# Patient Record
Sex: MALE | Race: WHITE | NOT HISPANIC OR LATINO | Employment: FULL TIME | URBAN - METROPOLITAN AREA
[De-identification: names, ages, dates, MRNs, and addresses within clinical notes are randomized per-mention and may not be internally consistent; named-entity substitution may affect disease eponyms.]

---

## 2018-05-02 ENCOUNTER — HOSPITAL ENCOUNTER (EMERGENCY)
Facility: OTHER | Age: 43
Discharge: HOME | End: 2018-05-02
Attending: EMERGENCY MEDICINE
Payer: MEDICAID

## 2018-05-02 VITALS
SYSTOLIC BLOOD PRESSURE: 125 MMHG | HEIGHT: 72 IN | DIASTOLIC BLOOD PRESSURE: 59 MMHG | TEMPERATURE: 98 F | HEART RATE: 84 BPM | OXYGEN SATURATION: 95 % | RESPIRATION RATE: 13 BRPM

## 2018-05-02 DIAGNOSIS — R41.82 ALTERED MENTAL STATUS: ICD-10-CM

## 2018-05-02 DIAGNOSIS — F10.921 ACUTE ALCOHOLIC INTOXICATION WITH DELIRIUM: Primary | ICD-10-CM

## 2018-05-02 LAB
ALBUMIN SERPL BCP-MCNC: 4.2 G/DL
ALP SERPL-CCNC: 143 U/L
ALT SERPL W/O P-5'-P-CCNC: 123 U/L
ANION GAP SERPL CALC-SCNC: 21 MMOL/L
APAP SERPL-MCNC: <3 UG/ML
AST SERPL-CCNC: 95 U/L
BASOPHILS # BLD AUTO: 0.07 K/UL
BASOPHILS NFR BLD: 0.5 %
BILIRUB SERPL-MCNC: 0.3 MG/DL
BUN SERPL-MCNC: 8 MG/DL
CALCIUM SERPL-MCNC: 9.1 MG/DL
CHLORIDE SERPL-SCNC: 109 MMOL/L
CK SERPL-CCNC: 1028 U/L
CO2 SERPL-SCNC: 14 MMOL/L
CREAT SERPL-MCNC: 1 MG/DL
DIFFERENTIAL METHOD: ABNORMAL
EOSINOPHIL # BLD AUTO: 0.5 K/UL
EOSINOPHIL NFR BLD: 3.7 %
ERYTHROCYTE [DISTWIDTH] IN BLOOD BY AUTOMATED COUNT: 12.3 %
EST. GFR  (AFRICAN AMERICAN): >60 ML/MIN/1.73 M^2
EST. GFR  (NON AFRICAN AMERICAN): >60 ML/MIN/1.73 M^2
ETHANOL SERPL-MCNC: 276 MG/DL
GLUCOSE SERPL-MCNC: 84 MG/DL
HCT VFR BLD AUTO: 45.1 %
HGB BLD-MCNC: 15.3 G/DL
LYMPHOCYTES # BLD AUTO: 5.9 K/UL
LYMPHOCYTES NFR BLD: 43.3 %
MCH RBC QN AUTO: 30.2 PG
MCHC RBC AUTO-ENTMCNC: 33.9 G/DL
MCV RBC AUTO: 89 FL
MONOCYTES # BLD AUTO: 1 K/UL
MONOCYTES NFR BLD: 7.4 %
NEUTROPHILS # BLD AUTO: 6.1 K/UL
NEUTROPHILS NFR BLD: 44.6 %
PLATELET # BLD AUTO: 260 K/UL
PMV BLD AUTO: 10.1 FL
POCT GLUCOSE: 75 MG/DL (ref 70–110)
POTASSIUM SERPL-SCNC: 4.2 MMOL/L
PROT SERPL-MCNC: 8.4 G/DL
RBC # BLD AUTO: 5.07 M/UL
SODIUM SERPL-SCNC: 144 MMOL/L
T4 FREE SERPL-MCNC: 0.98 NG/DL
TSH SERPL DL<=0.005 MIU/L-ACNC: 0.37 UIU/ML
WBC # BLD AUTO: 13.67 K/UL

## 2018-05-02 PROCEDURE — 25000003 PHARM REV CODE 250: Performed by: EMERGENCY MEDICINE

## 2018-05-02 PROCEDURE — 84443 ASSAY THYROID STIM HORMONE: CPT

## 2018-05-02 PROCEDURE — 80053 COMPREHEN METABOLIC PANEL: CPT

## 2018-05-02 PROCEDURE — 80329 ANALGESICS NON-OPIOID 1 OR 2: CPT

## 2018-05-02 PROCEDURE — 96360 HYDRATION IV INFUSION INIT: CPT

## 2018-05-02 PROCEDURE — 85025 COMPLETE CBC W/AUTO DIFF WBC: CPT

## 2018-05-02 PROCEDURE — 80320 DRUG SCREEN QUANTALCOHOLS: CPT

## 2018-05-02 PROCEDURE — 63600175 PHARM REV CODE 636 W HCPCS: Performed by: EMERGENCY MEDICINE

## 2018-05-02 PROCEDURE — 82550 ASSAY OF CK (CPK): CPT

## 2018-05-02 PROCEDURE — 96372 THER/PROPH/DIAG INJ SC/IM: CPT

## 2018-05-02 PROCEDURE — 99285 EMERGENCY DEPT VISIT HI MDM: CPT | Mod: 25

## 2018-05-02 PROCEDURE — 84439 ASSAY OF FREE THYROXINE: CPT

## 2018-05-02 PROCEDURE — 93010 ELECTROCARDIOGRAM REPORT: CPT | Mod: ,,, | Performed by: INTERNAL MEDICINE

## 2018-05-02 PROCEDURE — 96361 HYDRATE IV INFUSION ADD-ON: CPT

## 2018-05-02 RX ORDER — HALOPERIDOL 5 MG/ML
5 INJECTION INTRAMUSCULAR
Status: COMPLETED | OUTPATIENT
Start: 2018-05-02 | End: 2018-05-02

## 2018-05-02 RX ADMIN — LORAZEPAM 1 MG: 2 INJECTION INTRAMUSCULAR; INTRAVENOUS at 10:05

## 2018-05-02 RX ADMIN — SODIUM CHLORIDE 1000 ML: 0.9 INJECTION, SOLUTION INTRAVENOUS at 11:05

## 2018-05-02 RX ADMIN — HALOPERIDOL LACTATE 5 MG: 5 INJECTION, SOLUTION INTRAMUSCULAR at 10:05

## 2018-05-02 NOTE — ED NOTES
Pt awake and at standing at doorway of room. Speech clear. Pt ambulates with steady gait. MD aware, OK to discontinue UA and drug screen orders.
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
## 2018-05-02 NOTE — ED NOTES
Received report from NARESH Bennett at bedside.   Pt is sleeping in stretcher, free of restraints. Pt is connected to cardiac monitoring, NIBP cuff and pulse ox. Pt is in no acute distress. Respirations even and non labored.   Bed is locked and in lowest position, side rails up x 2, will continue to monitor.

## 2018-05-02 NOTE — ED PROVIDER NOTES
"Encounter Date: 5/2/2018       History     Chief Complaint   Patient presents with    Altered Mental Status     passerby called EMS. pt report ETOH, pt in coherant and reffering to security as "[place]" and [place]     Urgent evaluation of 42-year-old gentleman brought in by EMS for concern of intoxication.  Upon arrival to the emergency Department, patient became hyperactive, yelling about "[place]ihads" requiring manual and chemical sedation.          Review of patient's allergies indicates:  No Known Allergies  No past medical history on file.  No past surgical history on file.  No family history on file.  Social History   Substance Use Topics    Smoking status: Not on file    Smokeless tobacco: Not on file    Alcohol use Not on file     Review of Systems   Unable to perform ROS: Acuity of condition       Physical Exam     Initial Vitals [05/02/18 1018]   BP Pulse Resp Temp SpO2   104/66 87 16 96.7 °F (35.9 °C) 99 %      MAP       78.67         Physical Exam    Nursing note and vitals reviewed.  Constitutional: He appears well-developed. He appears listless. He is diaphoretic. He appears distressed.   Agitated, violent   HENT:   Head: Normocephalic and atraumatic.   Horizontal nystagmus   Eyes: Conjunctivae and EOM are normal. Pupils are equal, round, and reactive to light.   Neck: Normal range of motion. Neck supple.   Cardiovascular: Normal heart sounds. Tachycardia present.    Pulmonary/Chest: Effort normal and breath sounds normal.   Abdominal: Soft. Normal appearance and bowel sounds are normal. There is no tenderness.   Musculoskeletal: Normal range of motion.   Neurological: He has normal strength. He appears listless. No cranial nerve deficit or sensory deficit. GCS eye subscore is 4. GCS verbal subscore is 5. GCS motor subscore is 6.   Skin: Skin is warm.   Psychiatric: His mood appears anxious. His affect is angry, labile and inappropriate. His speech is rapid and/or pressured. He is " "agitated, aggressive and hyperactive. He expresses impulsivity.   disoriented He is inattentive.         ED Course   Procedures  Labs Reviewed   CBC W/ AUTO DIFFERENTIAL - Abnormal; Notable for the following:        Result Value    WBC 13.67 (*)     Lymph # 5.9 (*)     All other components within normal limits   COMPREHENSIVE METABOLIC PANEL - Abnormal; Notable for the following:     CO2 14 (*)     Alkaline Phosphatase 143 (*)     AST 95 (*)      (*)     Anion Gap 21 (*)     All other components within normal limits   TSH - Abnormal; Notable for the following:     TSH 0.373 (*)     All other components within normal limits   ALCOHOL,MEDICAL (ETHANOL) - Abnormal; Notable for the following:     Alcohol, Medical, Serum 276 (*)     All other components within normal limits   ACETAMINOPHEN LEVEL - Abnormal; Notable for the following:     Acetaminophen (Tylenol), Serum <3.0 (*)     All other components within normal limits   CK - Abnormal; Notable for the following:     CPK 1028 (*)     All other components within normal limits   T4, FREE   URINALYSIS   DRUG SCREEN PANEL, URINE EMERGENCY   POCT GLUCOSE   POCT GLUCOSE MONITORING CONTINUOUS     EKG Readings: (Independently Interpreted)   Initial Reading: No STEMI.   Normal sinus rhythm, heart rate 95, normal axis, no STEMI          Medical Decision Making:   Initial Assessment:   Urgent evaluation of 42-year-old gentleman with unknown medical history brought in by EMS given concern for intoxication.  On arrival to the emergency Department, patient became very agitated, and attempting to elope, requiring manual restraint. + Poor hygiene, malododorous and dishevled. I attempted to de-escalate the situation, but patient yelling about "the war in [place]", and behaving erratically, requiring chemical sedation.  Once calmed, patient endorses drinking, reports being homeless, endorses prior psychiatric hospitalizations, but currently not suicidal or homicidal, denies hearing " voices. Suspect subtance induced behavior w likely underlying psych issues. Will reassess w metabolization.       Clinical Tests:   Lab Tests: Ordered and Reviewed  Medical Tests: Ordered and Reviewed  ED Management:  Labs obtained, notable for etoh 276, low tsh- nml t4, cpk 1028 and mild elevated ast/alt   Pt treated with IVF, allowed to metabolize to clinical sobriety. Able to ambulate without assistance, a+0x3 prior to dc home with substance abuse education, follow up given.                       Clinical Impression:     1. Acute alcoholic intoxication with delirium    2. Altered mental status        Disposition:   Disposition: Discharged  Condition: Fair                        Edith Driscoll MD  05/02/18 0571

## 2018-05-03 ENCOUNTER — HOSPITAL ENCOUNTER (INPATIENT)
Facility: HOSPITAL | Age: 43
LOS: 5 days | Discharge: HOME | DRG: 897 | End: 2018-05-08
Attending: PSYCHIATRY & NEUROLOGY | Admitting: PSYCHIATRY & NEUROLOGY
Payer: MEDICAID

## 2018-05-03 DIAGNOSIS — F10.930 ALCOHOL WITHDRAWAL SYNDROME WITHOUT COMPLICATION: ICD-10-CM

## 2018-05-03 DIAGNOSIS — F19.94 SUBSTANCE INDUCED MOOD DISORDER: Chronic | ICD-10-CM

## 2018-05-03 DIAGNOSIS — F17.200 TOBACCO USE DISORDER: Chronic | ICD-10-CM

## 2018-05-03 DIAGNOSIS — F10.20 ALCOHOL USE DISORDER, SEVERE, DEPENDENCE: Chronic | ICD-10-CM

## 2018-05-03 DIAGNOSIS — F32.9 MAJOR DEPRESSION: ICD-10-CM

## 2018-05-03 DIAGNOSIS — R74.01 TRANSAMINITIS: ICD-10-CM

## 2018-05-03 DIAGNOSIS — L02.91 ABSCESS: ICD-10-CM

## 2018-05-03 PROBLEM — F33.2 SEVERE EPISODE OF RECURRENT MAJOR DEPRESSIVE DISORDER, WITHOUT PSYCHOTIC FEATURES: Status: ACTIVE | Noted: 2018-05-03

## 2018-05-03 PROBLEM — F10.239 ALCOHOL DEPENDENCE WITH WITHDRAWAL: Status: ACTIVE | Noted: 2018-05-03

## 2018-05-03 PROCEDURE — 12400001 HC PSYCH SEMI-PRIVATE ROOM

## 2018-05-03 PROCEDURE — 25000003 PHARM REV CODE 250: Performed by: NURSE PRACTITIONER

## 2018-05-03 PROCEDURE — S4991 NICOTINE PATCH NONLEGEND: HCPCS | Performed by: STUDENT IN AN ORGANIZED HEALTH CARE EDUCATION/TRAINING PROGRAM

## 2018-05-03 PROCEDURE — 25000003 PHARM REV CODE 250: Performed by: STUDENT IN AN ORGANIZED HEALTH CARE EDUCATION/TRAINING PROGRAM

## 2018-05-03 RX ORDER — THIAMINE HCL 100 MG
100 TABLET ORAL DAILY
Status: DISCONTINUED | OUTPATIENT
Start: 2018-05-04 | End: 2018-05-08 | Stop reason: HOSPADM

## 2018-05-03 RX ORDER — IBUPROFEN 200 MG
1 TABLET ORAL DAILY
Status: DISCONTINUED | OUTPATIENT
Start: 2018-05-03 | End: 2018-05-08 | Stop reason: HOSPADM

## 2018-05-03 RX ORDER — IBUPROFEN 200 MG
1 TABLET ORAL DAILY
Status: DISCONTINUED | OUTPATIENT
Start: 2018-05-04 | End: 2018-05-03

## 2018-05-03 RX ORDER — LORAZEPAM 1 MG/1
2 TABLET ORAL EVERY 4 HOURS PRN
Status: DISCONTINUED | OUTPATIENT
Start: 2018-05-03 | End: 2018-05-08 | Stop reason: HOSPADM

## 2018-05-03 RX ORDER — LOPERAMIDE HYDROCHLORIDE 2 MG/1
2 CAPSULE ORAL
Status: DISCONTINUED | OUTPATIENT
Start: 2018-05-03 | End: 2018-05-08 | Stop reason: HOSPADM

## 2018-05-03 RX ORDER — FOLIC ACID 1 MG/1
1 TABLET ORAL DAILY
Status: DISCONTINUED | OUTPATIENT
Start: 2018-05-04 | End: 2018-05-08 | Stop reason: HOSPADM

## 2018-05-03 RX ADMIN — NICOTINE 1 PATCH: 14 PATCH, EXTENDED RELEASE TRANSDERMAL at 10:05

## 2018-05-03 RX ADMIN — LORAZEPAM 2 MG: 1 TABLET ORAL at 07:05

## 2018-05-03 NOTE — NURSING
Patient arrived to the unit from the [facility].  Accompanied by Jimmy RN.  Patient reports that he is her for severe suicidal ideations.  Reports the suicidal thought come from his drinking.  Patient reports he drinks about a 5th of vodka in a day.  Has been drinking since 16 years of age.  Patient reports he has attempted suicide at least 10 times.  He reports that the longest time he was sober was a year and a half when he was in [facility].  Patient reports he will contract for safety while here.  Patient reports pain in his left ankle.  States he fell and twisted it a couple of days ago.  Here on a PEC.  It was called into the .  In paper scrubs.  Searched with no contraband found.

## 2018-05-03 NOTE — H&P
"[facility]  Psychiatry  History & Physical    Patient Name: Gilmar Clemente  MRN: [id number]   Code Status: No Order  Admission Date: (Not on file)  Attending Physician: Dr. Edmundo Nayak  Primary Care Provider: Primary Doctor No    Current Legal Status: [facility]    Patient information was obtained from patient and ER records.     Subjective:     Principal Problem: Major Depressive Disorder, Severe without psychotic features    Chief Complaint:  Suicidal ideations with plan to jump off bridge or grab gun from a     HPI: Gilmar Clemente is a 42 year old male with no significant PMH who presents with SI and alcohol intoxication which prompted consult to psychiatry.  Pt was lying on ED hospital stretcher with nurse at bedside upon my arrival.  Pt AAOx3.  Speech is clear with normal tone.  Thought processes are organized.  Pt endorses active suicidal ideations with plan to jump off of a bridge or provoke a  by attempting to grab his gun.       Patient reports hx of alcoholism and depression since he was 16 years of age.  Reports drinking a fifth of vodka on an average day.  He admits to drank heavily last night and was picked up off of the street.  Patient denies any homicidal ideations, auditory hallucinations, or visual hallucinations. Patient denies any chest pain, SOB, nausea or vomiting. Serum ETOH was 185 at 0638 and dropped to 114 at 1030.  Urine toxicology negative for other drugs of abuse.  Pt denies current use of street drugs but stated, " I've tried just about everything but alcohol is what I'm addicted to".  Endorses depression symptoms of dysphoria, anhedonia, avolition, social withdrawal, dysregulated appetite and sleep, and feelings of hopelessness.       Psychosocial History:  Currently homeless[place] streets [place].  Pt originally form [place] and moved to [facility] 3 years ago.  Homeless for the past 3 years.  Pt has family history of grandfather " "dying from alcoholism.  Most recent treatment was the [facility] on [facility]. He wants to return to program. Pt reported that he completed only three months of the 6-month program because he thought he was strong enough to be on his own.  State, " it's impossible to whit-knuckle if you're living on the streets".       Psychiatric Medications: currently (none)     Reports past failed trials include but not limited to:  Vivitrol injections, Celexa, Prozac, Wellbutrin         Recommendations:   1) Agree with PEC for protection of self and others for active suicidal thoughts  2) Admit to [facility]U  3) Vital signs q 4 hours while awake  4) Ativan 2mg po q 4 hrs PRN withdrawal signs/symptoms (SBP>160, DBP>100, HR>100, diaphoresis, temulous).          Patient History           Medical as of 5/3/2018     Past Medical History     Diagnosis Date Comments Source    Alcohol abuse -- -- Provider    Depression -- -- Provider    Hx of psychiatric care -- -- Provider    Psychiatric problem -- -- Provider    Suicide attempt -- -- Provider    Therapy -- -- Provider    Withdrawal symptoms, alcohol -- -- Provider                  Surgical as of 5/3/2018    Past Surgical History: Patient provided no pertinent surgical history.           Family as of 5/3/2018     Problem Relation Name Age of Onset Comments Source    No Known Problems Mother -- -- -- Provider    No Known Problems Father -- -- -- Provider    Alcohol abuse Paternal Grandmother -- -- -- Provider            Tobacco Use as of 5/3/2018     Smoking Status Smoking Start Date Smoking Quit Date Packs/day Years Used    Current Every Day Smoker -- -- 1.00 --    Types Comments Smokeless Tobacco Status Smokeless Tobacco Quit Date Source     Cigarettes -- Never Used -- Provider            Alcohol Use as of 5/3/2018     Alcohol Use Drinks/Week Alcohol/Week Comments Source    Yes -- -- drinkl " alot everyday- wine beer whatever I get my hands " "on" Provider            Drug Use as of 5/3/2018     Drug Use Types Frequency Comments Source    Yes  Cocaine -- "In the past, but not recently" Provider            Sexual Activity as of 5/3/2018     Sexually Active Birth Control Partners Comments Source    -- -- -- -- Provider            Activities of Daily Living as of 5/3/2018     Activities of Daily Living Question Response Comments Source    Patient feels they ought to cut down on drinking/drug use Not Asked -- Provider    Patient annoyed by others criticizing their drinking/drug use Not Asked -- Provider    Patient has felt bad or guilty about drinking/drug use Not Asked -- Provider    Patient has had a drink/used drugs as an eye opener in the AM Not Asked -- Provider            Social Documentation as of 5/3/2018    **None**           Occupational as of 5/3/2018    **None**           Socioeconomic as of 5/3/2018     Marital Status Spouse Name Number of Children Years Education Preferred Language Ethnicity Race Source    Single -- -- -- English /White White --         Pertinent History Q A Comments    as of 5/3/2018 Lives with alone     Place in Birth Order      Lives in homeless     Number of Siblings      Raised by      Legal Involvement      Childhood Trauma      Criminal History of      Financial Status unemployed     Highest Level of Education      Does patient have access to a firearm?       Service      Primary Leisure Activity      Spirituality       Past Medical History:   Diagnosis Date    Alcohol abuse     Depression     Hx of psychiatric care     Psychiatric problem     Suicide attempt     Therapy     Withdrawal symptoms, alcohol      No past surgical history on file.  Family History     Problem Relation (Age of Onset)    Alcohol abuse Paternal Grandmother    No Known Problems Mother, Father        Social History Main Topics    Smoking status: Current Every Day Smoker     Packs/day: 1.00     Types: Cigarettes    Smokeless " "tobacco: Never Used    Alcohol use Yes      Comment: drinkl " alot everyday- wine beer whatever I get my hands on"    Drug use: Yes     Types: Cocaine      Comment: "In the past, but not recently"    Sexual activity: Not on file     Review of patient's allergies indicates:  No Known Allergies    Current Facility-Administered Medications on File Prior to Encounter   Medication    [COMPLETED] diazePAM tablet 5 mg     Current Outpatient Prescriptions on File Prior to Encounter   Medication Sig    GABAPENTIN, BULK, MISC by Misc.(Non-Drug; Combo Route) route.     Psychotherapeutics     None        Review of Systems   Constitutional: Negative.    HENT: Negative.    Eyes: Negative.    Respiratory: Negative.    Cardiovascular: Negative.    Endocrine: Negative.    Genitourinary: Negative.    Musculoskeletal: Negative.    Allergic/Immunologic: Negative.    Neurological: Negative.    Hematological: Negative.    Psychiatric/Behavioral: Positive for dysphoric mood and suicidal ideas. The patient is nervous/anxious.      Strengths and Liabilities: Strength: Patient accepts guidance/feedback, Strength: Patient is motivated for change., Liability: Patient is dependent., Liability: Patient lacks social skills., Liability: Patient has no suport network., Liability: Patient has poor health., Liability: Patient has poor judgment, Liability: Patient is unstable., Liability: Patient lacks coping skills.    Objective:     Vital Signs (Most Recent):    Vital Signs (24h Range):  Temp:  [97.6 °F (36.4 °C)-98.2 °F (36.8 °C)] 97.9 °F (36.6 °C)  Pulse:  [] 95  Resp:  [16-20] 18  SpO2:  [98 %-100 %] 99 %  BP: (116-162)/(72-97) 162/97           There is no height or weight on file to calculate BMI.      Intake/Output Summary (Last 24 hours) at 05/03/18 1425  Last data filed at 05/03/18 0800   Gross per 24 hour   Intake                0 ml   Output              600 ml   Net             -600 ml       Physical Exam   Constitutional: He is " oriented to person, place, and time. He appears well-developed.   Eyes: Pupils are equal, round, and reactive to light.   Neck: Normal range of motion.   Cardiovascular: Normal rate.    Pulmonary/Chest: Effort normal.   Abdominal: Soft.   Musculoskeletal: Normal range of motion.   Neurological: He is oriented to person, place, and time.   Skin: Skin is warm and dry.   Psychiatric: His speech is normal and behavior is normal. Thought content normal.     NEUROLOGICAL EXAMINATION:     MENTAL STATUS   Oriented to person, place, and time.   Speech: speech is normal   Level of consciousness: alert    CRANIAL NERVES     CN III, IV, VI   Pupils are equal, round, and reactive to light.    Significant Labs: All pertinent labs within the past 24 hours have been reviewed.    Significant Imaging: None    Assessment/Plan:     No notes have been filed under this hospital service.  Service: Psychiatry     Estimated Discharge Date:  5 - 7 days  Initial Discharge Plan: Other: Residential Alcohol/Drug Rehab    Prognosis: Guarded    Need for Continued Hospitalization:   Psychiatric illness continues to pose a potential threat to life or bodily function, of self or others, thereby requiring the need for continued inpatient psychiatric hospitalization.    Total Time: 50 with greater than 50% of time spent in counseling and/or coordination of care.     Curry Gold III, NP   Psychiatry  [facility]

## 2018-05-03 NOTE — NURSING
Patient sitting and watching TV does not appear to be in distress.  Called Dr. Cueva to get him a nicotine patch for tonight.  She states patient only need to meet one perimeter to get prn Ativan tonight.

## 2018-05-03 NOTE — SUBJECTIVE & OBJECTIVE
"     Patient History           Medical as of 5/3/2018     Past Medical History     Diagnosis Date Comments Source    Alcohol abuse -- -- Provider    Depression -- -- Provider    Hx of psychiatric care -- -- Provider    Psychiatric problem -- -- Provider    Suicide attempt -- -- Provider    Therapy -- -- Provider    Withdrawal symptoms, alcohol -- -- Provider                  Surgical as of 5/3/2018    Past Surgical History: Patient provided no pertinent surgical history.           Family as of 5/3/2018     Problem Relation Name Age of Onset Comments Source    No Known Problems Mother -- -- -- Provider    No Known Problems Father -- -- -- Provider    Alcohol abuse Paternal Grandmother -- -- -- Provider            Tobacco Use as of 5/3/2018     Smoking Status Smoking Start Date Smoking Quit Date Packs/day Years Used    Current Every Day Smoker -- -- 1.00 --    Types Comments Smokeless Tobacco Status Smokeless Tobacco Quit Date Source     Cigarettes -- Never Used -- Provider            Alcohol Use as of 5/3/2018     Alcohol Use Drinks/Week Alcohol/Week Comments Source    Yes -- -- drinkl " alot everyday- wine beer whatever I get my hands on" Provider            Drug Use as of 5/3/2018     Drug Use Types Frequency Comments Source    Yes  Cocaine -- "In the past, but not recently" Provider            Sexual Activity as of 5/3/2018     Sexually Active Birth Control Partners Comments Source    -- -- -- -- Provider            Activities of Daily Living as of 5/3/2018     Activities of Daily Living Question Response Comments Source    Patient feels they ought to cut down on drinking/drug use Not Asked -- Provider    Patient annoyed by others criticizing their drinking/drug use Not Asked -- Provider    Patient has felt bad or guilty about drinking/drug use Not Asked -- Provider    Patient has had a drink/used drugs as an eye opener in the AM Not Asked -- Provider            Social Documentation as of 5/3/2018    **None**      " "     Occupational as of 5/3/2018    **None**           Socioeconomic as of 5/3/2018     Marital Status Spouse Name Number of Children Years Education Preferred Language Ethnicity Race Source    Single -- -- -- English /White White --         Pertinent History Q A Comments    as of 5/3/2018 Lives with alone     Place in Birth Order      Lives in homeless     Number of Siblings      Raised by      Legal Involvement      Childhood Trauma      Criminal History of      Financial Status unemployed     Highest Level of Education      Does patient have access to a firearm?       Service      Primary Leisure Activity      Spirituality       Past Medical History:   Diagnosis Date    Alcohol abuse     Depression     Hx of psychiatric care     Psychiatric problem     Suicide attempt     Therapy     Withdrawal symptoms, alcohol      No past surgical history on file.  Family History     Problem Relation (Age of Onset)    Alcohol abuse Paternal Grandmother    No Known Problems Mother, Father        Social History Main Topics    Smoking status: Current Every Day Smoker     Packs/day: 1.00     Types: Cigarettes    Smokeless tobacco: Never Used    Alcohol use Yes      Comment: drinkl " alot everyday- wine beer whatever I get my hands on"    Drug use: Yes     Types: Cocaine      Comment: "In the past, but not recently"    Sexual activity: Not on file     Review of patient's allergies indicates:  No Known Allergies    Current Facility-Administered Medications on File Prior to Encounter   Medication    [COMPLETED] diazePAM tablet 5 mg     Current Outpatient Prescriptions on File Prior to Encounter   Medication Sig    GABAPENTIN, BULK, MISC by Misc.(Non-Drug; Combo Route) route.     Psychotherapeutics     None        Review of Systems   Constitutional: Negative.    HENT: Negative.    Eyes: Negative.    Respiratory: Negative.    Cardiovascular: Negative.    Endocrine: Negative.    Genitourinary: Negative.  "   Musculoskeletal: Negative.    Allergic/Immunologic: Negative.    Neurological: Negative.    Hematological: Negative.    Psychiatric/Behavioral: Positive for dysphoric mood and suicidal ideas. The patient is nervous/anxious.      Strengths and Liabilities: Strength: Patient accepts guidance/feedback, Strength: Patient is motivated for change., Liability: Patient is dependent., Liability: Patient lacks social skills., Liability: Patient has no suport network., Liability: Patient has poor health., Liability: Patient has poor judgment, Liability: Patient is unstable., Liability: Patient lacks coping skills.    Objective:     Vital Signs (Most Recent):    Vital Signs (24h Range):  Temp:  [97.6 °F (36.4 °C)-98.2 °F (36.8 °C)] 97.9 °F (36.6 °C)  Pulse:  [] 95  Resp:  [16-20] 18  SpO2:  [98 %-100 %] 99 %  BP: (116-162)/(72-97) 162/97           There is no height or weight on file to calculate BMI.      Intake/Output Summary (Last 24 hours) at 05/03/18 1425  Last data filed at 05/03/18 0800   Gross per 24 hour   Intake                0 ml   Output              600 ml   Net             -600 ml       Physical Exam   Constitutional: He is oriented to person, place, and time. He appears well-developed.   Eyes: Pupils are equal, round, and reactive to light.   Neck: Normal range of motion.   Cardiovascular: Normal rate.    Pulmonary/Chest: Effort normal.   Abdominal: Soft.   Musculoskeletal: Normal range of motion.   Neurological: He is oriented to person, place, and time.   Skin: Skin is warm and dry.   Psychiatric: His speech is normal and behavior is normal. Thought content normal.     NEUROLOGICAL EXAMINATION:     MENTAL STATUS   Oriented to person, place, and time.   Speech: speech is normal   Level of consciousness: alert    CRANIAL NERVES     CN III, IV, VI   Pupils are equal, round, and reactive to light.    Significant Labs: All pertinent labs within the past 24 hours have been reviewed.    Significant Imaging:  None
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
## 2018-05-04 PROBLEM — F19.94 SUBSTANCE INDUCED MOOD DISORDER: Chronic | Status: ACTIVE | Noted: 2018-05-03

## 2018-05-04 PROBLEM — F33.2 SEVERE EPISODE OF RECURRENT MAJOR DEPRESSIVE DISORDER, WITHOUT PSYCHOTIC FEATURES: Status: RESOLVED | Noted: 2018-05-03 | Resolved: 2018-05-04

## 2018-05-04 PROBLEM — F17.200 TOBACCO USE DISORDER: Chronic | Status: ACTIVE | Noted: 2018-05-04

## 2018-05-04 PROBLEM — F10.20 ALCOHOL USE DISORDER, SEVERE, DEPENDENCE: Chronic | Status: ACTIVE | Noted: 2018-05-03

## 2018-05-04 PROBLEM — F10.930 ALCOHOL WITHDRAWAL SYNDROME WITHOUT COMPLICATION: Status: ACTIVE | Noted: 2018-05-04

## 2018-05-04 LAB
CHOLEST SERPL-MCNC: 180 MG/DL
CHOLEST/HDLC SERPL: 4.7 {RATIO}
HDLC SERPL-MCNC: 38 MG/DL
HDLC SERPL: 21.1 %
LDLC SERPL CALC-MCNC: 120.6 MG/DL
NONHDLC SERPL-MCNC: 142 MG/DL
TRIGL SERPL-MCNC: 107 MG/DL

## 2018-05-04 PROCEDURE — S4991 NICOTINE PATCH NONLEGEND: HCPCS | Performed by: STUDENT IN AN ORGANIZED HEALTH CARE EDUCATION/TRAINING PROGRAM

## 2018-05-04 PROCEDURE — 25000003 PHARM REV CODE 250: Performed by: NURSE PRACTITIONER

## 2018-05-04 PROCEDURE — 25000003 PHARM REV CODE 250: Performed by: STUDENT IN AN ORGANIZED HEALTH CARE EDUCATION/TRAINING PROGRAM

## 2018-05-04 PROCEDURE — 25000003 PHARM REV CODE 250: Performed by: PSYCHIATRY & NEUROLOGY

## 2018-05-04 PROCEDURE — 99223 1ST HOSP IP/OBS HIGH 75: CPT | Mod: AF,HB,, | Performed by: PSYCHIATRY & NEUROLOGY

## 2018-05-04 PROCEDURE — 36415 COLL VENOUS BLD VENIPUNCTURE: CPT

## 2018-05-04 PROCEDURE — 12400001 HC PSYCH SEMI-PRIVATE ROOM

## 2018-05-04 PROCEDURE — 90853 GROUP PSYCHOTHERAPY: CPT | Mod: HP,HB,, | Performed by: PSYCHOLOGIST

## 2018-05-04 PROCEDURE — 80061 LIPID PANEL: CPT

## 2018-05-04 RX ORDER — DIAZEPAM 5 MG/1
10 TABLET ORAL 3 TIMES DAILY
Status: DISCONTINUED | OUTPATIENT
Start: 2018-05-04 | End: 2018-05-04

## 2018-05-04 RX ORDER — DIAZEPAM 2 MG/1
2 TABLET ORAL 2 TIMES DAILY
Status: DISCONTINUED | OUTPATIENT
Start: 2018-05-08 | End: 2018-05-08 | Stop reason: HOSPADM

## 2018-05-04 RX ORDER — DICYCLOMINE HYDROCHLORIDE 20 MG/1
20 TABLET ORAL 3 TIMES DAILY PRN
Status: DISCONTINUED | OUTPATIENT
Start: 2018-05-04 | End: 2018-05-08 | Stop reason: HOSPADM

## 2018-05-04 RX ORDER — DIAZEPAM 5 MG/1
5 TABLET ORAL 2 TIMES DAILY
Status: COMPLETED | OUTPATIENT
Start: 2018-05-07 | End: 2018-05-07

## 2018-05-04 RX ORDER — DIPHENHYDRAMINE HCL 50 MG
50 CAPSULE ORAL EVERY 6 HOURS PRN
Status: DISCONTINUED | OUTPATIENT
Start: 2018-05-04 | End: 2018-05-08 | Stop reason: HOSPADM

## 2018-05-04 RX ORDER — HYDROXYZINE PAMOATE 25 MG/1
25 CAPSULE ORAL EVERY 6 HOURS PRN
Status: DISCONTINUED | OUTPATIENT
Start: 2018-05-04 | End: 2018-05-08 | Stop reason: HOSPADM

## 2018-05-04 RX ORDER — ONDANSETRON 4 MG/1
4 TABLET, FILM COATED ORAL EVERY 6 HOURS PRN
Status: DISCONTINUED | OUTPATIENT
Start: 2018-05-04 | End: 2018-05-08 | Stop reason: HOSPADM

## 2018-05-04 RX ORDER — IBUPROFEN 400 MG/1
400 TABLET ORAL EVERY 6 HOURS PRN
Status: DISCONTINUED | OUTPATIENT
Start: 2018-05-04 | End: 2018-05-08 | Stop reason: HOSPADM

## 2018-05-04 RX ORDER — DIAZEPAM 5 MG/1
10 TABLET ORAL 3 TIMES DAILY
Status: COMPLETED | OUTPATIENT
Start: 2018-05-04 | End: 2018-05-04

## 2018-05-04 RX ORDER — DIAZEPAM 5 MG/1
10 TABLET ORAL 2 TIMES DAILY
Status: COMPLETED | OUTPATIENT
Start: 2018-05-05 | End: 2018-05-05

## 2018-05-04 RX ADMIN — HYDROXYZINE PAMOATE 25 MG: 25 CAPSULE ORAL at 11:05

## 2018-05-04 RX ADMIN — DIAZEPAM 10 MG: 5 TABLET ORAL at 03:05

## 2018-05-04 RX ADMIN — LORAZEPAM 2 MG: 1 TABLET ORAL at 03:05

## 2018-05-04 RX ADMIN — DIAZEPAM 10 MG: 5 TABLET ORAL at 11:05

## 2018-05-04 RX ADMIN — HYDROXYZINE PAMOATE 25 MG: 25 CAPSULE ORAL at 09:05

## 2018-05-04 RX ADMIN — NICOTINE 1 PATCH: 14 PATCH, EXTENDED RELEASE TRANSDERMAL at 08:05

## 2018-05-04 RX ADMIN — IBUPROFEN 400 MG: 400 TABLET, FILM COATED ORAL at 11:05

## 2018-05-04 RX ADMIN — THERA TABS 1 TABLET: TAB at 08:05

## 2018-05-04 RX ADMIN — DIAZEPAM 10 MG: 5 TABLET ORAL at 08:05

## 2018-05-04 RX ADMIN — Medication 100 MG: at 08:05

## 2018-05-04 RX ADMIN — FOLIC ACID 1 MG: 1 TABLET ORAL at 08:05

## 2018-05-04 NOTE — PROGRESS NOTES
05/04/18 0900 05/04/18 1000 05/04/18 1100   [facility] Group Therapy   Group Name Community Reintegration Mental Awareness Education   Specific Interventions Current Events Cognitive Stimulation Training Relapse Prevention   Participation Level Active;Appropriate Active;Appropriate Active;Attentive;Appropriate   Participation Quality Cooperative;Social Cooperative Cooperative;Social   Insight/Motivation Good Good Good   Affect/Mood Display Appropriate Appropriate Appropriate   Cognition Alert;Oriented Alert;Oriented Alert       05/04/18 1300   [facility] Group Therapy   Group Name Therapeutic Recreation   Specific Interventions (movie social)   Participation Level Active;Appropriate;Attentive   Participation Quality Cooperative;Social   Insight/Motivation Good   Affect/Mood Display Appropriate   Cognition Alert;Oriented

## 2018-05-04 NOTE — PROGRESS NOTES
Group Psychotherapy (PhD/LCSW)    Site: [facility]    Clinical status of patient: Inpatient    Date: 5/4/2018    Group Focus: Life Skills    Length of service: 41983 - 35-40 minutes    Number of patients in attendance: 6    Referred by: Acute Psychiatry Unit Treatment Team    Target symptoms: Alcohol Abuse and Mood Disorder    Patient's response to treatment: Active Listening and Self-disclosure    Progress toward goals: Progressing slowly    Interval History: Pt appeared alert and attentive in group. Pt responded actively and appropriately when prompted in a group discussion of strategies for depersonalizing the attitudes of others that one considers unfair, insensitive, rude, etc. Noted that it can be valuable to take criticism from others seriously without taking it personally.      Diagnosis: SIMD; alcohol dependence     Plan: Continue treatment on [facility]

## 2018-05-04 NOTE — PROGRESS NOTES
Pt resting in dayroom. He c/o Lt ankle pain. Pt instructed to rest with it elevated and to let the treatment team know about it in the morning. Pt agrees with plan to wait for when he sees treatment team. MVC in place will continue to monitor.

## 2018-05-04 NOTE — PLAN OF CARE
Problem: Patient Care Overview (Adult)  Goal: Plan of Care Review  Outcome: Ongoing (interventions implemented as appropriate)  Patient reports slept fair last night.  Out of room today watching TV and attending some groups.  Reports comfort medications and valium taper are helping with detox symptoms.  Patient is unkempt Has small cuts, scratches and bites on extremities from living outside.  Patient had an x-ray of the left ankle.  Eating well.  No nausea and vomiting at this time.     Problem: Overarching Goals (Adult)  Goal: Adheres to Safety Considerations for Self and Others  Outcome: Ongoing (interventions implemented as appropriate)  Adheres to safety rules and routines.    Goal: Optimized Coping Skills in Response to Life Stressors  Outcome: Ongoing (interventions implemented as appropriate)  Patient reports understanding of learning new copings skills for discharge.   Goal: Develops/Participates in Therapeutic Staley to Support Successful Transition  Outcome: Ongoing (interventions implemented as appropriate)  Participates in the therapeutic alliance    Problem: Mood Impairment (Depressive Signs/Symptoms) (Adult)  Goal: Improved Mood Symptoms  Outcome: Ongoing (interventions implemented as appropriate)  Patient reports his mood is still depressed and anxious due to detoxing.     Problem: Feelings of Worthlessness, Hopelessness, Excessive Guilt (Depressive Signs/Symptoms) (Adult)  Goal: Enhanced Self-Esteem/Confidence  Outcome: Ongoing (interventions implemented as appropriate)  No change in self esteem or self worth    Problem: Suicidal Behavior (Adult)  Goal: Suicidal Behavior is Absent/Minimized/Managed  Outcome: Ongoing (interventions implemented as appropriate)  Patient reports he feels safe here and does not have any suicidal ideations.

## 2018-05-04 NOTE — PROGRESS NOTES
BEN spoke with Kang at [facility]. Per report pt left program April 20 without graduating. Patient will have to wait 30 days in order to be able to return to program. Patient knows the rule as he mentioned it during his interview in treatment rounds this am.    BEN spoke with pt and provided [facility].

## 2018-05-04 NOTE — PLAN OF CARE
Problem: Patient Care Overview (Adult)  Goal: Plan of Care Review  Outcome: Ongoing (interventions implemented as appropriate)  POC discussed with pt, calm and cooperative on the unit. Follows direction and attends group with active participation. Med compliant, good hygiene,and good appetite. Denies SI/HI/AVH, bright affect, thoughts are future oriented. Mood is fair. Out visible on the unit. Safety plan reviewed and environmental rounds done. Reviewed medicine with pt will require further instruction. Pt given time to ask questions, all questions answered. MVC in place will continue to monitor.     Problem: Mood Impairment (Depressive Signs/Symptoms) (Adult)  Goal: Improved Mood Symptoms  Outcome: Ongoing (interventions implemented as appropriate)  Pt is irritarble but redirectable and has not acted out this evening.     Problem: Feelings of Worthlessness, Hopelessness, Excessive Guilt (Depressive Signs/Symptoms) (Adult)  Goal: Enhanced Self-Esteem/Confidence  Outcome: Ongoing (interventions implemented as appropriate)  Pt is attending groups and future oriented looking forward to a long term rehab.     Problem: Suicidal Behavior (Adult)  Goal: Suicidal Behavior is Absent/Minimized/Managed  Outcome: Ongoing (interventions implemented as appropriate)  Pt denies active SI's at this time and contracts for safety on the unit.

## 2018-05-04 NOTE — PROGRESS NOTES
05/03/18 2000   [facility] Group Therapy   Group Name Community Reintegration   Specific Interventions Coping Skills Training   Participation Level Appropriate   Participation Quality Cooperative   Insight/Motivation Good   Affect/Mood Display Appropriate   Cognition Alert;Oriented

## 2018-05-04 NOTE — PROGRESS NOTES
Pt slept 5.5 hours, he is up anxious and tremulous. , /89. Pt given prn Ativan po. Sitting in the group room reading.

## 2018-05-05 PROCEDURE — 25000003 PHARM REV CODE 250: Performed by: STUDENT IN AN ORGANIZED HEALTH CARE EDUCATION/TRAINING PROGRAM

## 2018-05-05 PROCEDURE — 25000003 PHARM REV CODE 250: Performed by: PSYCHIATRY & NEUROLOGY

## 2018-05-05 PROCEDURE — 12400001 HC PSYCH SEMI-PRIVATE ROOM

## 2018-05-05 PROCEDURE — 99232 SBSQ HOSP IP/OBS MODERATE 35: CPT | Mod: AF,HB,, | Performed by: PSYCHIATRY & NEUROLOGY

## 2018-05-05 PROCEDURE — 25000003 PHARM REV CODE 250: Performed by: NURSE PRACTITIONER

## 2018-05-05 PROCEDURE — S4991 NICOTINE PATCH NONLEGEND: HCPCS | Performed by: STUDENT IN AN ORGANIZED HEALTH CARE EDUCATION/TRAINING PROGRAM

## 2018-05-05 RX ORDER — SULFAMETHOXAZOLE AND TRIMETHOPRIM 800; 160 MG/1; MG/1
1 TABLET ORAL 2 TIMES DAILY
Status: DISCONTINUED | OUTPATIENT
Start: 2018-05-05 | End: 2018-05-08 | Stop reason: HOSPADM

## 2018-05-05 RX ADMIN — NICOTINE 1 PATCH: 14 PATCH, EXTENDED RELEASE TRANSDERMAL at 09:05

## 2018-05-05 RX ADMIN — THERA TABS 1 TABLET: TAB at 09:05

## 2018-05-05 RX ADMIN — IBUPROFEN 400 MG: 400 TABLET, FILM COATED ORAL at 06:05

## 2018-05-05 RX ADMIN — DIAZEPAM 10 MG: 5 TABLET ORAL at 09:05

## 2018-05-05 RX ADMIN — DIPHENHYDRAMINE HYDROCHLORIDE 50 MG: 50 CAPSULE ORAL at 03:05

## 2018-05-05 RX ADMIN — HYDROXYZINE PAMOATE 25 MG: 25 CAPSULE ORAL at 09:05

## 2018-05-05 RX ADMIN — Medication 100 MG: at 09:05

## 2018-05-05 RX ADMIN — DIAZEPAM 10 MG: 5 TABLET ORAL at 08:05

## 2018-05-05 RX ADMIN — SULFAMETHOXAZOLE AND TRIMETHOPRIM 1 TABLET: 800; 160 TABLET ORAL at 08:05

## 2018-05-05 RX ADMIN — FOLIC ACID 1 MG: 1 TABLET ORAL at 09:05

## 2018-05-05 RX ADMIN — IBUPROFEN 400 MG: 400 TABLET, FILM COATED ORAL at 03:05

## 2018-05-05 RX ADMIN — SULFAMETHOXAZOLE AND TRIMETHOPRIM 1 TABLET: 800; 160 TABLET ORAL at 02:05

## 2018-05-05 NOTE — PLAN OF CARE
Problem: Patient Care Overview  Goal: Plan of Care Review  Outcome: Ongoing (interventions implemented as appropriate)  Observed awake and alert. Affect flat, mood depressed. Denied SI/HI, A/V hallucinations. Rates depression 7/10. Attended group activities, took scheduled medications without any problems and requested prn medications. Appeared asleep throughout the night as noted during frequent rounds. Free from falls/injury. Safety maintained. Continue to monitor.

## 2018-05-05 NOTE — PROGRESS NOTES
"[facility]  Psychiatry  Progress Note    Patient Name: Gilmar Clemente  MRN: [id number]   Code Status: Full Code  Admission Date: 5/3/2018  Hospital Length of Stay: 2 days  Expected Discharge Date:   Attending Physician: Edmundo Nayak MD  Primary Care Provider: Primary Doctor No      Subjective:     Principal Problem:Substance induced mood disorder    HPI: Gilmar Clemente is a 42 year old male with no significant PMH who presents with SI and alcohol intoxication which prompted consult to psychiatry.  Pt was lying on ED hospital stretcher with nurse at bedside upon my arrival.  Pt AAOx3.  Speech is clear with normal tone.  Thought processes are organized.  Pt endorses active suicidal ideations with plan to jump off of a bridge or provoke a  by attempting to grab his gun.       Patient reports hx of alcoholism and depression since he was 16 years of age.  Reports drinking a fifth of vodka on an average day.  He admits to drank heavily last night and was picked up off of the street.  Patient denies any homicidal ideations, auditory hallucinations, or visual hallucinations. Patient denies any chest pain, SOB, nausea or vomiting. Serum ETOH was 185 at 0638 and dropped to 114 at 1030.  Urine toxicology negative for other drugs of abuse.  Pt denies current use of street drugs but stated, " I've tried just about everything but alcohol is what I'm addicted to".  Endorses depression symptoms of dysphoria, anhedonia, avolition, social withdrawal, dysregulated appetite and sleep, and feelings of hopelessness.       Psychosocial History:  Currently homeless; sleeps on the streets of the [facility].  Pt originally form [place] and moved to [facility] 3 years ago.  Homeless for the past 3 years.  Pt has family history of grandfather dying from alcoholism.  Most [facility] on [facility]. He wants to return to program. Pt reported " "that he completed only three months of the 6-month program because he thought he was strong enough to be on his own.  State, " it's impossible to whit-knuckle if you're living on the streets".       Psychiatric Medications: currently (none)     Reports past failed trials include but not limited to:  Vivitrol injections, Celexa, Prozac, Wellbutrin         Recommendations:   1) Agree with PEC for protection of self and others for active suicidal thoughts  2) Admit to [facility]  3) Vital signs q 4 hours while awake  4) Ativan 2mg po q 4 hrs PRN withdrawal signs/symptoms (SBP>160, DBP>100, HR>100, diaphoresis, temulous).     Hospital Course: No notes on file    Interval History: visible on the barone.  Has left forearm abscess. General surgery consult has been notified.    Family History     Problem Relation (Age of Onset)    Alcohol abuse Paternal Grandmother    No Known Problems Mother, Father        Social History Main Topics    Smoking status: Current Every Day Smoker     Packs/day: 1.00     Years: 30.00     Types: Cigarettes    Smokeless tobacco: Never Used    Alcohol use Yes      Comment: drinkl " alot everyday- wine beer whatever I get my hands on"    Drug use: Yes     Types: Cocaine, Marijuana      Comment: "In the past, but not recently"    Sexual activity: Yes     Partners: Female     Psychotherapeutics     Start     Stop Route Frequency Ordered    05/08/18 0900  diazePAM tablet 2 mg      05/09 0859 Oral 2 times daily 05/04/18 1038    05/07/18 0900  diazePAM tablet 5 mg      05/08 0859 Oral 2 times daily 05/04/18 1038    05/06/18 0900  diazePAM tablet 7 mg      05/07 0859 Oral 2 times daily 05/04/18 1038    05/05/18 0900  diazePAM tablet 10 mg      05/06 0859 Oral 2 times daily 05/04/18 1038    05/03/18 1619  LORazepam tablet 2 mg      -- Oral Every 4 hours PRN 05/03/18 1619           Review of Systems   Constitutional: Negative.      Objective:     Vital Signs (Most Recent):  Temp: 98 °F (36.7 °C) (05/05/18 " "0337)  Pulse: 91 (05/05/18 0337)  Resp: 18 (05/05/18 0337)  BP: (!) 131/94 (05/05/18 0337) Vital Signs (24h Range):  Temp:  [98 °F (36.7 °C)-98.1 °F (36.7 °C)] 98 °F (36.7 °C)  Pulse:  [80-94] 91  Resp:  [18] 18  BP: (131-155)/(80-96) 131/94     Height: 5' 11" (180.3 cm)  Weight: 79.9 kg (176 lb 2.4 oz)  Body mass index is 24.57 kg/m².    No intake or output data in the 24 hours ending 05/05/18 0906    Physical Exam   Constitutional: He appears well-nourished.   Mental Status Exam:  Appearance: unremarkable, age appropriate  Grooming: appropriate to situation  Arousal: alert, awake  Behavior/Cooperation: normal, cooperative  Speech: normal tone, normal rate, normal pitch, normal volume  Language: appropriate english vocabulary  Mood: neutral  Affect: constricted  Thought Process: normal and logical  Thought Content: no SI/HI  Associations: no loose associations noted  Orientation: grossly intact  Memory: Grossly intact  Cognition: grossly intact  Insight: limited  Judgment: limited       Significant Labs:   Last 24 Hours:   Recent Lab Results     None            Assessment/Plan:     * Substance induced mood disorder    Denies SI. Calm.         Plan: continue current care.    Need for Continued Hospitalization:   Psychiatric illness continues to pose a potential threat to life or bodily function, of self or others, thereby requiring the need for continued inpatient psychiatric hospitalization.    Anticipated Disposition: as per team    Total time:  15 with greater than 50% of this time spent in counseling and/or coordination of care.       Tahir Biswas MD   Psychiatry  [facility]  "

## 2018-05-05 NOTE — PLAN OF CARE
Notified that pt had developed pain, swelling, redness on posterior LUE just distal to elbow.    On interview, pt stated that he had multiple ant bites and initially he had what seemed like a regular ant bite at the site, but that in the middle of the night he woke up with pain and noticed that the area had turned red, painful, tender, firm, warm, denied drainage. Stated that nothing like this had ever happened before to him.    On exam:  Vitals:    05/05/18 0337   BP: (!) 131/94   Pulse: 91   Resp: 18   Temp: 98 °F (36.7 °C)     General: nontoxic  Derm: LUE posterior forearm just distal to elbow with 1.5x1.5 area of swelling, firm, erythema, tender to palpation, warm, with no noted drainage, with central area of whitish-yellow discoloration pinhead sized, concern for area of fluctuance under the firm exterior. Normal sensation in surrounding unaffected areas, normal distal pulses.    A/P: pt with cellulitis. Unclear whether abscess.   Paged surgery. Will hand off to oncoming team.

## 2018-05-05 NOTE — NURSING
Pt. awake with complaints of pain to left elbow. Area reddened, swollen and inflamed. Gave Ibuprofen for complaint of pain. Dr. Alcocer notified.

## 2018-05-05 NOTE — CONSULTS
"GENERAL SURGERY  Consultation      REASON FOR CONSULT:  abscess     SUBJECTIVE:     HISTORY OF PRESENT ILLNESS:  Gilmar Clemente is a 42 y.o. male who was initially admitted to [facility] on 5/3/2018 for Substance induced mood disorder      The patient has no significant PMH and presented with SI and alcohol intoxication which prompted consult to psychiatry.  Pt was lying on ED hospital stretcher with nurse at bedside upon my arrival.  Pt AAOx3.  Speech is clear with normal tone.  Thought processes are organized.  Pt endorses active suicidal ideations with plan to jump off of a bridge or provoke a  by attempting to grab his gun.       Patient reports hx of alcoholism and depression since he was 16 years of age.  Reports drinking a fifth of vodka on an average day.  He admits to drank heavily last night and was picked up off of the street.  Patient denies any homicidal ideations, auditory hallucinations, or visual hallucinations. Patient denies any chest pain, SOB, nausea or vomiting. Serum ETOH was 185 at 0638 and dropped to 114 at 1030.  Urine toxicology negative for other drugs of abuse.  Pt denies current use of street drugs but stated, " I've tried just about everything but alcohol is what I'm addicted to".  Endorses depression symptoms of dysphoria, anhedonia, avolition, social withdrawal, dysregulated appetite and sleep, and feelings of hopelessness.       Psychosocial History:  Currently homeless; sleeps on the streets of the [facility].  Pt originally form [place] and moved to [facility] 3 years ago.  Homeless for the past 3 years.  Pt has family history of grandfather dying from alcoholism.  Most [facility] on [facility]. He wants to return to program. Pt reported that he completed only three months of the 6-month program because he thought he was strong enough to be on his own.  State, " it's impossible to " "whit-knuckle if you're living on the streets".      General Surgery has been consulted for small abscess which developed on left posterior forearm beneath elbow.        MEDICATIONS:  Home Medications:  No current facility-administered medications on file prior to encounter.      No current outpatient prescriptions on file prior to encounter.     Inpatient Medications:   diazePAM  10 mg Oral BID    [START ON 5/8/2018] diazePAM  2 mg Oral BID    [START ON 5/7/2018] diazePAM  5 mg Oral BID    [START ON 5/6/2018] diazePAM  7 mg Oral BID    folic acid  1 mg Oral Daily    multivitamin  1 tablet Oral Daily    nicotine  1 patch Transdermal Daily    thiamine  100 mg Oral Daily     Infusions:  PRN Medications:dicyclomine, diphenhydrAMINE, hydrOXYzine pamoate, ibuprofen, loperamide, LORazepam, ondansetron    ALLERGIES:    Review of patient's allergies indicates:  No Known Allergies    PAST MEDICAL HISTORY:    Past Medical History:   Diagnosis Date    Alcohol abuse     Depression     History of psychiatric hospitalization     Hx of psychiatric care     Psychiatric exam requested by authority     Psychiatric problem     Suicide attempt     Therapy     Withdrawal symptoms, alcohol        SURGICAL HISTORY:  No past surgical history on file.    FAMILY HISTORY:  Family History   Problem Relation Age of Onset    No Known Problems Mother     No Known Problems Father     Alcohol abuse Paternal Grandmother        SOCIAL HISTORY:  Social History   Substance Use Topics    Smoking status: Current Every Day Smoker     Packs/day: 1.00     Years: 30.00     Types: Cigarettes    Smokeless tobacco: Never Used    Alcohol use Yes      Comment: drinkl " alot everyday- wine beer whatever I get my hands on"        REVIEW OF SYSTEMS:  A 10-point review of systems is negative except for the above mentioned in the HPI.    OBJECTIVE:     Most Recent Vitals:  Temp: 98 °F (36.7 °C) (05/05/18 0337)  Pulse: 91 (05/05/18 0337)  Resp: 18 " (05/05/18 0337)  BP: (!) 131/94 (05/05/18 0337)    24-Hour Vitals:  Temp:  [98 °F (36.7 °C)-98.1 °F (36.7 °C)]   Pulse:  [80-94]   Resp:  [18]   BP: (131-155)/(80-96)     24-Hour I&O:No intake or output data in the 24 hours ending 05/05/18 0949    PHYSICAL EXAM:  AAO, NAD, well developed and well nourished.  Head normocephalic, atraumatic.  Trachea midline, neck supple.  Respirations unlabored with good inspiratory effort.  Heart regular rate and rhythm.  Abdomen soft, nondistended, nontender to palpation.  Left forearm with area of swelling and induration and erythema over thinned skin area of boil, this was lanced with tiny 1 cm slit incision and small amount of pus mixed with blood expressed.       LABORATORY VALUES:    Recent Labs  Lab 05/03/18  0638   WBC 7.63   HGB 14.0   HCT 41.1        Recent Labs  Lab 05/03/18  0638      K 3.9      CO2 24   BUN 6   CREATININE 0.8   GLU 83   CALCIUM 8.9   MG 2.7*   AST 74*   ALT 94*   ALKPHOS 142*   BILITOT 0.4   PROT 7.4   ALBUMIN 3.6   No results for input(s): INR, PTT, LABHEPA, LACTATE, TROPONINI, CPK, CPKMB, MB, BNP in the last 72 hours.No results for input(s): PH, PCO2, PO2, HCO3 in the last 72 hours.    DIAGNOSTIC STUDIES:  None.    ASSESSMENT:     Gilmar Clemente is a 42 y.o. male admitted to [facility] on 5/3/2018 for Substance induced mood disorder      PLAN:  · Small area of abscess over left forearm lanced, no sizable cavity to pack.  · Recommend Bactrim DS x7 days.  · Dressing changes daily, then may leave open to air once healing or scabbed over and no longer draining.       Thank you, and please call if General Surgery can be of any further assistance in the future care of this patient.      Shirley Barbour MD  General Surgery, PGY-5  Pager: [phone]  Cell: [phone]

## 2018-05-05 NOTE — SUBJECTIVE & OBJECTIVE
"Interval History: visible on the barone.  Has left forearm abscess. General surgery consult has been notified.    Family History     Problem Relation (Age of Onset)    Alcohol abuse Paternal Grandmother    No Known Problems Mother, Father        Social History Main Topics    Smoking status: Current Every Day Smoker     Packs/day: 1.00     Years: 30.00     Types: Cigarettes    Smokeless tobacco: Never Used    Alcohol use Yes      Comment: drinkl " alot everyday- wine beer whatever I get my hands on"    Drug use: Yes     Types: Cocaine, Marijuana      Comment: "In the past, but not recently"    Sexual activity: Yes     Partners: Female     Psychotherapeutics     Start     Stop Route Frequency Ordered    05/08/18 0900  diazePAM tablet 2 mg      05/09 0859 Oral 2 times daily 05/04/18 1038    05/07/18 0900  diazePAM tablet 5 mg      05/08 0859 Oral 2 times daily 05/04/18 1038    05/06/18 0900  diazePAM tablet 7 mg      05/07 0859 Oral 2 times daily 05/04/18 1038    05/05/18 0900  diazePAM tablet 10 mg      05/06 0859 Oral 2 times daily 05/04/18 1038    05/03/18 1619  LORazepam tablet 2 mg      -- Oral Every 4 hours PRN 05/03/18 1619           Review of Systems   Constitutional: Negative.      Objective:     Vital Signs (Most Recent):  Temp: 98 °F (36.7 °C) (05/05/18 0337)  Pulse: 91 (05/05/18 0337)  Resp: 18 (05/05/18 0337)  BP: (!) 131/94 (05/05/18 0337) Vital Signs (24h Range):  Temp:  [98 °F (36.7 °C)-98.1 °F (36.7 °C)] 98 °F (36.7 °C)  Pulse:  [80-94] 91  Resp:  [18] 18  BP: (131-155)/(80-96) 131/94     Height: 5' 11" (180.3 cm)  Weight: 79.9 kg (176 lb 2.4 oz)  Body mass index is 24.57 kg/m².    No intake or output data in the 24 hours ending 05/05/18 0906    Physical Exam   Constitutional: He appears well-nourished.   Mental Status Exam:  Appearance: unremarkable, age appropriate  Grooming: appropriate to situation  Arousal: alert, awake  Behavior/Cooperation: normal, cooperative  Speech: normal tone, normal " rate, normal pitch, normal volume  Language: appropriate english vocabulary  Mood: neutral  Affect: constricted  Thought Process: normal and logical  Thought Content: no SI/HI  Associations: no loose associations noted  Orientation: grossly intact  Memory: Grossly intact  Cognition: grossly intact  Insight: limited  Judgment: limited       Significant Labs:   Last 24 Hours:   Recent Lab Results     None

## 2018-05-06 PROCEDURE — S4991 NICOTINE PATCH NONLEGEND: HCPCS | Performed by: STUDENT IN AN ORGANIZED HEALTH CARE EDUCATION/TRAINING PROGRAM

## 2018-05-06 PROCEDURE — 25000003 PHARM REV CODE 250: Performed by: PSYCHIATRY & NEUROLOGY

## 2018-05-06 PROCEDURE — 12400001 HC PSYCH SEMI-PRIVATE ROOM

## 2018-05-06 PROCEDURE — 25000003 PHARM REV CODE 250: Performed by: NURSE PRACTITIONER

## 2018-05-06 PROCEDURE — 99232 SBSQ HOSP IP/OBS MODERATE 35: CPT | Mod: AF,HB,, | Performed by: PSYCHIATRY & NEUROLOGY

## 2018-05-06 PROCEDURE — 25000003 PHARM REV CODE 250: Performed by: STUDENT IN AN ORGANIZED HEALTH CARE EDUCATION/TRAINING PROGRAM

## 2018-05-06 RX ADMIN — DIAZEPAM 2 MG: 5 TABLET ORAL at 08:05

## 2018-05-06 RX ADMIN — IBUPROFEN 400 MG: 400 TABLET, FILM COATED ORAL at 02:05

## 2018-05-06 RX ADMIN — HYDROXYZINE PAMOATE 25 MG: 25 CAPSULE ORAL at 02:05

## 2018-05-06 RX ADMIN — SULFAMETHOXAZOLE AND TRIMETHOPRIM 1 TABLET: 800; 160 TABLET ORAL at 08:05

## 2018-05-06 RX ADMIN — NICOTINE 1 PATCH: 14 PATCH, EXTENDED RELEASE TRANSDERMAL at 08:05

## 2018-05-06 RX ADMIN — Medication 100 MG: at 08:05

## 2018-05-06 RX ADMIN — HYDROXYZINE PAMOATE 25 MG: 25 CAPSULE ORAL at 08:05

## 2018-05-06 RX ADMIN — FOLIC ACID 1 MG: 1 TABLET ORAL at 08:05

## 2018-05-06 RX ADMIN — IBUPROFEN 400 MG: 400 TABLET, FILM COATED ORAL at 08:05

## 2018-05-06 RX ADMIN — THERA TABS 1 TABLET: TAB at 08:05

## 2018-05-06 RX ADMIN — DIAZEPAM 7 MG: 5 TABLET ORAL at 08:05

## 2018-05-06 NOTE — SUBJECTIVE & OBJECTIVE
"Interval History:   Bactrim DS was started for cellulitis.  Pt. Calm, cooperative.    Family History     Problem Relation (Age of Onset)    Alcohol abuse Paternal Grandmother    No Known Problems Mother, Father        Social History Main Topics    Smoking status: Current Every Day Smoker     Packs/day: 1.00     Years: 30.00     Types: Cigarettes    Smokeless tobacco: Never Used    Alcohol use Yes      Comment: drinkl " alot everyday- wine beer whatever I get my hands on"    Drug use: Yes     Types: Cocaine, Marijuana      Comment: "In the past, but not recently"    Sexual activity: Yes     Partners: Female     Psychotherapeutics     Start     Stop Route Frequency Ordered    05/08/18 0900  diazePAM tablet 2 mg      05/09 0859 Oral 2 times daily 05/04/18 1038    05/07/18 0900  diazePAM tablet 5 mg      05/08 0859 Oral 2 times daily 05/04/18 1038    05/06/18 0900  diazePAM tablet 7 mg      05/07 0859 Oral 2 times daily 05/04/18 1038    05/03/18 1619  LORazepam tablet 2 mg      -- Oral Every 4 hours PRN 05/03/18 1619           Review of Systems   Constitutional: Negative.      Objective:     Vital Signs (Most Recent):  Temp: 97.8 °F (36.6 °C) (05/06/18 0820)  Pulse: 77 (05/06/18 0820)  Resp: 16 (05/06/18 0820)  BP: (!) 138/92 (05/06/18 0820) Vital Signs (24h Range):  Temp:  [97.7 °F (36.5 °C)-97.8 °F (36.6 °C)] 97.8 °F (36.6 °C)  Pulse:  [] 77  Resp:  [16-17] 16  BP: (135-159)/(81-92) 138/92     Height: 5' 11" (180.3 cm)  Weight: 79.9 kg (176 lb 2.4 oz)  Body mass index is 24.57 kg/m².    No intake or output data in the 24 hours ending 05/06/18 0920    Physical Exam   Constitutional: He appears well-developed.   Mental Status Exam:  Appearance: unremarkable, age appropriate  Grooming: appropriate to situation  Arousal: alert, awake  Behavior/Cooperation: normal, cooperative  Speech: normal tone, normal rate, normal pitch, normal volume  Language: appropriate english vocabulary  Mood: neutral  Affect: " normal  Thought Process: goal-directed  Thought Content: no SI  Associations: no loose associations noted  Orientation: grossly intact  Cognition: grossly intact  Insight: limited  Judgment: limited         Significant Labs:   Last 24 Hours:   Recent Lab Results     None

## 2018-05-06 NOTE — HOSPITAL COURSE
"Visible on the barone.    5/7/2018 : Patient notified of acceptance into [facility].  Continuing valium taper and tolerating it well.  Planned discharge tomorrow morning to rehab.  Patient denies SI.  Mood is stable.     5/8/2018 : Patient has decided to attend the [facility] program rather than [facility].  He is familiar with both programs and prefers a [employer] program.  He is also interested in the employment opportunities through [employer].  Denies symptoms of withdrawal.  Mood is "good".  Patient counseled that he will be provided with a prescription for a bactrim cream for his abscess.  Patient is hopeful for the future.     "
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
## 2018-05-06 NOTE — PLAN OF CARE
Problem: Patient Care Overview (Adult)  Goal: Plan of Care Review  Outcome: Ongoing (interventions implemented as appropriate)  Patient calm and cooperative. Denies SI/HI/AVH. Med compliant. Left elbow wound dressing changed with scanty bloody drainage. Med compliant.

## 2018-05-07 PROBLEM — L02.91 ABSCESS: Status: ACTIVE | Noted: 2018-05-07

## 2018-05-07 PROBLEM — R74.01 TRANSAMINITIS: Status: ACTIVE | Noted: 2018-05-07

## 2018-05-07 PROBLEM — F10.930 ALCOHOL WITHDRAWAL SYNDROME WITHOUT COMPLICATION: Status: RESOLVED | Noted: 2018-05-04 | Resolved: 2018-05-07

## 2018-05-07 LAB
ALBUMIN SERPL BCP-MCNC: 3.5 G/DL
ALP SERPL-CCNC: 140 U/L
ALT SERPL W/O P-5'-P-CCNC: 133 U/L
ANION GAP SERPL CALC-SCNC: 9 MMOL/L
AST SERPL-CCNC: 80 U/L
BILIRUB SERPL-MCNC: 0.3 MG/DL
BUN SERPL-MCNC: 10 MG/DL
CALCIUM SERPL-MCNC: 9.6 MG/DL
CHLORIDE SERPL-SCNC: 105 MMOL/L
CO2 SERPL-SCNC: 24 MMOL/L
CREAT SERPL-MCNC: 1.1 MG/DL
EST. GFR  (AFRICAN AMERICAN): >60 ML/MIN/1.73 M^2
EST. GFR  (NON AFRICAN AMERICAN): >60 ML/MIN/1.73 M^2
GLUCOSE SERPL-MCNC: 87 MG/DL
POTASSIUM SERPL-SCNC: 4.8 MMOL/L
PROT SERPL-MCNC: 7.7 G/DL
SODIUM SERPL-SCNC: 138 MMOL/L

## 2018-05-07 PROCEDURE — 97165 OT EVAL LOW COMPLEX 30 MIN: CPT

## 2018-05-07 PROCEDURE — 36415 COLL VENOUS BLD VENIPUNCTURE: CPT

## 2018-05-07 PROCEDURE — 25000003 PHARM REV CODE 250: Performed by: STUDENT IN AN ORGANIZED HEALTH CARE EDUCATION/TRAINING PROGRAM

## 2018-05-07 PROCEDURE — 99232 SBSQ HOSP IP/OBS MODERATE 35: CPT | Mod: AF,HB,, | Performed by: PSYCHIATRY & NEUROLOGY

## 2018-05-07 PROCEDURE — 12400001 HC PSYCH SEMI-PRIVATE ROOM

## 2018-05-07 PROCEDURE — 97150 GROUP THERAPEUTIC PROCEDURES: CPT

## 2018-05-07 PROCEDURE — 25000003 PHARM REV CODE 250: Performed by: PSYCHIATRY & NEUROLOGY

## 2018-05-07 PROCEDURE — 80053 COMPREHEN METABOLIC PANEL: CPT

## 2018-05-07 PROCEDURE — 25000003 PHARM REV CODE 250: Performed by: NURSE PRACTITIONER

## 2018-05-07 PROCEDURE — S4991 NICOTINE PATCH NONLEGEND: HCPCS | Performed by: STUDENT IN AN ORGANIZED HEALTH CARE EDUCATION/TRAINING PROGRAM

## 2018-05-07 PROCEDURE — 90853 GROUP PSYCHOTHERAPY: CPT | Mod: HP,HB,, | Performed by: PSYCHOLOGIST

## 2018-05-07 RX ADMIN — FOLIC ACID 1 MG: 1 TABLET ORAL at 08:05

## 2018-05-07 RX ADMIN — Medication 100 MG: at 08:05

## 2018-05-07 RX ADMIN — SULFAMETHOXAZOLE AND TRIMETHOPRIM 1 TABLET: 800; 160 TABLET ORAL at 08:05

## 2018-05-07 RX ADMIN — HYDROXYZINE PAMOATE 25 MG: 25 CAPSULE ORAL at 08:05

## 2018-05-07 RX ADMIN — THERA TABS 1 TABLET: TAB at 08:05

## 2018-05-07 RX ADMIN — DIAZEPAM 5 MG: 5 TABLET ORAL at 08:05

## 2018-05-07 RX ADMIN — NICOTINE 1 PATCH: 14 PATCH, EXTENDED RELEASE TRANSDERMAL at 08:05

## 2018-05-07 NOTE — PROGRESS NOTES
05/07/18 0900 05/07/18 1100 05/07/18 1300   [facility] Group Therapy   Group Name Community Reintegration Mental Awareness Therapeutic Recreation   Specific Interventions Current Events Cognitive Stimulation Training Skilled Activity Crafts   Participation Level Active;Appropriate;Attentive Active;Appropriate;Attentive --    Participation Quality Cooperative;Social Cooperative;Social Lack of Interest;Refused   Insight/Motivation Good Good --    Affect/Mood Display Appropriate Appropriate --    Cognition Alert;Oriented Alert --

## 2018-05-07 NOTE — SUBJECTIVE & OBJECTIVE
"Interval History: see hospital course     Family History     Problem Relation (Age of Onset)    Alcohol abuse Paternal Grandmother    No Known Problems Mother, Father        Social History Main Topics    Smoking status: Current Every Day Smoker     Packs/day: 1.00     Years: 30.00     Types: Cigarettes    Smokeless tobacco: Never Used    Alcohol use Yes      Comment: drinkl " alot everyday- wine beer whatever I get my hands on"    Drug use: Yes     Types: Cocaine, Marijuana      Comment: "In the past, but not recently"    Sexual activity: Yes     Partners: Female     Psychotherapeutics     Start     Stop Route Frequency Ordered    05/08/18 0900  diazePAM tablet 2 mg      05/09 0859 Oral 2 times daily 05/04/18 1038    05/07/18 0900  diazePAM tablet 5 mg      05/08 0859 Oral 2 times daily 05/04/18 1038    05/03/18 1619  LORazepam tablet 2 mg      -- Oral Every 4 hours PRN 05/03/18 1619           Review of Systems  Objective:     Vital Signs (Most Recent):  Temp: 97.9 °F (36.6 °C) (05/06/18 1930)  Pulse: 79 (05/06/18 1930)  Resp: 16 (05/06/18 1930)  BP: 133/76 (05/06/18 1930) Vital Signs (24h Range):  Temp:  [97.9 °F (36.6 °C)-98 °F (36.7 °C)] 97.9 °F (36.6 °C)  Pulse:  [79-96] 79  Resp:  [16] 16  BP: (121-155)/(75-87) 133/76     Height: 5' 11" (180.3 cm)  Weight: 79.9 kg (176 lb 2.4 oz)  Body mass index is 24.57 kg/m².    No intake or output data in the 24 hours ending 05/07/18 0848    Physical Exam      Mental Status Exam:  Appearance: age appropriate, normal weight, disheveled  Behavior/Cooperation:  friendly and cooperative  Speech: normal tone, normal rate, normal pitch, normal volume  Language: uses words appropriately; NO aphasia or dysarthria  Mood: "pretty good"  Affect:  congruent with mood and appropriate to situation/content   Thought Process: normal and logical  Thought Content: normal, no suicidality, no homicidality, delusions, or paranoia  Level of Consciousness: Alert and Oriented x3  Memory:  " Grossly Intact  Attention/concentration: appropriate for age/education.   Fund of Knowledge: appears adequate  Insight: Intact  Judgment: Intact    Significant Labs:   Last 24 Hours:   Recent Lab Results     None          Significant Imaging: None
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
## 2018-05-07 NOTE — PLAN OF CARE
Problem: Patient Care Overview (Adult)  Goal: Plan of Care Review  Outcome: Ongoing (interventions implemented as appropriate)  Calm, cooperative. Currently does not endorse feelings of depression or suicidal ideations. No observable symptoms of alcohol withdrawal. Medication compliant. Vistaril administered upon patient request to aid with insomnia. 7 hours of sleep observed after Vistaril administration. Modified visual contact maintained per MD orders.

## 2018-05-07 NOTE — PROGRESS NOTES
Group Psychotherapy (PhD/LCSW)    Site: [facility]    Clinical status of patient: Inpatient    Date: 5/7/2018    Group Focus: Life Skills    Length of service: 12977 - 35-40 minutes    Number of patients in attendance: 7      Referred by: Acute Psychiatry Unit Treatment Team    Target symptoms: Alcohol Abuse and Mood Disorder    Patient's response to treatment: Active Listening and Self-disclosure    Progress toward goals: Progressing slowly    Interval History: Pt appeared alert and attentive in group. Pt responded actively and appropriately in a group discussion of the danger of expectations when they are excessively high or excessively low. Pt discussed the way he felt he could control his drinking problem in the past without help from others.      Diagnosis: SIMD; alcohol dependence     Plan: Continue treatment on [facility]

## 2018-05-07 NOTE — ASSESSMENT & PLAN NOTE
General surgery consulted, performed I & D on abscess on left forearm   Patient started on 7 day course of BID bactrim

## 2018-05-08 VITALS
HEIGHT: 71 IN | BODY MASS INDEX: 24.66 KG/M2 | TEMPERATURE: 97 F | HEART RATE: 74 BPM | WEIGHT: 176.13 LBS | DIASTOLIC BLOOD PRESSURE: 81 MMHG | SYSTOLIC BLOOD PRESSURE: 119 MMHG | RESPIRATION RATE: 16 BRPM

## 2018-05-08 PROCEDURE — 90853 GROUP PSYCHOTHERAPY: CPT | Mod: HP,HB,, | Performed by: PSYCHOLOGIST

## 2018-05-08 PROCEDURE — 25000003 PHARM REV CODE 250: Performed by: STUDENT IN AN ORGANIZED HEALTH CARE EDUCATION/TRAINING PROGRAM

## 2018-05-08 PROCEDURE — 99238 HOSP IP/OBS DSCHRG MGMT 30/<: CPT | Mod: AF,HB,, | Performed by: PSYCHIATRY & NEUROLOGY

## 2018-05-08 PROCEDURE — S4991 NICOTINE PATCH NONLEGEND: HCPCS | Performed by: STUDENT IN AN ORGANIZED HEALTH CARE EDUCATION/TRAINING PROGRAM

## 2018-05-08 PROCEDURE — 25000003 PHARM REV CODE 250: Performed by: PSYCHIATRY & NEUROLOGY

## 2018-05-08 PROCEDURE — 25000003 PHARM REV CODE 250: Performed by: NURSE PRACTITIONER

## 2018-05-08 RX ORDER — SULFAMETHOXAZOLE AND TRIMETHOPRIM 800; 160 MG/1; MG/1
1 TABLET ORAL 2 TIMES DAILY
Qty: 8 TABLET | Refills: 0 | Status: SHIPPED | OUTPATIENT
Start: 2018-05-08 | End: 2018-05-08

## 2018-05-08 RX ORDER — SULFAMETHOXAZOLE AND TRIMETHOPRIM 800; 160 MG/1; MG/1
1 TABLET ORAL 2 TIMES DAILY
Qty: 8 TABLET | Refills: 0 | Status: SHIPPED | OUTPATIENT
Start: 2018-05-08 | End: 2018-05-12

## 2018-05-08 RX ADMIN — DIAZEPAM 2 MG: 2 TABLET ORAL at 08:05

## 2018-05-08 RX ADMIN — NICOTINE 1 PATCH: 14 PATCH, EXTENDED RELEASE TRANSDERMAL at 08:05

## 2018-05-08 RX ADMIN — Medication 100 MG: at 08:05

## 2018-05-08 RX ADMIN — FOLIC ACID 1 MG: 1 TABLET ORAL at 08:05

## 2018-05-08 RX ADMIN — SULFAMETHOXAZOLE AND TRIMETHOPRIM 1 TABLET: 800; 160 TABLET ORAL at 08:05

## 2018-05-08 RX ADMIN — THERA TABS 1 TABLET: TAB at 08:05
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
## 2018-05-08 NOTE — DISCHARGE SUMMARY
"[facility]  Psychiatry  Discharge Summary      Patient Name: Gilmar Clemente  MRN: [id number]  Admission Date: 5/3/2018  Hospital Length of Stay: 5 days  Discharge Date and Time:  05/08/2018 9:08 AM  Attending Physician: Edmundo Nayak MD   Discharging Provider: Marianan Browne MD  Primary Care Provider: Primary Doctor Bhargavi    HPI:   Gilmar Clemente is a 42 year old male with no significant PMH who presents with SI and alcohol intoxication which prompted consult to psychiatry.  Pt was lying on ED hospital stretcher with nurse at bedside upon my arrival.  Pt AAOx3.  Speech is clear with normal tone.  Thought processes are organized.  Pt endorses active suicidal ideations with plan to jump off of a bridge or provoke a  by attempting to grab his gun.       Patient reports hx of alcoholism and depression since he was 16 years of age.  Reports drinking a fifth of vodka on an average day.  He admits to drank heavily last night and was picked up off of the street.  Patient denies any homicidal ideations, auditory hallucinations, or visual hallucinations. Patient denies any chest pain, SOB, nausea or vomiting. Serum ETOH was 185 at 0638 and dropped to 114 at 1030.  Urine toxicology negative for other drugs of abuse.  Pt denies current use of street drugs but stated, " I've tried just about everything but alcohol is what I'm addicted to".  Endorses depression symptoms of dysphoria, anhedonia, avolition, social withdrawal, dysregulated appetite and sleep, and feelings of hopelessness.       Psychosocial History:  Currently homeless; sleeps on the streets of the [facility].  Pt originally form [place] and moved to [facility] 3 years ago.  Homeless for the past 3 years.  Pt has family history of grandfather dying from alcoholism.  Most recent treatment was the [facility]. He wants to return to program. Pt reported that he completed " "only three months of the 6-month program because he thought he was strong enough to be on his own.  State, " it's impossible to whit-knuckle if you're living on the streets".       Psychiatric Medications: currently (none)     Reports past failed trials include but not limited to:  Vivitrol injections, Celexa, Prozac, Wellbutrin.     Hospital Course:   Visible on the barone.    5/7/2018 : Patient notified of acceptance into [facility].  Continuing valium taper and tolerating it well.  Planned discharge tomorrow morning to rehab.  Patient denies SI.  Mood is stable.     5/8/2018 : Patient has decided to attend [facility] rather than [facility].  He is familiar with both programs and prefers a [facility] program.  He is also interested in the employment opportunities through [employer].  Denies symptoms of withdrawal.  Mood is "good".  Patient counseled that he will be provided with a prescription for a bactrim cream for his abscess.  Patient is hopeful for the future.        * No surgery found *     Consults:   Consults         Status Ordering Provider     Inpatient consult to General Surgery  Once     Provider:  (Not yet assigned)    Completed KOJO HANSEN        Physical Exam        PMHx  Past Medical History Reviewed    ROS  General ROS: negative for - fatigue or sleep disturbance      EXAMINATION    VITALS   Vitals:    05/07/18 1915   BP: 135/78   Pulse: 95   Resp: 16   Temp: 98.1 °F (36.7 °C)       CONSTITUTIONAL  General Appearance: fair grooming, calm and cooperative    MUSCULOSKELETAL  Muscle Strength and Tone: grossly normal  Abnormal Involuntary Movements: grossly normal  Gait and Station: grossly normal    PSYCHIATRIC   Level of Consciousness: awake and alert  Orientation: place, situation, person, date  Grooming: fair  Psychomotor Behavior: appropriate  Speech: normal rate, tone, volume  Language: fluent english  Mood: steady  Affect: full  Thought Process: linear and goal " oriented  Associations: appopriate  Thought Content: denies SI/HI/AVH  Memory: grossly intact  Attention: grossly intact  Fund of Knowledge: grossly intact  Insight: fair  Judgment: fair    Significant Labs:   Last 24 Hours:   Recent Lab Results       05/07/18  0950      Albumin 3.5     Alkaline Phosphatase 140(H)     (H)     Anion Gap 9     AST 80(H)     Total Bilirubin 0.3  Comment:  For infants and newborns, interpretation of results should be based  on gestational age, weight and in agreement with clinical  observations.  Premature Infant recommended reference ranges:  Up to 24 hours.............<8.0 mg/dL  Up to 48 hours............<12.0 mg/dL  3-5 days..................<15.0 mg/dL  6-29 days.................<15.0 mg/dL       BUN, Bld 10     Calcium 9.6     Chloride 105     CO2 24     Creatinine 1.1     eGFR if African American >60.0     eGFR if non  >60.0  Comment:  Calculation used to obtain the estimated glomerular filtration  rate (eGFR) is the CKD-EPI equation.        Glucose 87     Potassium 4.8     Total Protein 7.7     Sodium 138           Significant Imaging: None    Smoking Cessation:   Does the patient smoke? Yes  Does the patient want a prescription for Smoking Cessation? No  Does the patient want counseling for Smoking Cessation? No     Alcohol Usage:   Have you expressed concern for patient unhealthy alcohol use?  Yes  Have you provided feedback linking patients drinking to his/her health issue(s)?  Yes  Has patient received education about recommended drinking limits?  Yes  Has patient been advised to cut down drinking or abstain from drinking?  Yes  Has patient been referred for treatment if indicated?  Yes       Pending Diagnostic Studies:     None        Final Active Diagnoses:    Diagnosis Date Noted POA    PRINCIPAL PROBLEM:  Substance induced mood disorder [F19.94] 05/03/2018 Yes     Chronic    Abscess [L02.91] 05/07/2018 Yes    Transaminitis [R74.0] 05/07/2018 Yes     Tobacco use disorder [F17.200] 05/04/2018 Yes     Chronic    Alcohol use disorder, severe, dependence [F10.20] 05/03/2018 Yes     Chronic      Problems Resolved During this Admission:    Diagnosis Date Noted Date Resolved POA    Alcohol withdrawal syndrome without complication [F10.230] 05/04/2018 05/07/2018 Yes      * Substance induced mood disorder    Denies SI. Calm.  Feeling safe and denies depression.         Tobacco use disorder    Nicotine patch provided         Alcohol use disorder, severe, dependence    Completed Valium taper, vital signs stable.  Patient to attend [facility] after hospital discharge.              Functional Condition: Independent ambulation    Discharged Condition: good    Disposition: Home or Self Care    Follow Up:  Follow-up Information     [facility] On 5/8/2018.    Specialties:  Internal Medicine, Family Medicine  Why:  primary care follow up.    Contact information:  [address]  [facility] [address]  [phone]             [facility]. Go on 5/8/2018.    Specialties:  Behavioral Health, Psychiatry, Psychology  Why:  substance abuse treatment.  Contact information:  [address]  [facility] [id number] [phone]                 Patient Instructions:     Diet Adult Regular     Activity as tolerated     Notify your health care provider if you experience any of the following:   Order Comments: Worsening of symptoms, SI/HI/AVH     Leave dressing on - Keep it clean, dry, and intact until clinic visit     Reason for not Prescribing Nicotine Replacement   Order Specific Question Answer Comments   Reason for not Prescribing: Other (specify)    Other (Specify): patient declined        Medications:  Bactrim 800-160 mg PO BID for 4 days    Is patient being discharged on multiple antipsychotics? No        Total time:15 with greater than 50% of this time spent in counseling and/or coordination of care.      All elements of the transition record were discussed with the patient at discharge and patient agrees to this plan.    Marianna Browne MD  Psychiatry  [facility]

## 2018-05-08 NOTE — SUBJECTIVE & OBJECTIVE
"Interval History: see hospital course     Family History     Problem Relation (Age of Onset)    Alcohol abuse Paternal Grandmother    No Known Problems Mother, Father        Social History Main Topics    Smoking status: Current Every Day Smoker     Packs/day: 1.00     Years: 30.00     Types: Cigarettes    Smokeless tobacco: Never Used    Alcohol use Yes      Comment: drinkl " alot everyday- wine beer whatever I get my hands on"    Drug use: Yes     Types: Cocaine, Marijuana      Comment: "In the past, but not recently"    Sexual activity: Yes     Partners: Female     Psychotherapeutics     Start     Stop Route Frequency Ordered    05/08/18 0900  diazePAM tablet 2 mg      05/09 0859 Oral 2 times daily 05/04/18 1038    05/03/18 1619  LORazepam tablet 2 mg      -- Oral Every 4 hours PRN 05/03/18 1619           Review of Systems  Objective:     Vital Signs (Most Recent):  Temp: 98.1 °F (36.7 °C) (05/07/18 1915)  Pulse: 95 (05/07/18 1915)  Resp: 16 (05/07/18 1915)  BP: 135/78 (05/07/18 1915) Vital Signs (24h Range):  Temp:  [98.1 °F (36.7 °C)] 98.1 °F (36.7 °C)  Pulse:  [95-96] 95  Resp:  [16-17] 16  BP: (135-142)/(78-80) 135/78     Height: 5' 11" (180.3 cm)  Weight: 79.9 kg (176 lb 2.4 oz)  Body mass index is 24.57 kg/m².    No intake or output data in the 24 hours ending 05/08/18 0852    Physical Exam      Mental Status Exam:  Appearance: age appropriate, casually dressed  Behavior/Cooperation:  friendly and cooperative  Speech: normal tone, normal rate, normal pitch, normal volume  Language: uses words appropriately; NO aphasia or dysarthria  Mood: steady  Affect:  congruent with mood and appropriate to situation/content   Thought Process: normal and logical  Thought Content: normal, no suicidality, no homicidality, delusions, or paranoia  Level of Consciousness: Alert and Oriented x3  Memory:  Grossly Intact  Attention/concentration: appropriate for age/education.   Fund of Knowledge: appears adequate  Insight: " Intact  Judgment: Intact    Significant Labs:   Last 24 Hours:   Recent Lab Results       05/07/18  0950      Albumin 3.5     Alkaline Phosphatase 140(H)     (H)     Anion Gap 9     AST 80(H)     Total Bilirubin 0.3  Comment:  For infants and newborns, interpretation of results should be based  on gestational age, weight and in agreement with clinical  observations.  Premature Infant recommended reference ranges:  Up to 24 hours.............<8.0 mg/dL  Up to 48 hours............<12.0 mg/dL  3-5 days..................<15.0 mg/dL  6-29 days.................<15.0 mg/dL       BUN, Bld 10     Calcium 9.6     Chloride 105     CO2 24     Creatinine 1.1     eGFR if African American >60.0     eGFR if non  >60.0  Comment:  Calculation used to obtain the estimated glomerular filtration  rate (eGFR) is the CKD-EPI equation.        Glucose 87     Potassium 4.8     Total Protein 7.7     Sodium 138           Significant Imaging: None

## 2018-05-08 NOTE — NURSING
Patient was discharged to [facility].  They provided transportation.  Mood and behavior are appropriate.  Patient is verbalizing positives about discharge.  Discharge teaching done including follow up appointments.  Prescription for Bactrim sent with patient.  No complaints of pain or problems noted.

## 2018-05-08 NOTE — PROGRESS NOTES
Group Psychotherapy (PhD/LCSW)    Site: [facility]    Clinical status of patient: Inpatient    Date: 5/8/2018    Group Focus: Life Skills    Length of service: 38691 - 35-40 minutes    Number of patients in attendance: 10      Referred by: Acute Psychiatry Unit Treatment Team    Target symptoms: Alcohol Abuse and Mood Disorder    Patient's response to treatment: Active Listening and Self-disclosure    Progress toward goals: Progressing slowly    Interval History: Pt appeared alert and attentive in group. Pt participated briefly but appropriately in a group focused on the way differences in gender, fx of origin, ethnicity, etc can impair the communication process when not taken into account      Diagnosis: SIMD; alcohol dependence     Plan: See Discharge Summary dated 5/8/18

## 2018-05-08 NOTE — ASSESSMENT & PLAN NOTE
Completed Valium taper, vital signs stable.  Patient to attend [facility] program after hospital discharge.

## 2018-10-08 ENCOUNTER — HOSPITAL ENCOUNTER (EMERGENCY)
Facility: HOSPITAL | Age: 43
Discharge: HOME | End: 2018-10-08
Attending: EMERGENCY MEDICINE
Payer: MEDICAID

## 2018-10-08 VITALS
RESPIRATION RATE: 18 BRPM | WEIGHT: 195 LBS | HEART RATE: 100 BPM | BODY MASS INDEX: 27.3 KG/M2 | SYSTOLIC BLOOD PRESSURE: 144 MMHG | OXYGEN SATURATION: 97 % | HEIGHT: 71 IN | DIASTOLIC BLOOD PRESSURE: 91 MMHG | TEMPERATURE: 98 F

## 2018-10-08 DIAGNOSIS — L02.416 ABSCESS OF KNEE, LEFT: ICD-10-CM

## 2018-10-08 DIAGNOSIS — L03.116 CELLULITIS OF LEFT KNEE: Primary | ICD-10-CM

## 2018-10-08 PROCEDURE — 99284 EMERGENCY DEPT VISIT MOD MDM: CPT | Mod: 25,,, | Performed by: EMERGENCY MEDICINE

## 2018-10-08 PROCEDURE — 25000003 PHARM REV CODE 250: Performed by: EMERGENCY MEDICINE

## 2018-10-08 PROCEDURE — 99283 EMERGENCY DEPT VISIT LOW MDM: CPT

## 2018-10-08 PROCEDURE — 10060 I&D ABSCESS SIMPLE/SINGLE: CPT

## 2018-10-08 PROCEDURE — 10060 I&D ABSCESS SIMPLE/SINGLE: CPT | Mod: LT,,, | Performed by: EMERGENCY MEDICINE

## 2018-10-08 RX ORDER — IBUPROFEN 600 MG/1
600 TABLET ORAL
Status: COMPLETED | OUTPATIENT
Start: 2018-10-08 | End: 2018-10-08

## 2018-10-08 RX ORDER — SULFAMETHOXAZOLE AND TRIMETHOPRIM 800; 160 MG/1; MG/1
1 TABLET ORAL
Status: COMPLETED | OUTPATIENT
Start: 2018-10-08 | End: 2018-10-08

## 2018-10-08 RX ORDER — SULFAMETHOXAZOLE AND TRIMETHOPRIM 800; 160 MG/1; MG/1
1 TABLET ORAL 2 TIMES DAILY
Qty: 19 TABLET | Refills: 0 | Status: SHIPPED | OUTPATIENT
Start: 2018-10-08 | End: 2018-10-18

## 2018-10-08 RX ORDER — LIDOCAINE HYDROCHLORIDE 10 MG/ML
10 INJECTION INFILTRATION; PERINEURAL
Status: COMPLETED | OUTPATIENT
Start: 2018-10-08 | End: 2018-10-08

## 2018-10-08 RX ADMIN — SULFAMETHOXAZOLE AND TRIMETHOPRIM 1 TABLET: 800; 160 TABLET ORAL at 01:10

## 2018-10-08 RX ADMIN — LIDOCAINE HYDROCHLORIDE 10 ML: 10 INJECTION, SOLUTION INFILTRATION; PERINEURAL at 12:10

## 2018-10-08 RX ADMIN — IBUPROFEN 600 MG: 600 TABLET ORAL at 12:10

## 2018-10-08 NOTE — ED PROVIDER NOTES
"Encounter Date: 10/8/2018    SCRIBE #1 NOTE: I, Ashtyn Torres, am scribing for, and in the presence of,  Dr. Kiran. I have scribed the entire note.       History     Chief Complaint   Patient presents with    Knee Pain     something bit my r knee in sleep few nights ago, red and swollen     Time patient was seen by the provider: 12:23 PM      The patient is a 42 y.o. Male who presents to the ED with a complaint of worsening left knee pain x4 days. The patient states symptoms began with a small bump on his knee that he thought was a possible insect bite. The area to his knee has now progressively worsened with redness and increased swelling. He reports pain worsens with pressure and movement. He also notes of associated loss of appetite and occasional sweats last night. Patient states there has been some thick and yellow drainage from the knee when he popped it.          The history is provided by the patient and medical records.     Review of patient's allergies indicates:  No Known Allergies  Past Medical History:   Diagnosis Date    Alcohol abuse     Depression     History of psychiatric hospitalization     Hx of psychiatric care     Psychiatric exam requested by authority     Psychiatric problem     Suicide attempt     Therapy     Withdrawal symptoms, alcohol      History reviewed. No pertinent surgical history.  Family History   Problem Relation Age of Onset    No Known Problems Mother     No Known Problems Father     Alcohol abuse Paternal Grandmother      Social History     Tobacco Use    Smoking status: Current Every Day Smoker     Packs/day: 1.00     Years: 30.00     Pack years: 30.00     Types: Cigarettes    Smokeless tobacco: Never Used   Substance Use Topics    Alcohol use: Yes     Comment: drinkl " alot everyday- wine beer whatever I get my hands on"    Drug use: Yes     Types: Cocaine, Marijuana     Comment: "In the past, but not recently"     Review of Systems   Constitutional: " Positive for appetite change (decreased). Negative for fever.        +Night sweats   HENT: Negative for sore throat.    Respiratory: Negative for shortness of breath.    Gastrointestinal: Negative for nausea.   Musculoskeletal: Negative for back pain.        +Left knee pain  +Left knee swelling   Skin: Positive for color change (redness on left knee) and wound (left knee).   Neurological: Negative for weakness.       Physical Exam     Initial Vitals [10/08/18 1210]   BP Pulse Resp Temp SpO2   (!) 144/91 100 18 98.3 °F (36.8 °C) 97 %      MAP       --         Physical Exam    Nursing note and vitals reviewed.  Constitutional: He appears well-developed and well-nourished. No distress.   HENT:   Head: Normocephalic and atraumatic.   Right Ear: External ear normal.   Left Ear: External ear normal.   Mouth/Throat: Oropharynx is clear and moist.   Eyes: EOM are normal. Pupils are equal, round, and reactive to light.   Neck: Normal range of motion. Neck supple.   Cardiovascular: Normal rate, regular rhythm and normal heart sounds. Exam reveals no gallop and no friction rub.    No murmur heard.  Pulmonary/Chest: Breath sounds normal. No respiratory distress. He has no wheezes. He has no rhonchi. He has no rales.   Musculoskeletal: Normal range of motion.   6 by 4 cm area of erythema that is slightly fluctuant overlying the left patella.This area is exquisitely tender. There is no erythema extending proximally up the leg or posteriorly down the leg.   Pt has full range of motion of the knee and no tenderness of the joint line medially and laterally.   No calf tenderness or lower leg edema.    Neurological: He is alert and oriented to person, place, and time. He has normal strength. No cranial nerve deficit or sensory deficit.   Skin: Skin is warm and dry.   Psychiatric: His behavior is normal. Thought content normal.         ED Course   I & D - Incision and Drainage  Date/Time: 10/8/2018 1:05 PM  Performed by: Madelyn Parr  MD Magno  Authorized by: Madelyn Connell MD   Type: abscess  Body area: lower extremity  Location details: left leg  Anesthesia: local infiltration    Anesthesia:  Local Anesthetic: lidocaine 1% without epinephrine  Anesthetic total: 2 mL  Scalpel size: 11  Incision type: single straight  Complexity: simple  Drainage: bloody  Drainage amount: scant  Wound treatment: wound left open  Patient tolerance: Patient tolerated the procedure well with no immediate complications  Comments: Only bloody drainageWas obtained.  Pocket was explored with hemostat.  Swelling appeared to go down after procedure.  Wound was left open.  Patient tolerated procedure well.        Labs Reviewed - No data to display       Imaging Results    None          Medical Decision Making:   History:   Old Medical Records: I decided to obtain old medical records.  Initial Assessment:   42 y.o. Male patient with a complaint of painful lesion on left knee. There appears to be some mild cellulitis and possibly a small abscess. Will attempt I&D. I do not suspect septic joint. Will treat with bactrim.             Scribe Attestation:   Scribe #1: I performed the above scribed service and the documentation accurately describes the services I performed. I attest to the accuracy of the note.    Attending Attestation:           Physician Attestation for Scribe:      Comments: I, Dr. Madelyn Connell, personally performed the services described in this documentation. All medical record entries made by the scribe were at my direction and in my presence.  I have reviewed the chart and agree that the record reflects my personal performance and is accurate and complete. Madelyn Connell MD.  10:04 PM 10/08/2018      Attending ED Notes:   Patient was advised he should return if worsening, including spreading of redness or fever. He was instructed on dressing changes. I also answered questions he had on chronic right shoulder pain, however he follows up  with [facility] orthopedics in 3 weeks. I recommend he takes Ibuprofen PRN pain.              Clinical Impression:   The primary encounter diagnosis was Cellulitis of left knee. A diagnosis of Abscess of knee, left was also pertinent to this visit.      Disposition:   Disposition: Discharged  Condition: Stable                        Madelyn Connell MD  10/08/18 6503

## 2018-10-08 NOTE — ED TRIAGE NOTES
Patient arrives to ED with CC of bite to left knee, onset 4 nights ago. Patient reports pain with pressure or movement. Patient states he has had periods of loss of appetite and feeling hot. Patient also reports thick and yellow drainage from the area.

## 2018-10-08 NOTE — DISCHARGE INSTRUCTIONS
Our goal in the emergency department is to always give you outstanding care and exceptional service. You may receive a survey by mail or e-mail in the next week regarding your experience in our ED. We would greatly appreciate your completing and returning the survey. Your feedback provides us with a way to recognize our staff who give very good care and it helps us learn how to improve when your experience was below our aspiration of excellence.    Return to the ED for worsening in any way including redness spreading up your leg, fevers, chills or any other problems.  Change your dressing daily.

## 2018-12-28 ENCOUNTER — HOSPITAL ENCOUNTER (EMERGENCY)
Facility: HOSPITAL | Age: 43
Discharge: HOME | End: 2018-12-28
Attending: EMERGENCY MEDICINE
Payer: MEDICAID

## 2018-12-28 VITALS
HEIGHT: 70 IN | TEMPERATURE: 99 F | RESPIRATION RATE: 20 BRPM | SYSTOLIC BLOOD PRESSURE: 141 MMHG | BODY MASS INDEX: 26.48 KG/M2 | WEIGHT: 185 LBS | OXYGEN SATURATION: 96 % | DIASTOLIC BLOOD PRESSURE: 93 MMHG | HEART RATE: 100 BPM

## 2018-12-28 DIAGNOSIS — R45.1 AGITATION: ICD-10-CM

## 2018-12-28 DIAGNOSIS — R41.82 ALTERED MENTAL STATUS: ICD-10-CM

## 2018-12-28 DIAGNOSIS — F10.921 ALCOHOL INTOXICATION WITH DELIRIUM: Primary | ICD-10-CM

## 2018-12-28 LAB
AMPHET+METHAMPHET UR QL: NEGATIVE
ANION GAP SERPL CALC-SCNC: 17 MMOL/L
BACTERIA #/AREA URNS AUTO: ABNORMAL /HPF
BARBITURATES UR QL SCN>200 NG/ML: NEGATIVE
BASOPHILS # BLD AUTO: 0.18 K/UL
BASOPHILS NFR BLD: 1.4 %
BENZODIAZ UR QL SCN>200 NG/ML: NEGATIVE
BILIRUB UR QL STRIP: NEGATIVE
BUN SERPL-MCNC: 11 MG/DL
BZE UR QL SCN: NORMAL
CALCIUM SERPL-MCNC: 8.6 MG/DL
CANNABINOIDS UR QL SCN: NEGATIVE
CHLORIDE SERPL-SCNC: 105 MMOL/L
CLARITY UR REFRACT.AUTO: CLEAR
CO2 SERPL-SCNC: 22 MMOL/L
COLOR UR AUTO: YELLOW
CREAT SERPL-MCNC: 1 MG/DL
CREAT UR-MCNC: 190 MG/DL
DIFFERENTIAL METHOD: ABNORMAL
EOSINOPHIL # BLD AUTO: 0.4 K/UL
EOSINOPHIL NFR BLD: 3 %
ERYTHROCYTE [DISTWIDTH] IN BLOOD BY AUTOMATED COUNT: 12.2 %
EST. GFR  (AFRICAN AMERICAN): >60 ML/MIN/1.73 M^2
EST. GFR  (NON AFRICAN AMERICAN): >60 ML/MIN/1.73 M^2
ETHANOL SERPL-MCNC: 386 MG/DL
GLUCOSE SERPL-MCNC: 102 MG/DL
GLUCOSE UR QL STRIP: NEGATIVE
HCT VFR BLD AUTO: 49.3 %
HGB BLD-MCNC: 15.8 G/DL
HGB UR QL STRIP: ABNORMAL
HYALINE CASTS UR QL AUTO: 6 /LPF
IMM GRANULOCYTES # BLD AUTO: 0.04 K/UL
IMM GRANULOCYTES NFR BLD AUTO: 0.3 %
KETONES UR QL STRIP: NEGATIVE
LEUKOCYTE ESTERASE UR QL STRIP: NEGATIVE
LYMPHOCYTES # BLD AUTO: 4.8 K/UL
LYMPHOCYTES NFR BLD: 37.8 %
MCH RBC QN AUTO: 28.7 PG
MCHC RBC AUTO-ENTMCNC: 32 G/DL
MCV RBC AUTO: 90 FL
METHADONE UR QL SCN>300 NG/ML: NEGATIVE
MICROSCOPIC COMMENT: ABNORMAL
MONOCYTES # BLD AUTO: 1 K/UL
MONOCYTES NFR BLD: 7.6 %
NEUTROPHILS # BLD AUTO: 6.4 K/UL
NEUTROPHILS NFR BLD: 49.9 %
NITRITE UR QL STRIP: NEGATIVE
NRBC BLD-RTO: 0 /100 WBC
OPIATES UR QL SCN: NEGATIVE
PCP UR QL SCN>25 NG/ML: NEGATIVE
PH UR STRIP: 6 [PH] (ref 5–8)
PLATELET # BLD AUTO: 357 K/UL
PMV BLD AUTO: 9.9 FL
POTASSIUM SERPL-SCNC: 3.5 MMOL/L
PROT UR QL STRIP: ABNORMAL
RBC # BLD AUTO: 5.5 M/UL
RBC #/AREA URNS AUTO: 4 /HPF (ref 0–4)
SODIUM SERPL-SCNC: 144 MMOL/L
SP GR UR STRIP: 1.01 (ref 1–1.03)
TOXICOLOGY INFORMATION: NORMAL
URN SPEC COLLECT METH UR: ABNORMAL
WBC # BLD AUTO: 12.82 K/UL
WBC #/AREA URNS AUTO: 1 /HPF (ref 0–5)

## 2018-12-28 PROCEDURE — 99285 PR EMERGENCY DEPT VISIT,LEVEL V: ICD-10-PCS | Mod: ,,, | Performed by: EMERGENCY MEDICINE

## 2018-12-28 PROCEDURE — 99284 EMERGENCY DEPT VISIT MOD MDM: CPT | Mod: 25

## 2018-12-28 PROCEDURE — 80048 BASIC METABOLIC PNL TOTAL CA: CPT

## 2018-12-28 PROCEDURE — 96372 THER/PROPH/DIAG INJ SC/IM: CPT

## 2018-12-28 PROCEDURE — 85025 COMPLETE CBC W/AUTO DIFF WBC: CPT

## 2018-12-28 PROCEDURE — P9612 CATHETERIZE FOR URINE SPEC: HCPCS

## 2018-12-28 PROCEDURE — 80307 DRUG TEST PRSMV CHEM ANLYZR: CPT

## 2018-12-28 PROCEDURE — 93010 ELECTROCARDIOGRAM REPORT: CPT | Mod: ,,, | Performed by: INTERNAL MEDICINE

## 2018-12-28 PROCEDURE — 25000003 PHARM REV CODE 250: Performed by: EMERGENCY MEDICINE

## 2018-12-28 PROCEDURE — 93005 ELECTROCARDIOGRAM TRACING: CPT

## 2018-12-28 PROCEDURE — 99285 EMERGENCY DEPT VISIT HI MDM: CPT | Mod: ,,, | Performed by: EMERGENCY MEDICINE

## 2018-12-28 PROCEDURE — 80320 DRUG SCREEN QUANTALCOHOLS: CPT

## 2018-12-28 PROCEDURE — 93010 EKG 12-LEAD: ICD-10-PCS | Mod: ,,, | Performed by: INTERNAL MEDICINE

## 2018-12-28 PROCEDURE — 81001 URINALYSIS AUTO W/SCOPE: CPT

## 2018-12-28 PROCEDURE — 63600175 PHARM REV CODE 636 W HCPCS: Performed by: INTERNAL MEDICINE

## 2018-12-28 RX ORDER — HALOPERIDOL 5 MG/ML
5 INJECTION INTRAMUSCULAR ONCE
Status: COMPLETED | OUTPATIENT
Start: 2018-12-28 | End: 2018-12-28

## 2018-12-28 RX ORDER — LORAZEPAM 1 MG/1
1 TABLET ORAL
Status: COMPLETED | OUTPATIENT
Start: 2018-12-28 | End: 2018-12-28

## 2018-12-28 RX ORDER — DIPHENHYDRAMINE HYDROCHLORIDE 50 MG/ML
50 INJECTION INTRAMUSCULAR; INTRAVENOUS EVERY 4 HOURS PRN
Status: DISCONTINUED | OUTPATIENT
Start: 2018-12-28 | End: 2018-12-29 | Stop reason: HOSPADM

## 2018-12-28 RX ORDER — KETAMINE HYDROCHLORIDE 100 MG/ML
5 INJECTION, SOLUTION INTRAMUSCULAR; INTRAVENOUS
Status: COMPLETED | OUTPATIENT
Start: 2018-12-28 | End: 2018-12-28

## 2018-12-28 RX ORDER — LORAZEPAM 2 MG/ML
2 INJECTION INTRAMUSCULAR EVERY 4 HOURS PRN
Status: DISCONTINUED | OUTPATIENT
Start: 2018-12-28 | End: 2018-12-28

## 2018-12-28 RX ORDER — HALOPERIDOL 5 MG/ML
5 INJECTION INTRAMUSCULAR EVERY 4 HOURS PRN
Status: DISCONTINUED | OUTPATIENT
Start: 2018-12-28 | End: 2018-12-29 | Stop reason: HOSPADM

## 2018-12-28 RX ADMIN — HALOPERIDOL LACTATE 5 MG: 5 INJECTION, SOLUTION INTRAMUSCULAR at 03:12

## 2018-12-28 RX ADMIN — KETAMINE HYDROCHLORIDE 420 MG: 100 INJECTION INTRAMUSCULAR; INTRAVENOUS at 10:12

## 2018-12-28 RX ADMIN — LORAZEPAM 1 MG: 1 TABLET ORAL at 06:12

## 2018-12-28 NOTE — ED NOTES
Reginald Phoenix is maintaining eye contact on pt and is charting on every 15 min observation form

## 2018-12-28 NOTE — ED NOTES
Unable to obtain history on medication  Pt given Fentanyl on arrival to ED Pt is not awake and able to speak at present

## 2018-12-28 NOTE — ED PROVIDER NOTES
"Encounter Date: 12/28/2018       History     Chief Complaint   Patient presents with    Altered Mental Status     EMS called by [facility] after pt was dropped off there. Pt is combative. Pt refuses to answer questions.      HPI   Mr. Clemente is a 42 y/o male w/ h/o alcohol abuse per chart, here today w/ altered mental status. Brought in by EMS. They states he was picked up outside of [place] where he was dropped off. He is unable to provide history due to AMS. On arrival, he is yelling at nursing staff stating he wants to fight them and yelling obscenities.       Review of patient's allergies indicates:  No Known Allergies  Past Medical History:   Diagnosis Date    Alcohol abuse     Depression     History of psychiatric hospitalization     Hx of psychiatric care     Psychiatric exam requested by authority     Psychiatric problem     Suicide attempt     Therapy     Withdrawal symptoms, alcohol      No past surgical history on file.  Family History   Problem Relation Age of Onset    No Known Problems Mother     No Known Problems Father     Alcohol abuse Paternal Grandmother      Social History     Tobacco Use    Smoking status: Current Every Day Smoker     Packs/day: 1.00     Years: 30.00     Pack years: 30.00     Types: Cigarettes    Smokeless tobacco: Never Used   Substance Use Topics    Alcohol use: Yes     Comment: drinkl " alot everyday- wine beer whatever I get my hands on"    Drug use: Yes     Types: Cocaine, Marijuana     Comment: "In the past, but not recently"     Review of Systems   Unable to perform ROS: Psychiatric disorder       Physical Exam     Initial Vitals   BP Pulse Resp Temp SpO2   12/28/18 1019 12/28/18 1019 12/28/18 1019 12/28/18 1336 12/28/18 1019   132/86 98 18 98.6 °F (37 °C) 100 %      MAP       --                Physical Exam    Nursing note and vitals reviewed.  Constitutional: He appears well-developed and well-nourished. He is not diaphoretic. He appears distressed. "   HENT:   Head: Normocephalic and atraumatic.   Right Ear: External ear normal.   Left Ear: External ear normal.   Nose: Nose normal.   Mouth/Throat: Oropharynx is clear and moist.   Eyes: Conjunctivae are normal. No scleral icterus.   Neck: Normal range of motion.   Cardiovascular: Regular rhythm, normal heart sounds and intact distal pulses. Tachycardia present.    Pulmonary/Chest: Breath sounds normal. No respiratory distress. He has no wheezes. He has no rhonchi. He has no rales.   Abdominal: Soft. He exhibits no distension. There is no tenderness.   Musculoskeletal: He exhibits no edema.   Neurological: He is alert. He is disoriented. GCS eye subscore is 4. GCS verbal subscore is 4. GCS motor subscore is 6.   Moves all extremities spontaneously.   Skin: Skin is warm. Capillary refill takes less than 2 seconds.   Psychiatric: His affect is angry, labile and inappropriate. His speech is slurred. He is agitated, aggressive and hyperactive.         ED Course   Procedures  Labs Reviewed   CBC W/ AUTO DIFFERENTIAL - Abnormal; Notable for the following components:       Result Value    WBC 12.82 (*)     Platelets 357 (*)     All other components within normal limits   BASIC METABOLIC PANEL - Abnormal; Notable for the following components:    CO2 22 (*)     Calcium 8.6 (*)     Anion Gap 17 (*)     All other components within normal limits   URINALYSIS, REFLEX TO URINE CULTURE - Abnormal; Notable for the following components:    Protein, UA 1+ (*)     Occult Blood UA 1+ (*)     All other components within normal limits    Narrative:     Preferred Collection Type->Urine, Clean Catch   ALCOHOL,MEDICAL (ETHANOL) - Abnormal; Notable for the following components:    Alcohol, Medical, Serum 386 (*)     All other components within normal limits   URINALYSIS MICROSCOPIC - Abnormal; Notable for the following components:    Hyaline Casts, UA 6 (*)     All other components within normal limits    Narrative:     Preferred  Collection Type->Urine, Clean Catch   DRUG SCREEN PANEL, URINE EMERGENCY    Narrative:     Preferred Collection Type->Urine, Clean Catch     EKG Readings: (Independently Interpreted)   Sinus tachycardia rate 129. Normal intervals. No ischemic changes. No STEMI.       Imaging Results    None          Medical Decision Making:   Clinical Tests:   Lab Tests: Ordered and Reviewed  Medical Tests: Ordered and Reviewed  ED Management:  Tachycardic. Mildly hypertensive. Here violent, and aggressive. Has many previous visits for polysubstance and alcohol abuse. Threatening staff and physically violent. Suspect this is due to substance intoxication. Given dangerous threat to self and staff, PEC placed and IM ketamine 5 mg/kg given. Violent restraints placed.     Labs reviewed; significant for EtOH 386, UDS + cocaine, mild leukocytosis.     After several hours, patient still altered, requiring leather restraints. Not following commands.    Patient signed out to Dr. Vizcarra to monitor in ED to sobriety. Suspect he will eventually be discharged.  Other:   I have discussed this case with another health care provider.                      Clinical Impression:   The primary encounter diagnosis was Alcohol intoxication with delirium. A diagnosis of Altered mental status was also pertinent to this visit.                             Syed Tanner MD  12/28/18 4267

## 2018-12-28 NOTE — ED NOTES
Pt identifiers Gilmar Clemente were checked and are correct  LOC: pt given Ketamine, Asleep   APPEARANCE: Pt resting comfortably, in no acute distress, pt is clean and well groomed, clothing properly fastened  SKIN: Skin warm, dry and intact, normal skin turgor, moist mucus membranes  RESPIRATORY: Breath sounds clear tatiana to all lung fields on auscultation  Pt snoring Resp irregular   CARDIAC: Normal rate and rhythm, no peripheral edema noted, capillary refill < 3 seconds, bilateral radial pulses 2+  ABDOMEN: Soft, nontender, nondistended. Bowel sounds present to all four quad abd on auscultation  NEUROLOGIC: PERRL, facial expression is symmetrical,   MUSCULOSKELETAL: No obvious deformities.

## 2018-12-28 NOTE — ED NOTES
Upon entering ER, patient reaching at other patients, attempting to hit staff and [facility]. Dr. Tanner at bedside. Patient placed in leather restraints

## 2018-12-28 NOTE — ED NOTES
Myra bonner is at bedside and is charting on every 15 min observation form Reginald does not have personal belongings  with her

## 2018-12-29 NOTE — ED NOTES
ED Progress Note:    Patient turned over me by off going provider.  Please see his note for complete documentation.  Reviewed labs which are unremarkable other than his alcohol level.  Evaluated at bedside.  Patient is agitated.  Given 5 mg of Haldol.  Several hours later, he is vastly improved.  Re-evaluated at bedside.  Clinically sober and complaining of tremors from alcohol withdrawal.  He says he drinks to excess daily.  No SI or HI.  Given oral ativan.  He denies n/v/d/f/cough/sob/cp/abd pain/dysuria.  He declined resources for etoh rehab.  No julianne/agitation/psychosis.  Doubt he is a danger to himself or others.  No indication for PEC or CEC.  Will discharge with instructions for close outpatient follow up.  Patient will return to ED for worsening symptoms, inability to eat/drink, fever greater than 100.4, or any other concerns.  Did bedside teaching with return precautions.  All questions answered.  The patient acknowledges understanding.  Gave verbal discharge instructions.     Daniel Vizcarra MD  12/28/18 3700

## 2018-12-29 NOTE — DISCHARGE INSTRUCTIONS
Our goal in the emergency department is to always give you outstanding care and exceptional service. You may receive a survey by mail or e-mail in the next week regarding your experience in our ED. We would greatly appreciate your completing and returning the survey. Your feedback provides us with a way to recognize our staff who give very good care and it helps us learn how to improve when your experience was below our aspiration of excellence.

## 2019-07-16 ENCOUNTER — HOSPITAL ENCOUNTER (EMERGENCY)
Facility: HOSPITAL | Age: 44
Discharge: HOME | End: 2019-07-16
Attending: EMERGENCY MEDICINE
Payer: MEDICAID

## 2019-07-16 VITALS
SYSTOLIC BLOOD PRESSURE: 170 MMHG | TEMPERATURE: 98 F | BODY MASS INDEX: 27.02 KG/M2 | RESPIRATION RATE: 18 BRPM | OXYGEN SATURATION: 98 % | WEIGHT: 193 LBS | HEIGHT: 71 IN | DIASTOLIC BLOOD PRESSURE: 87 MMHG | HEART RATE: 94 BPM

## 2019-07-16 DIAGNOSIS — M25.511 CHRONIC RIGHT SHOULDER PAIN: Primary | ICD-10-CM

## 2019-07-16 DIAGNOSIS — M25.519 SHOULDER PAIN: ICD-10-CM

## 2019-07-16 DIAGNOSIS — G89.29 CHRONIC RIGHT SHOULDER PAIN: Primary | ICD-10-CM

## 2019-07-16 PROCEDURE — 25000003 PHARM REV CODE 250: Performed by: PHYSICIAN ASSISTANT

## 2019-07-16 PROCEDURE — 93010 EKG 12-LEAD: ICD-10-PCS | Mod: ,,, | Performed by: INTERNAL MEDICINE

## 2019-07-16 PROCEDURE — 99284 EMERGENCY DEPT VISIT MOD MDM: CPT | Mod: ,,, | Performed by: EMERGENCY MEDICINE

## 2019-07-16 PROCEDURE — 99284 PR EMERGENCY DEPT VISIT,LEVEL IV: ICD-10-PCS | Mod: ,,, | Performed by: EMERGENCY MEDICINE

## 2019-07-16 PROCEDURE — 93005 ELECTROCARDIOGRAM TRACING: CPT

## 2019-07-16 PROCEDURE — 93010 ELECTROCARDIOGRAM REPORT: CPT | Mod: ,,, | Performed by: INTERNAL MEDICINE

## 2019-07-16 PROCEDURE — 99283 EMERGENCY DEPT VISIT LOW MDM: CPT | Mod: 25

## 2019-07-16 RX ORDER — NAPROXEN 500 MG/1
500 TABLET ORAL 2 TIMES DAILY WITH MEALS
Qty: 20 TABLET | Refills: 0 | Status: SHIPPED | OUTPATIENT
Start: 2019-07-16 | End: 2020-09-09 | Stop reason: CLARIF

## 2019-07-16 RX ORDER — NAPROXEN 500 MG/1
500 TABLET ORAL
Status: COMPLETED | OUTPATIENT
Start: 2019-07-16 | End: 2019-07-16

## 2019-07-16 RX ADMIN — NAPROXEN 500 MG: 500 TABLET ORAL at 09:07

## 2019-07-16 NOTE — ED PROVIDER NOTES
Encounter Date: 7/16/2019       History     Chief Complaint   Patient presents with    Shoulder Pain     PT reports right shoulder pain when he lifts it up for a few months. PT denies injury to the right shoulder. No deformity noted to right shoulder. PT reports pain worsened today and awoke him in the middle of the night.      8:54 AM  Patient is a 43-year-old male with a history psychiatric problem, depression, alcohol abuse who presents the ED with chronic right shoulder pain patient.  Patient states that he has had pain for years.  He denies any previous injury or trauma.  He is right-handed and works on a boat.  He states that yesterday, his pain was severe which prompted him to come into the emergency department today.  Although right now, his pain has improved to a 3/10 on the pain scale.  He states that he has intermittent paresthesias down his right upper arm to his 5th digit intermittently when he does certain activities.  His pain is most noticeable when he lift heavy objects up suddenly.  His pain is improved after weeks of resting and his arm will go back to normal.  He endorses some radiating pain across his chest when his right shoulder is hurting.  He denies any other symptoms and has no other complaints at this time.  He does not take anything for pain relief.  Although he asked if NSAIDs are similar to Neurontin and believes he has been prescribed Neurontin for his symptoms before.          Review of patient's allergies indicates:  No Known Allergies  Past Medical History:   Diagnosis Date    Alcohol abuse     Depression     History of psychiatric hospitalization     Hx of psychiatric care     Psychiatric exam requested by authority     Psychiatric problem     Suicide attempt     Therapy     Withdrawal symptoms, alcohol      No past surgical history on file.  Family History   Problem Relation Age of Onset    No Known Problems Mother     No Known Problems Father     Alcohol abuse  "Paternal Grandmother      Social History     Tobacco Use    Smoking status: Current Every Day Smoker     Packs/day: 1.00     Years: 30.00     Pack years: 30.00     Types: Cigarettes    Smokeless tobacco: Never Used   Substance Use Topics    Alcohol use: Yes     Comment: drinkl " alot everyday- wine beer whatever I get my hands on"    Drug use: Yes     Types: Cocaine, Marijuana     Comment: "In the past, but not recently"     Review of Systems   Constitutional: Negative for chills and fever.   HENT: Negative for sore throat.    Eyes: Negative for photophobia.   Respiratory: Negative for cough and shortness of breath.    Cardiovascular: Negative for leg swelling.   Gastrointestinal: Negative for nausea.   Endocrine: Negative for polyphagia.   Genitourinary: Negative for dysuria and hematuria.   Musculoskeletal: Positive for arthralgias. Negative for back pain.   Skin: Negative for rash.   Neurological: Positive for numbness.   Hematological: Does not bruise/bleed easily.       Physical Exam     Initial Vitals [07/16/19 0816]   BP Pulse Resp Temp SpO2   (!) 170/87 94 18 98.2 °F (36.8 °C) 98 %      MAP       --         Physical Exam    Vitals reviewed.  Constitutional: He appears well-developed and well-nourished. He is not diaphoretic. No distress.   HENT:   Head: Normocephalic and atraumatic.   Nose: Nose normal.   Eyes: Conjunctivae and EOM are normal.   Neck: Normal range of motion.   Cardiovascular: Normal rate, regular rhythm and normal heart sounds. Exam reveals no friction rub.    No murmur heard.  Pulses:       Radial pulses are 2+ on the right side, and 2+ on the left side.   Pulmonary/Chest: Breath sounds normal. No respiratory distress. He has no wheezes. He has no rales.   Abdominal: Soft. Bowel sounds are normal. He exhibits no distension. There is no tenderness.   Musculoskeletal: Normal range of motion.        Right shoulder: He exhibits normal range of motion, no tenderness, no bony tenderness, no " swelling, no effusion, no crepitus, no deformity, no spasm and normal strength.        Cervical back: Normal.   Intact ROM and strength of R shoulder and symmetric.   Neurological: He is alert and oriented to person, place, and time. He has normal strength. No sensory deficit.   Skin: Skin is warm and dry. No erythema.   Psychiatric: He has a normal mood and affect. Thought content normal.         ED Course   Procedures  Labs Reviewed - No data to display       Imaging Results    None          Medical Decision Making:   History:   Old Medical Records: I decided to obtain old medical records.  Old Records Summarized: records from another hospital and records from clinic visits.       <> Summary of Records: He has been seen multiple times at outside ED for ETOH use. R shoulder x-ray on 6/18/2019 at outside hospital showed trace contour abnormality of the lateral aspect of the humeral epiphysis. Mild inferior acromioclavicular spurring.   Initial Assessment:   Patient is a 43-year-old male that presents the ED with right shoulder pain for years.  No previous or new injury or trauma.  Differential Diagnosis:   Includes but is not limited to capsulitis, tendinitis, rotator cuff tear, muscle strain, cervical radiculopathy.  Patient with normal right shoulder.  No abnormalities noted on exam.  No focal bony tenderness noted. I doubt fractures or dislocations.  Clinical Tests:   Medical Tests: Reviewed  ED Management:  ECG with NSR at 93 bpm. No STEMI.    Given history and physical exam, his symptoms are most consistent with tendinitis versus cervical radiculopathy given pattern of paresthesias.  I discussed etiology.  He does not need any labs or imaging at this time.  He does not take anything for pain relief.  I will give him naproxen here.  I prescribed naproxen for home.  He is to closely follow up with orthopedist for his chronic pain. All questions were answered.  Patient is comfortable with plan and stable for  discharge.    I have reviewed patient's chart and discussed this case with my supervising MD.     Emma King PA-C  Emergent Department  [facility]  Spectralink #[id number] or #[phone]                Attending Attestation:     Physician Attestation Statement for NP/PA:   I discussed this assessment and plan of this patient with the NP/PA, but I did not personally examine the patient. The face to face encounter was performed by the NP/PA.                     Clinical Impression:       ICD-10-CM ICD-9-CM   1. Chronic right shoulder pain M25.511 719.41    G89.29 338.29   2. Shoulder pain M25.519 719.41         Disposition:   Disposition: Discharged  Condition: Stable                Emma King PA-C  07/16/19 0931       Finesse Carpenter MD  07/16/19 6137

## 2019-07-16 NOTE — DISCHARGE INSTRUCTIONS
Take nonsteroidal anti-inflammatories (naproxen) 2 times a day which is every 12 hr for pain relief.  Call and follow up closely with [facility]oped[facility] at [place] for further evaluation and management.    No future appointments.    Our goal in the emergency department is to always give you outstanding care and exceptional service. You may receive a survey by mail or e-mail in the next week regarding your experience in our ED. We would greatly appreciate your completing and returning the survey. Your feedback provides us with a way to recognize our staff who give very good care and it helps us learn how to improve when your experience was below our aspiration of excellence.

## 2019-07-16 NOTE — ED TRIAGE NOTES
Patient arrives for evaluation of right shoulder pain x several months - patient states he has not been trying any medications - states worse with upward movement of the arm - patient states the pain woke him last night and he had numbness and tingling down his arm into his hand - denies numbness or tingling at present - states sharp pains intermittently down arm

## 2019-09-28 ENCOUNTER — HOSPITAL ENCOUNTER (EMERGENCY)
Facility: HOSPITAL | Age: 44
Discharge: HOME | End: 2019-09-28
Attending: EMERGENCY MEDICINE
Payer: MEDICAID

## 2019-09-28 VITALS
BODY MASS INDEX: 24.5 KG/M2 | SYSTOLIC BLOOD PRESSURE: 135 MMHG | TEMPERATURE: 98 F | OXYGEN SATURATION: 99 % | HEIGHT: 71 IN | DIASTOLIC BLOOD PRESSURE: 86 MMHG | WEIGHT: 175 LBS | RESPIRATION RATE: 16 BRPM | HEART RATE: 75 BPM

## 2019-09-28 DIAGNOSIS — M25.519 SHOULDER PAIN: ICD-10-CM

## 2019-09-28 DIAGNOSIS — S40.012A CONTUSION OF LEFT SHOULDER, INITIAL ENCOUNTER: Primary | ICD-10-CM

## 2019-09-28 PROCEDURE — 99284 EMERGENCY DEPT VISIT MOD MDM: CPT | Mod: ,,, | Performed by: EMERGENCY MEDICINE

## 2019-09-28 PROCEDURE — 99284 PR EMERGENCY DEPT VISIT,LEVEL IV: ICD-10-PCS | Mod: ,,, | Performed by: EMERGENCY MEDICINE

## 2019-09-28 PROCEDURE — 99283 EMERGENCY DEPT VISIT LOW MDM: CPT | Mod: 25

## 2019-09-28 RX ORDER — IBUPROFEN 600 MG/1
600 TABLET ORAL EVERY 6 HOURS PRN
Qty: 20 TABLET | Refills: 0 | Status: ON HOLD | OUTPATIENT
Start: 2019-09-28 | End: 2019-10-02 | Stop reason: HOSPADM

## 2019-09-28 NOTE — ED NOTES
Patient identifiers verified and correct for Gilmar Clemente.    LOC: The patient is awake, alert and oriented x 4. Pt is speaking appropriately, no slurred speech.  APPEARANCE: Patient resting comfortably and in no acute distress. Pt is clean and well groomed. No JVD visible. Pt reports pain level of 10/10.  SKIN: Skin is warm dry and intact, and color is consistent with ethnicity. No tenting observed and capillary refill <3 seconds. No clubbing noted to nail beds. No breakdown or brusing visible and mucus membranes moist and acyanotic.  MUSCULOSKELETAL: left arm limited ROM.   RESPIRATORY: Airway is open and patent. Respirations-unlabored, regular rate, equal bilaterally on inspiration and expiration. No accessory muscle use noted. Lungs clear to auscultation in all fields bilaterally anterior and posterior.   CARDIAC: Patient has regular heart rate and rhythm.  No peripheral edema noted, and patient has no c/o chest pain.  ABDOMEN: Soft and non-tender to palpation with no distention noted. Normoactive bowel sounds X4 quadrants. Pt has no complaints of abnormal bowel movements. Pt reports normal appetite.   NEUROLOGIC: Eyes open spontaneously and facial expression symmetrical. Pt behavior appropriate to situation, and pt follows commands.  Pt reports sensation present in all extremities when touched with a finger. PERRLA  : No complaints of frequency, burning, urgency or blood in the urine.
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
## 2019-09-30 ENCOUNTER — HOSPITAL ENCOUNTER (OUTPATIENT)
Facility: HOSPITAL | Age: 44
Discharge: HOME | DRG: 897 | End: 2019-10-02
Attending: EMERGENCY MEDICINE | Admitting: INTERNAL MEDICINE
Payer: MEDICAID

## 2019-09-30 DIAGNOSIS — R07.9 CHEST PAIN: ICD-10-CM

## 2019-09-30 DIAGNOSIS — F10.939 ALCOHOL WITHDRAWAL: Primary | ICD-10-CM

## 2019-09-30 LAB
ALBUMIN SERPL BCP-MCNC: 4.1 G/DL (ref 3.5–5.2)
ALP SERPL-CCNC: 126 U/L (ref 55–135)
ALT SERPL W/O P-5'-P-CCNC: 43 U/L (ref 10–44)
AMMONIA PLAS-SCNC: 62 UMOL/L (ref 10–50)
AMPHET+METHAMPHET UR QL: NEGATIVE
ANION GAP SERPL CALC-SCNC: 18 MMOL/L (ref 8–16)
APTT BLDCRRT: 23.2 SEC (ref 21–32)
AST SERPL-CCNC: 45 U/L (ref 10–40)
BACTERIA #/AREA URNS AUTO: ABNORMAL /HPF
BARBITURATES UR QL SCN>200 NG/ML: NEGATIVE
BASOPHILS # BLD AUTO: 0.14 K/UL (ref 0–0.2)
BASOPHILS NFR BLD: 1.6 % (ref 0–1.9)
BENZODIAZ UR QL SCN>200 NG/ML: NEGATIVE
BILIRUB SERPL-MCNC: 0.5 MG/DL (ref 0.1–1)
BILIRUB UR QL STRIP: NEGATIVE
BUN SERPL-MCNC: 14 MG/DL (ref 6–20)
BZE UR QL SCN: NORMAL
CALCIUM SERPL-MCNC: 8.5 MG/DL (ref 8.7–10.5)
CANNABINOIDS UR QL SCN: NEGATIVE
CHLORIDE SERPL-SCNC: 104 MMOL/L (ref 95–110)
CLARITY UR REFRACT.AUTO: ABNORMAL
CO2 SERPL-SCNC: 19 MMOL/L (ref 23–29)
COLOR UR AUTO: YELLOW
CREAT SERPL-MCNC: 0.8 MG/DL (ref 0.5–1.4)
CREAT UR-MCNC: 286 MG/DL (ref 23–375)
DIFFERENTIAL METHOD: NORMAL
EOSINOPHIL # BLD AUTO: 0.3 K/UL (ref 0–0.5)
EOSINOPHIL NFR BLD: 3.8 % (ref 0–8)
ERYTHROCYTE [DISTWIDTH] IN BLOOD BY AUTOMATED COUNT: 12.5 % (ref 11.5–14.5)
EST. GFR  (AFRICAN AMERICAN): >60 ML/MIN/1.73 M^2
EST. GFR  (NON AFRICAN AMERICAN): >60 ML/MIN/1.73 M^2
ESTIMATED AVG GLUCOSE: 108 MG/DL (ref 68–131)
GLUCOSE SERPL-MCNC: 80 MG/DL (ref 70–110)
GLUCOSE UR QL STRIP: NEGATIVE
GRAN CASTS UR QL COMP ASSIST: 4 /LPF
HBA1C MFR BLD HPLC: 5.4 % (ref 4–5.6)
HCT VFR BLD AUTO: 45.7 % (ref 40–54)
HGB BLD-MCNC: 15.9 G/DL (ref 14–18)
HGB UR QL STRIP: ABNORMAL
HYALINE CASTS UR QL AUTO: 4 /LPF
IMM GRANULOCYTES # BLD AUTO: 0.03 K/UL (ref 0–0.04)
IMM GRANULOCYTES NFR BLD AUTO: 0.3 % (ref 0–0.5)
INR PPP: 0.9 (ref 0.8–1.2)
KETONES UR QL STRIP: ABNORMAL
LEUKOCYTE ESTERASE UR QL STRIP: NEGATIVE
LYMPHOCYTES # BLD AUTO: 2.7 K/UL (ref 1–4.8)
LYMPHOCYTES NFR BLD: 30 % (ref 18–48)
MAGNESIUM SERPL-MCNC: 2 MG/DL (ref 1.6–2.6)
MCH RBC QN AUTO: 29.2 PG (ref 27–31)
MCHC RBC AUTO-ENTMCNC: 34.8 G/DL (ref 32–36)
MCV RBC AUTO: 84 FL (ref 82–98)
METHADONE UR QL SCN>300 NG/ML: NEGATIVE
MICROSCOPIC COMMENT: ABNORMAL
MONOCYTES # BLD AUTO: 0.8 K/UL (ref 0.3–1)
MONOCYTES NFR BLD: 8.9 % (ref 4–15)
NEUTROPHILS # BLD AUTO: 5 K/UL (ref 1.8–7.7)
NEUTROPHILS NFR BLD: 55.4 % (ref 38–73)
NITRITE UR QL STRIP: NEGATIVE
NRBC BLD-RTO: 0 /100 WBC
OPIATES UR QL SCN: NEGATIVE
PCP UR QL SCN>25 NG/ML: NEGATIVE
PH UR STRIP: 6 [PH] (ref 5–8)
PLATELET # BLD AUTO: 285 K/UL (ref 150–350)
PMV BLD AUTO: 10.5 FL (ref 9.2–12.9)
POCT GLUCOSE: 95 MG/DL (ref 70–110)
POTASSIUM SERPL-SCNC: 4 MMOL/L (ref 3.5–5.1)
PROT SERPL-MCNC: 7.6 G/DL (ref 6–8.4)
PROT UR QL STRIP: ABNORMAL
PROTHROMBIN TIME: 9.6 SEC (ref 9–12.5)
RBC # BLD AUTO: 5.45 M/UL (ref 4.6–6.2)
RBC #/AREA URNS AUTO: 0 /HPF (ref 0–4)
SODIUM SERPL-SCNC: 141 MMOL/L (ref 136–145)
SP GR UR STRIP: 1.03 (ref 1–1.03)
TOXICOLOGY INFORMATION: NORMAL
TROPONIN I SERPL DL<=0.01 NG/ML-MCNC: <0.006 NG/ML (ref 0–0.03)
URN SPEC COLLECT METH UR: ABNORMAL
WBC # BLD AUTO: 8.99 K/UL (ref 3.9–12.7)
WBC #/AREA URNS AUTO: 2 /HPF (ref 0–5)

## 2019-09-30 PROCEDURE — 85025 COMPLETE CBC W/AUTO DIFF WBC: CPT

## 2019-09-30 PROCEDURE — 63600175 PHARM REV CODE 636 W HCPCS: Performed by: PHYSICIAN ASSISTANT

## 2019-09-30 PROCEDURE — 63600175 PHARM REV CODE 636 W HCPCS: Performed by: INTERNAL MEDICINE

## 2019-09-30 PROCEDURE — 25000003 PHARM REV CODE 250: Performed by: INTERNAL MEDICINE

## 2019-09-30 PROCEDURE — 84484 ASSAY OF TROPONIN QUANT: CPT

## 2019-09-30 PROCEDURE — 99284 PR EMERGENCY DEPT VISIT,LEVEL IV: ICD-10-PCS | Mod: ,,, | Performed by: EMERGENCY MEDICINE

## 2019-09-30 PROCEDURE — 85610 PROTHROMBIN TIME: CPT

## 2019-09-30 PROCEDURE — 25000003 PHARM REV CODE 250: Performed by: PHYSICIAN ASSISTANT

## 2019-09-30 PROCEDURE — 99284 EMERGENCY DEPT VISIT MOD MDM: CPT | Mod: ,,, | Performed by: EMERGENCY MEDICINE

## 2019-09-30 PROCEDURE — G0378 HOSPITAL OBSERVATION PER HR: HCPCS

## 2019-09-30 PROCEDURE — 96374 THER/PROPH/DIAG INJ IV PUSH: CPT

## 2019-09-30 PROCEDURE — 36415 COLL VENOUS BLD VENIPUNCTURE: CPT

## 2019-09-30 PROCEDURE — 11000001 HC ACUTE MED/SURG PRIVATE ROOM

## 2019-09-30 PROCEDURE — 99285 EMERGENCY DEPT VISIT HI MDM: CPT | Mod: 25

## 2019-09-30 PROCEDURE — 85730 THROMBOPLASTIN TIME PARTIAL: CPT

## 2019-09-30 PROCEDURE — 83735 ASSAY OF MAGNESIUM: CPT

## 2019-09-30 PROCEDURE — 83036 HEMOGLOBIN GLYCOSYLATED A1C: CPT

## 2019-09-30 PROCEDURE — 99222 1ST HOSP IP/OBS MODERATE 55: CPT | Mod: ,,, | Performed by: INTERNAL MEDICINE

## 2019-09-30 PROCEDURE — 80053 COMPREHEN METABOLIC PANEL: CPT

## 2019-09-30 PROCEDURE — 99222 PR INITIAL HOSPITAL CARE,LEVL II: ICD-10-PCS | Mod: ,,, | Performed by: INTERNAL MEDICINE

## 2019-09-30 PROCEDURE — 80307 DRUG TEST PRSMV CHEM ANLYZR: CPT

## 2019-09-30 PROCEDURE — 82140 ASSAY OF AMMONIA: CPT

## 2019-09-30 PROCEDURE — 81001 URINALYSIS AUTO W/SCOPE: CPT

## 2019-09-30 RX ORDER — ACETAMINOPHEN 325 MG/1
650 TABLET ORAL EVERY 4 HOURS PRN
Status: DISCONTINUED | OUTPATIENT
Start: 2019-09-30 | End: 2019-10-02 | Stop reason: HOSPADM

## 2019-09-30 RX ORDER — ROCURONIUM BROMIDE 10 MG/ML
INJECTION, SOLUTION INTRAVENOUS
Status: DISPENSED
Start: 2019-09-30 | End: 2019-09-30

## 2019-09-30 RX ORDER — DIAZEPAM 5 MG/1
5 TABLET ORAL
Status: COMPLETED | OUTPATIENT
Start: 2019-09-30 | End: 2019-09-30

## 2019-09-30 RX ORDER — DIAZEPAM 10 MG/2ML
10 INJECTION INTRAMUSCULAR
Status: COMPLETED | OUTPATIENT
Start: 2019-09-30 | End: 2019-09-30

## 2019-09-30 RX ORDER — FOLIC ACID 1 MG/1
1 TABLET ORAL DAILY
Status: DISCONTINUED | OUTPATIENT
Start: 2019-09-30 | End: 2019-10-02 | Stop reason: HOSPADM

## 2019-09-30 RX ORDER — DIAZEPAM 5 MG/1
10 TABLET ORAL EVERY 6 HOURS PRN
Status: DISCONTINUED | OUTPATIENT
Start: 2019-09-30 | End: 2019-09-30

## 2019-09-30 RX ORDER — GLUCAGON 1 MG
1 KIT INJECTION
Status: DISCONTINUED | OUTPATIENT
Start: 2019-09-30 | End: 2019-10-02 | Stop reason: HOSPADM

## 2019-09-30 RX ORDER — LORAZEPAM 0.5 MG/1
2 TABLET ORAL EVERY 4 HOURS PRN
Status: DISCONTINUED | OUTPATIENT
Start: 2019-09-30 | End: 2019-10-02 | Stop reason: HOSPADM

## 2019-09-30 RX ORDER — ETOMIDATE 2 MG/ML
INJECTION INTRAVENOUS
Status: DISPENSED
Start: 2019-09-30 | End: 2019-09-30

## 2019-09-30 RX ORDER — LORAZEPAM 0.5 MG/1
2 TABLET ORAL ONCE
Status: DISCONTINUED | OUTPATIENT
Start: 2019-09-30 | End: 2019-09-30

## 2019-09-30 RX ORDER — SODIUM CHLORIDE 0.9 % (FLUSH) 0.9 %
10 SYRINGE (ML) INJECTION
Status: DISCONTINUED | OUTPATIENT
Start: 2019-09-30 | End: 2019-10-02 | Stop reason: HOSPADM

## 2019-09-30 RX ORDER — IBUPROFEN 200 MG
16 TABLET ORAL
Status: DISCONTINUED | OUTPATIENT
Start: 2019-09-30 | End: 2019-10-02 | Stop reason: HOSPADM

## 2019-09-30 RX ORDER — IBUPROFEN 200 MG
24 TABLET ORAL
Status: DISCONTINUED | OUTPATIENT
Start: 2019-09-30 | End: 2019-10-02 | Stop reason: HOSPADM

## 2019-09-30 RX ORDER — SODIUM CHLORIDE 9 MG/ML
INJECTION, SOLUTION INTRAVENOUS CONTINUOUS
Status: DISCONTINUED | OUTPATIENT
Start: 2019-09-30 | End: 2019-10-01

## 2019-09-30 RX ORDER — KETOROLAC TROMETHAMINE 30 MG/ML
10 INJECTION, SOLUTION INTRAMUSCULAR; INTRAVENOUS
Status: COMPLETED | OUTPATIENT
Start: 2019-09-30 | End: 2019-09-30

## 2019-09-30 RX ORDER — IBUPROFEN 200 MG
600 TABLET ORAL EVERY 6 HOURS PRN
Status: DISCONTINUED | OUTPATIENT
Start: 2019-09-30 | End: 2019-10-02 | Stop reason: HOSPADM

## 2019-09-30 RX ORDER — DIAZEPAM 5 MG/1
10 TABLET ORAL EVERY 8 HOURS
Status: DISCONTINUED | OUTPATIENT
Start: 2019-09-30 | End: 2019-10-01

## 2019-09-30 RX ORDER — ONDANSETRON 2 MG/ML
4 INJECTION INTRAMUSCULAR; INTRAVENOUS EVERY 8 HOURS PRN
Status: DISCONTINUED | OUTPATIENT
Start: 2019-09-30 | End: 2019-10-02 | Stop reason: HOSPADM

## 2019-09-30 RX ORDER — THIAMINE HCL 100 MG
100 TABLET ORAL DAILY
Status: DISCONTINUED | OUTPATIENT
Start: 2019-10-01 | End: 2019-10-02 | Stop reason: HOSPADM

## 2019-09-30 RX ORDER — THIAMINE HCL 100 MG
100 TABLET ORAL
Status: COMPLETED | OUTPATIENT
Start: 2019-09-30 | End: 2019-09-30

## 2019-09-30 RX ORDER — DIAZEPAM 5 MG/1
5 TABLET ORAL EVERY 8 HOURS
Status: DISCONTINUED | OUTPATIENT
Start: 2019-09-30 | End: 2019-09-30

## 2019-09-30 RX ADMIN — SODIUM CHLORIDE: 0.9 INJECTION, SOLUTION INTRAVENOUS at 06:09

## 2019-09-30 RX ADMIN — SODIUM CHLORIDE 1000 ML: 0.9 INJECTION, SOLUTION INTRAVENOUS at 08:09

## 2019-09-30 RX ADMIN — DIAZEPAM 10 MG: 5 TABLET ORAL at 09:09

## 2019-09-30 RX ADMIN — DIAZEPAM 10 MG: 5 INJECTION, SOLUTION INTRAMUSCULAR; INTRAVENOUS at 08:09

## 2019-09-30 RX ADMIN — DIAZEPAM 5 MG: 5 TABLET ORAL at 09:09

## 2019-09-30 RX ADMIN — LORAZEPAM 2 MG: 0.5 TABLET ORAL at 07:09

## 2019-09-30 RX ADMIN — THERA TABS 1 TABLET: TAB at 01:09

## 2019-09-30 RX ADMIN — Medication 100 MG: at 08:09

## 2019-09-30 RX ADMIN — FOLIC ACID 1 MG: 1 TABLET ORAL at 01:09

## 2019-09-30 RX ADMIN — DIAZEPAM 10 MG: 5 TABLET ORAL at 01:09

## 2019-09-30 RX ADMIN — KETOROLAC TROMETHAMINE 10 MG: 30 INJECTION, SOLUTION INTRAMUSCULAR; INTRAVENOUS at 09:09

## 2019-09-30 NOTE — ED NOTES
Patient identifiers verified and correct for Gilmar Clemente  LOC: The patient is awake, alert and aware of environment with an appropriate affect, the patient is oriented x 3 and speaking appropriately.   APPEARANCE: Patient appears comfortable and in no acute distress, patient is clean and well groomed.  SKIN: The skin is warm and dry, color consistent with ethnicity, patient has normal skin turgor and moist mucus membranes, skin intact, no breakdown or bruising noted.   MUSCULOSKELETAL: Patient has left shoulder injury, placed in sling  RESPIRATORY: Airway is open and patent, respirations are spontaneous, patient has a normal effort and rate, no accessory muscle use noted, pt placed on continuous pulse ox with O2 sats noted at 100% on room air.  CARDIAC:  Patient has a normal rate and regular rhythm, no edema noted, capillary refill < 3 seconds.   GASTRO: Soft and non tender to palpation, no distention noted, normoactive bowel sounds present in all four quadrants. Pt states bowel movements have been regular.  : Pt reports decrease urine output  NEURO: Pt opens eyes spontaneously, behavior appropriate to situation, follows commands, facial expression symmetrical, bilateral hand grasp equal and even, purposeful motor response noted, normal sensation in all extremities when touched with a finger. Pt has tremors

## 2019-09-30 NOTE — H&P
Hospital Medicine  History and Physical Exam    [facility]: [facility] Art Mena MD  Admit Date: 9/30/2019  MARIE   Principal Problem:  Alcohol withdrawal   Patient information was obtained from patient and ER records.   Primary care Physician: Primary Doctor No  Code status: Full Code    HPI:     Mr. Clemente is a 43 year old gentleman with PMH of alcohol abuse who presents with alcohol withdrawal.  The patient reports that he has not had a drink in over 24 hrs and feels very anxious and has generalized body pain. He states that he has been previously hospitalized for alcohol intoxication/withdrawal but this time it's more severe.  He reports tremulousness, severe anxiety, nausea and confusion.      He states that he cannot remember what happened in the last 2 days since he was seen in the ED for a shoulder injury. He is unsure whether not he has used drugs in the last several days.  He denies seizures, visual disturbances, hallucinations, headache, vomiting or paresthesias.  No history of alcohol withdrawal seizures.    Past Medical History: Patient has a past medical history of Alcohol abuse, Depression, History of psychiatric hospitalization, psychiatric care, Psychiatric exam requested by authority, Psychiatric problem, Suicide attempt, Therapy, and Withdrawal symptoms, alcohol.    Past Surgical History: Patient has no past surgical history on file.    Social History: Patient reports that he has been smoking cigarettes. He has a 30.00 pack-year smoking history. He has never used smokeless tobacco. He reports that he drinks alcohol. He reports that he has current or past drug history. Drugs: Cocaine and Marijuana.    Family History: family history includes Alcohol abuse in his paternal grandmother; No Known Problems in his father and mother.    Medications:   Prior to Admission medications    Medication Sig Start Date End Date Taking? Authorizing Provider   ibuprofen (ADVIL,MOTRIN) 600 MG tablet Take 1 tablet  (600 mg total) by mouth every 6 (six) hours as needed for Pain. 9/28/19   Tomás Medellin,    naproxen (NAPROSYN) 500 MG tablet Take 1 tablet (500 mg total) by mouth 2 (two) times daily with meals. 7/16/19   Emma King PA-C     Scheduled Meds:   diazePAM  10 mg Oral Q8H    etomidate        folic acid  1 mg Oral Daily    multivitamin  1 tablet Oral Daily    rocuronium        [START ON 10/1/2019] thiamine  100 mg Oral Daily     Continuous Infusions:   sodium chloride 0.9% 75 mL/hr at 09/30/19 1803     PRN Meds:.acetaminophen, Dextrose 10% Bolus, Dextrose 10% Bolus, glucagon (human recombinant), glucose, glucose, ibuprofen, LORazepam, ondansetron, sodium chloride 0.9%    Allergies: Patient has No Known Allergies.    ROS    Constitutional: neg for fever or chills    Respiratory: neg for cough or shortness of breath  Cardiovascular: neg for chest pain or palpitations  Gastrointestinal: pos for nausea or neg for vomiting, neg for abdominal pain or change in bowel habits  Genitourinary: neg for hematuria or dysuria  Integument/Breast: neg for rash or pruritis  Hematologic/Lymphatic: neg for easy bruising or lymphadenopathy  Musculoskeletal: pos for arthralgias neg for  myalgias  Neurological: pos for tremors neg for seizures   Behavioral/Psych: pos for anxiety and agitation neg for depression    PEx  Temp:  [97.7 °F (36.5 °C)-98.5 °F (36.9 °C)]   Pulse:  []   Resp:  [18-20]   BP: (137-151)/(83-99)   SpO2:  [97 %-100 %]   Body mass index is 26.5 kg/m².     General appearance: no distress, mild distress  Mental status: Alert and oriented x 3  HEENT:  conjunctivae/corneas clear, PERRL  Neck: supple, thyroid not enlarged  Pulm:   normal respiratory effort, CTA B, no c/w/r  Card: tachycardic, regular rhythm, no murmur  Abd: soft, NT, ND, BS present; no masses, no organomegaly  Ext: tremulous when arms extended. no edema  Pulses: 2+, symmetric  Skin: color, texture, turgor normal. No rashes or  lesions  Neuro: CN II-XII grossly intact, no focal numbness or weakness, normal strength and tone   Psychiatric: anxious and agitated     Recent Labs   Lab 09/30/19  0814   WBC 8.99   HGB 15.9   HCT 45.7        Recent Labs   Lab 09/30/19  0814      K 4.0      CO2 19*   BUN 14   CREATININE 0.8   GLU 80   CALCIUM 8.5*   MG 2.0     Recent Labs   Lab 09/30/19  0814 09/30/19  1329   ALKPHOS 126  --    ALT 43  --    AST 45*  --    ALBUMIN 4.1  --    PROT 7.6  --    BILITOT 0.5  --    INR  --  0.9      Recent Labs   Lab 09/30/19  1223   POCTGLUCOSE 95     No results for input(s): LACTATE in the last 72 hours.     Recent Labs     09/30/19  1329   TROPONINI <0.006     Hemoglobin A1C   Date Value Ref Range Status   09/30/2019 5.4 4.0 - 5.6 % Final     Comment:     ADA Screening Guidelines:  5.7-6.4%  Consistent with prediabetes  >or=6.5%  Consistent with diabetes  High levels of fetal hemoglobin interfere with the HbA1C  assay. Heterozygous hemoglobin variants (HbS, HgC, etc)do  not significantly interfere with this assay.   However, presence of multiple variants may affect accuracy.     11/23/2015 5.3 4.5 - 6.2 % Final       Active Hospital Problems    Diagnosis  POA    *Alcohol withdrawal [F10.239]  Yes    Abscess [L02.91]  Yes    Tobacco use disorder [F17.200]  Yes     Chronic    Alcohol use disorder, severe, dependence [F10.20]  Yes     Chronic    Substance induced mood disorder [F19.94]  Yes     Chronic      Resolved Hospital Problems   No resolved problems to display.     Assessment:    43-year-old male with history of EtOH abuse presents for concern for alcohol withdrawal.    Plan:    Alcohol withdrawal  - last drink 24 hours before admission  - on [facility] protocol  - po valium 10 mg q8h and prn lorazepam 2mg q4h   - thiamine, folate, MVI, IVF  - zofran prn  - seziure, fall and aspiration precautions   - NS @ 75 cc/hr  -  on alcohol cessation    #Left shoulder pain  - tylenol and ibuprofen  prn     High Risk Conditions  Patient has a condition that poses threat to life and bodily function: Alcohol Withdrawal      Code: FULL  Diet:  Regular  DVT PPx: SCD/TEDs  Lines: PIV  Disposition: pending clinical condition    Time (minutes) spent in care of the patient (Greater than 1/2 spent in direct face-to-face contact) > 45 minutes    Art Mena MD  Hospital Medicine Staff  Pager: [phone]; Spectra: [phone]
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
## 2019-09-30 NOTE — LETTER
October 2, 2019         [address]  [place] [address]  Phone: [phone]  Fax: [phone]       Patient: Gilmar Clemente   YOB: 1975  Date of Visit: 10/02/2019    To Whom It May Concern:    Devyn Clemente  was at [facility] on 10/02/2019 may return to work/school on 10/3/19.     Be advised that patient will continue medical management with a specific medication until Saturday - 10/5/19 - that could alter his mental awareness and the medical team would not recommend the operation of heavy machinery or motor vehicles.  Additional work modifications should be discussed between the patient and his supervisor for this period of time to allow for a work environment that is safe for his ongoing recovery from his hospital condition as well as the safety and well being of others.      If you have any questions or concerns, or if I can be of further assistance, please do not hesitate to contact me.    Sincerely,      Elliott Welsh MD
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
## 2019-10-01 PROBLEM — R74.01 TRANSAMINITIS: Status: RESOLVED | Noted: 2018-05-07 | Resolved: 2019-10-01

## 2019-10-01 LAB
ANION GAP SERPL CALC-SCNC: 9 MMOL/L (ref 8–16)
BUN SERPL-MCNC: 10 MG/DL (ref 6–20)
CALCIUM SERPL-MCNC: 8.7 MG/DL (ref 8.7–10.5)
CHLORIDE SERPL-SCNC: 102 MMOL/L (ref 95–110)
CO2 SERPL-SCNC: 26 MMOL/L (ref 23–29)
CREAT SERPL-MCNC: 0.8 MG/DL (ref 0.5–1.4)
ERYTHROCYTE [DISTWIDTH] IN BLOOD BY AUTOMATED COUNT: 12.2 % (ref 11.5–14.5)
EST. GFR  (AFRICAN AMERICAN): >60 ML/MIN/1.73 M^2
EST. GFR  (NON AFRICAN AMERICAN): >60 ML/MIN/1.73 M^2
GLUCOSE SERPL-MCNC: 80 MG/DL (ref 70–110)
HCT VFR BLD AUTO: 40.9 % (ref 40–54)
HGB BLD-MCNC: 13.7 G/DL (ref 14–18)
MAGNESIUM SERPL-MCNC: 2.2 MG/DL (ref 1.6–2.6)
MCH RBC QN AUTO: 29.1 PG (ref 27–31)
MCHC RBC AUTO-ENTMCNC: 33.5 G/DL (ref 32–36)
MCV RBC AUTO: 87 FL (ref 82–98)
PHOSPHATE SERPL-MCNC: 2.7 MG/DL (ref 2.7–4.5)
PLATELET # BLD AUTO: 190 K/UL (ref 150–350)
PMV BLD AUTO: 10.5 FL (ref 9.2–12.9)
POTASSIUM SERPL-SCNC: 4.1 MMOL/L (ref 3.5–5.1)
RBC # BLD AUTO: 4.7 M/UL (ref 4.6–6.2)
SODIUM SERPL-SCNC: 137 MMOL/L (ref 136–145)
WBC # BLD AUTO: 5.63 K/UL (ref 3.9–12.7)

## 2019-10-01 PROCEDURE — 84100 ASSAY OF PHOSPHORUS: CPT

## 2019-10-01 PROCEDURE — 36415 COLL VENOUS BLD VENIPUNCTURE: CPT

## 2019-10-01 PROCEDURE — 99232 PR SUBSEQUENT HOSPITAL CARE,LEVL II: ICD-10-PCS | Mod: ,,, | Performed by: INTERNAL MEDICINE

## 2019-10-01 PROCEDURE — 63600175 PHARM REV CODE 636 W HCPCS: Performed by: INTERNAL MEDICINE

## 2019-10-01 PROCEDURE — 11000001 HC ACUTE MED/SURG PRIVATE ROOM

## 2019-10-01 PROCEDURE — G0378 HOSPITAL OBSERVATION PER HR: HCPCS

## 2019-10-01 PROCEDURE — 83735 ASSAY OF MAGNESIUM: CPT

## 2019-10-01 PROCEDURE — 85027 COMPLETE CBC AUTOMATED: CPT

## 2019-10-01 PROCEDURE — 99232 SBSQ HOSP IP/OBS MODERATE 35: CPT | Mod: ,,, | Performed by: INTERNAL MEDICINE

## 2019-10-01 PROCEDURE — 25000003 PHARM REV CODE 250: Performed by: INTERNAL MEDICINE

## 2019-10-01 PROCEDURE — 80048 BASIC METABOLIC PNL TOTAL CA: CPT

## 2019-10-01 RX ORDER — DIAZEPAM 5 MG/1
5 TABLET ORAL EVERY 12 HOURS
Status: DISCONTINUED | OUTPATIENT
Start: 2019-10-04 | End: 2019-10-02 | Stop reason: HOSPADM

## 2019-10-01 RX ORDER — DIAZEPAM 5 MG/1
10 TABLET ORAL EVERY 6 HOURS
Status: DISCONTINUED | OUTPATIENT
Start: 2019-10-01 | End: 2019-10-01

## 2019-10-01 RX ORDER — DIAZEPAM 5 MG/1
10 TABLET ORAL EVERY 12 HOURS
Status: DISCONTINUED | OUTPATIENT
Start: 2019-10-03 | End: 2019-10-02 | Stop reason: HOSPADM

## 2019-10-01 RX ORDER — DIAZEPAM 5 MG/1
10 TABLET ORAL EVERY 6 HOURS
Status: COMPLETED | OUTPATIENT
Start: 2019-10-01 | End: 2019-10-01

## 2019-10-01 RX ORDER — DIAZEPAM 5 MG/1
10 TABLET ORAL EVERY 8 HOURS
Status: DISCONTINUED | OUTPATIENT
Start: 2019-10-02 | End: 2019-10-02 | Stop reason: HOSPADM

## 2019-10-01 RX ORDER — DIAZEPAM 5 MG/1
5 TABLET ORAL ONCE
Status: DISCONTINUED | OUTPATIENT
Start: 2019-10-05 | End: 2019-10-02 | Stop reason: HOSPADM

## 2019-10-01 RX ADMIN — SODIUM CHLORIDE: 0.9 INJECTION, SOLUTION INTRAVENOUS at 04:10

## 2019-10-01 RX ADMIN — DIAZEPAM 10 MG: 5 TABLET ORAL at 05:10

## 2019-10-01 RX ADMIN — Medication 100 MG: at 08:10

## 2019-10-01 RX ADMIN — THERA TABS 1 TABLET: TAB at 08:10

## 2019-10-01 RX ADMIN — LORAZEPAM 2 MG: 0.5 TABLET ORAL at 08:10

## 2019-10-01 RX ADMIN — FOLIC ACID 1 MG: 1 TABLET ORAL at 08:10

## 2019-10-01 RX ADMIN — LORAZEPAM 2 MG: 0.5 TABLET ORAL at 06:10

## 2019-10-01 RX ADMIN — DIAZEPAM 10 MG: 5 TABLET ORAL at 11:10

## 2019-10-01 RX ADMIN — ONDANSETRON 4 MG: 2 INJECTION INTRAMUSCULAR; INTRAVENOUS at 01:10

## 2019-10-01 RX ADMIN — LORAZEPAM 2 MG: 0.5 TABLET ORAL at 01:10

## 2019-10-01 NOTE — HPI
43 M with history of alcohol abuse who presented on 9/30/19 with alcohol withdrawal.  At presentation: patient reports that he has not had a drink in over 24 hrs and feels very anxious and has generalized body pain. He states that he has been previously hospitalized for alcohol intoxication/withdrawal but this time it's more severe.  He reports tremulousness, severe anxiety, nausea and confusion.       He states that he cannot remember what happened in the last 2 days since he was seen in the ED for a shoulder injury. He is unsure whether not he has used drugs in the last several days.  He denies seizures, visual disturbances, hallucinations, headache, vomiting or paresthesias.  No history of alcohol withdrawal seizures.

## 2019-10-01 NOTE — PLAN OF CARE
CM met with patient to obtain discharge planning assessment.  Patient is admitted with alcohol withdrawal.  Planned discharge is home - Plan (A) and (B).    PCP:  Primary Doctor No     Payor:  Payor: MEDICAID / Plan: HE[employer] BLUE ([employer] [place] / Product Type: Managed Medicaid /      Pharmacy:    [id number] - [place], [place] - [address]  [address]  [place] [address]  Phone: [phone] Fax: [phone]       10/01/19 1301   Discharge Assessment   Assessment Type Discharge Planning Assessment   Confirmed/corrected address and phone number on facesheet? Yes   Assessment information obtained from? Patient   Expected Length of Stay (days) 2   Communicated expected length of stay with patient/caregiver yes   Prior to hospitilization cognitive status: Alert/Oriented   Prior to hospitalization functional status: Independent   Current cognitive status: Alert/Oriented   Current Functional Status: Independent   Lives With friend(s)   Able to Return to Prior Arrangements yes   Is patient able to care for self after discharge? Yes   Patient's perception of discharge disposition home or selfcare   Readmission Within the Last 30 Days no previous admission in last 30 days   Patient currently being followed by outpatient case management? No   Patient currently receives any other outside agency services? No   Equipment Currently Used at Home none   Do you have any problems affording any of your prescribed medications? No   Is the patient taking medications as prescribed? yes   Does the patient have transportation home? No   Does the patient receive services at the [facility]? No   Discharge Plan A Home   Discharge Plan B Home   DME Needed Upon Discharge  none   Patient/Family in Agreement with Plan yes

## 2019-10-01 NOTE — ASSESSMENT & PLAN NOTE
>Mild AST elevation on intake labs  >Resolved by hospital day 2   >Given normal labs - will discontinue daily labs

## 2019-10-01 NOTE — ASSESSMENT & PLAN NOTE
Medication Management  >Valium 10 mg, increased frequency to q6h for next two doses; plan for q8h ON; then decreased for day tomorrow with plan to taper off by 10/3.    >Ativan 2mg q4h PRN - x2 doses needed ON.  CIWA <<8 this AM.  Spoke with patient and nurse at bedside with plan to focus on scheduled Valium. Educated patient as to si/sx for request of as needed Ativan.     >MV, Thiamine, Folic supplementation    >Labs WNL - no additional labs needed at this time    Disposition Planning:   >Interest in IOP resources - CM to provide   >Supportive Factors: employment, periods of abstinence  >MARIE - tentative 10/2 - patient has to [place] for job duty on [place]

## 2019-10-01 NOTE — PROGRESS NOTES
[facility] Hospital Medicine  Progress Note    Patient Name: Gilmar Clemente  MRN: [id number]  Patient Class: IP- Inpatient   Admission Date: 9/30/2019  Length of Stay: 1 days  Attending Physician: Elliott Welsh MD  Primary Care Provider: Primary Doctor [facility] Medicine Team: [facility] MED O Elliott Welsh MD    Subjective:     Principal Problem:Alcohol withdrawal        HPI:  43 M with history of alcohol abuse who present ed on 9/30/19 with alcohol withdrawal.  At presentation: patient reports that he has not had a drink in over 24 hrs and feels very anxious and has generalized body pain. He states that he has been previously hospitalized for alcohol intoxication/withdrawal but this time it's more severe.  He reports tremulousness, severe anxiety, nausea and confusion.       He states that he cannot remember what happened in the last 2 days since he was seen in the ED for a shoulder injury. He is unsure whether not he has used drugs in the last several days.  He denies seizures, visual disturbances, hallucinations, headache, vomiting or paresthesias.  No history of alcohol withdrawal seizures.    Overview/Hospital Course:  Patient admitted to hospital medicine and initiated on oral benzodiazepine taper with Valium and symptom/CIWA release Ativan as needed as well as MV, thiamine, and folic acid.     Good response to medication management experienced thru hospital day-1.  Subjective anxiety endorsed as concern.  Valium 10 scheduled q6, plan to de-escalate strength and frequency as time from last drink (9/29) increased and symptoms subsided.     Interval History: Patient is reporting feeling better today notably with less anxiety endorses benefit from scheduled Valium.  Mild HA and tremulousness persists though improved.     He is motivated to taper his medications as quickly as possible due to a work commitment [place] on 10/3/19.     Review of Systems   Constitutional: Negative.    HENT:  Negative.    Eyes: Negative for photophobia and visual disturbance.   Respiratory: Negative.    Cardiovascular: Negative.  Negative for palpitations.   Gastrointestinal: Negative.  Negative for diarrhea, nausea and vomiting.   Endocrine: Negative.    Genitourinary: Negative.    Musculoskeletal: Positive for joint swelling (r shoulder) and myalgias (mild - diffuse. ).   Skin: Negative.  Negative for color change and rash.   Allergic/Immunologic: Negative.    Neurological: Negative.    Hematological: Negative.    Psychiatric/Behavioral: Negative for agitation, confusion, dysphoric mood, hallucinations, self-injury and suicidal ideas. The patient is nervous/anxious.      Objective:     Vital Signs (Most Recent):  Temp: 97.5 °F (36.4 °C) (10/01/19 0730)  Pulse: 72 (10/01/19 0730)  Resp: 12 (10/01/19 0730)  BP: (!) 158/86 (10/01/19 0730)  SpO2: 98 % (10/01/19 0730) Vital Signs (24h Range):  Temp:  [97.4 °F (36.3 °C)-98.5 °F (36.9 °C)] 97.5 °F (36.4 °C)  Pulse:  [62-87] 72  Resp:  [12-20] 12  SpO2:  [97 %-100 %] 98 %  BP: (134-160)/(75-98) 158/86     Weight: 86.2 kg (190 lb)  Body mass index is 26.5 kg/m².    Intake/Output Summary (Last 24 hours) at 10/1/2019 1018  Last data filed at 10/1/2019 0805  Gross per 24 hour   Intake 240 ml   Output --   Net 240 ml      Physical Exam   Constitutional: He is oriented to person, place, and time. He appears well-nourished. No distress.   Eyes: Pupils are equal, round, and reactive to light. Conjunctivae and EOM are normal.   Neck: Normal range of motion.   Cardiovascular: Normal rate, regular rhythm, normal heart sounds and intact distal pulses.   No murmur heard.  Pulmonary/Chest: Effort normal and breath sounds normal. No stridor. No respiratory distress.   Abdominal: Soft. Bowel sounds are normal.   Musculoskeletal: Normal range of motion. He exhibits tenderness (L shoulder - prominence of clavicular insertion - tender to palpation, no erythema, effusion, or dislocation. ROM  intact, no vascular or neurologic sequelae ).   Neurological: He is alert and oriented to person, place, and time.   Skin: Skin is warm and dry. He is not diaphoretic.   Psychiatric: He has a normal mood and affect.   Nursing note and vitals reviewed.      Significant Labs:   CBC:   Recent Labs   Lab 09/30/19  0814 10/01/19  0532   WBC 8.99 5.63   HGB 15.9 13.7*   HCT 45.7 40.9    190     CMP:   Recent Labs   Lab 09/30/19  0814 10/01/19  0532    137   K 4.0 4.1    102   CO2 19* 26   GLU 80 80   BUN 14 10   CREATININE 0.8 0.8   CALCIUM 8.5* 8.7   PROT 7.6  --    ALBUMIN 4.1  --    BILITOT 0.5  --    ALKPHOS 126  --    AST 45*  --    ALT 43  --    ANIONGAP 18* 9   EGFRNONAA >60.0 >60.0       Significant Imaging: I have reviewed and interpreted all pertinent imaging results/findings within the past 24 hours.      Assessment/Plan:      * Alcohol withdrawal  Medication Management  >Valium 10 mg, increased frequency to q6h for next two doses; plan for q8h ON; then decreased for day tomorrow with plan to taper off by 10/3.    >Ativan 2mg q4h PRN - x2 doses needed ON.  CIWA <<8 this AM.  Spoke with patient and nurse at bedside with plan to focus on scheduled Valium. Educated patient as to si/sx for request of as needed Ativan.     >MV, Thiamine, Folic supplementation    >Labs WNL - no additional labs needed at this time    Disposition Planning:   >Interest in IOP resources - CM to provide   >Supportive Factors: employment, periods of abstinence  >MARIE - tentative 10/2 - patient has to report for job duty on 10/3 [place]      VTE Risk Mitigation (From admission, onward)         Ordered     Place NIDA hose  Until discontinued      09/30/19 1214     Place sequential compression device  Until discontinued      09/30/19 1214     IP VTE LOW RISK PATIENT  Once      09/30/19 1214                      Elliott Welsh MD  Department of Hospital Medicine   [facility]

## 2019-10-01 NOTE — SUBJECTIVE & OBJECTIVE
Interval History: Patient is reporting feeling better today notably with less anxiety endorses benefit from scheduled Valium.  Mild HA and tremulousness persists though improved.     He is motivated to taper his medications as quickly as possible due to a work commitment [place] on 10/3/19.     Review of Systems   Constitutional: Negative.    HENT: Negative.    Eyes: Negative for photophobia and visual disturbance.   Respiratory: Negative.    Cardiovascular: Negative.  Negative for palpitations.   Gastrointestinal: Negative.  Negative for diarrhea, nausea and vomiting.   Endocrine: Negative.    Genitourinary: Negative.    Musculoskeletal: Positive for joint swelling (r shoulder) and myalgias (mild - diffuse. ).   Skin: Negative.  Negative for color change and rash.   Allergic/Immunologic: Negative.    Neurological: Negative.    Hematological: Negative.    Psychiatric/Behavioral: Negative for agitation, confusion, dysphoric mood, hallucinations, self-injury and suicidal ideas. The patient is nervous/anxious.      Objective:     Vital Signs (Most Recent):  Temp: 97.5 °F (36.4 °C) (10/01/19 0730)  Pulse: 72 (10/01/19 0730)  Resp: 12 (10/01/19 0730)  BP: (!) 158/86 (10/01/19 0730)  SpO2: 98 % (10/01/19 0730) Vital Signs (24h Range):  Temp:  [97.4 °F (36.3 °C)-98.5 °F (36.9 °C)] 97.5 °F (36.4 °C)  Pulse:  [62-87] 72  Resp:  [12-20] 12  SpO2:  [97 %-100 %] 98 %  BP: (134-160)/(75-98) 158/86     Weight: 86.2 kg (190 lb)  Body mass index is 26.5 kg/m².    Intake/Output Summary (Last 24 hours) at 10/1/2019 1018  Last data filed at 10/1/2019 0805  Gross per 24 hour   Intake 240 ml   Output --   Net 240 ml      Physical Exam   Constitutional: He is oriented to person, place, and time. He appears well-nourished. No distress.   Eyes: Pupils are equal, round, and reactive to light. Conjunctivae and EOM are normal.   Neck: Normal range of motion.   Cardiovascular: Normal rate, regular rhythm, normal heart sounds and intact distal  pulses.   No murmur heard.  Pulmonary/Chest: Effort normal and breath sounds normal. No stridor. No respiratory distress.   Abdominal: Soft. Bowel sounds are normal.   Musculoskeletal: Normal range of motion. He exhibits tenderness (L shoulder - prominence of clavicular insertion - tender to palpation, no erythema, effusion, or dislocation. ROM intact, no vascular or neurologic sequelae ).   Neurological: He is alert and oriented to person, place, and time.   Skin: Skin is warm and dry. He is not diaphoretic.   Psychiatric: He has a normal mood and affect.   Nursing note and vitals reviewed.      Significant Labs:   CBC:   Recent Labs   Lab 09/30/19  0814 10/01/19  0532   WBC 8.99 5.63   HGB 15.9 13.7*   HCT 45.7 40.9    190     CMP:   Recent Labs   Lab 09/30/19  0814 10/01/19  0532    137   K 4.0 4.1    102   CO2 19* 26   GLU 80 80   BUN 14 10   CREATININE 0.8 0.8   CALCIUM 8.5* 8.7   PROT 7.6  --    ALBUMIN 4.1  --    BILITOT 0.5  --    ALKPHOS 126  --    AST 45*  --    ALT 43  --    ANIONGAP 18* 9   EGFRNONAA >60.0 >60.0       Significant Imaging: I have reviewed and interpreted all pertinent imaging results/findings within the past 24 hours.

## 2019-10-01 NOTE — PLAN OF CARE
Patient remained free of falls and injuries during shift.  Patient took medications without incident.  No other concerns noted.

## 2019-10-01 NOTE — PLAN OF CARE
10/01/19 1253   Post-Acute Status   Post-Acute Authorization Other   Other Status No Post-Acute Service Needs       SW is following this Pt for DC planning needs. There are no identified needs at this time.      Christine Mederos LMSW  [facility]  [id number]

## 2019-10-01 NOTE — HOSPITAL COURSE
Patient admitted to hospital medicine and initiated on oral benzodiazepine taper with Valium and symptom/CIWA release Ativan as needed as well as MV, thiamine, and folic acid.     Good response to medication management experienced thru hospital day-1.  Subjective anxiety endorsed as concern.  Valium 10 scheduled q6, plan to de-escalate strength and frequency as time from last drink (9/29) increased and symptoms subsided.  Patient remained stable - 5 day taper was prescribed at discharge.

## 2019-10-01 NOTE — PLAN OF CARE
"  Problem: Fall Injury Risk  Goal: Absence of Fall and Fall-Related Injury  Outcome: Ongoing, Progressing  Intervention: Identify and Manage Contributors to Fall Injury Risk  Flowsheets (Taken 10/1/2019 1742)  Self-Care Promotion: meal setup provided; independence encouraged  Medication Review/Management: medications reviewed  Intervention: Promote Injury-Free Environment  Flowsheets (Taken 10/1/2019 1742)  Safety Promotion/Fall Prevention: assistive device/personal item within reach; bed alarm refused; diversional activities provided  Environmental Safety Modification: assistive device/personal items within reach; lighting adjusted; room near unit station; clutter free environment maintained     Problem: Adult Inpatient Plan of Care  Goal: Plan of Care Review  Outcome: Ongoing, Progressing  Goal: Patient-Specific Goal (Individualization)  Outcome: Ongoing, Progressing  Goal: Absence of Hospital-Acquired Illness or Injury  Outcome: Ongoing, Progressing  Intervention: Identify and Manage Fall Risk  Flowsheets (Taken 10/1/2019 1742)  Safety Promotion/Fall Prevention: assistive device/personal item within reach; bed alarm refused; diversional activities provided  Goal: Optimal Comfort and Wellbeing  Outcome: Ongoing, Progressing  Goal: Readiness for Transition of Care  Outcome: Ongoing, Progressing  Goal: Rounds/Family Conference  Outcome: Ongoing, Progressing    Pt awake and alert, able to make needs known, denies any pain. Valium as ordered and prn ativan around the clock for anxious, fidgeting, slight tremor. Fair appetite for meals. Educated on call light use, refuses bed alarm stating "I'm a grown man". IV fluids d/c per order. Good fluid intake. Bed low and locked and call light within reach. Non skid socks when OOB.      "

## 2019-10-02 VITALS
WEIGHT: 190 LBS | HEART RATE: 83 BPM | HEIGHT: 71 IN | BODY MASS INDEX: 26.6 KG/M2 | TEMPERATURE: 98 F | OXYGEN SATURATION: 98 % | RESPIRATION RATE: 18 BRPM | DIASTOLIC BLOOD PRESSURE: 78 MMHG | SYSTOLIC BLOOD PRESSURE: 140 MMHG

## 2019-10-02 PROBLEM — M25.511 CHRONIC RIGHT SHOULDER PAIN: Status: ACTIVE | Noted: 2019-10-02

## 2019-10-02 PROBLEM — G89.29 CHRONIC RIGHT SHOULDER PAIN: Status: ACTIVE | Noted: 2019-10-02

## 2019-10-02 PROBLEM — F10.939 ALCOHOL WITHDRAWAL: Status: RESOLVED | Noted: 2019-09-30 | Resolved: 2019-10-02

## 2019-10-02 PROCEDURE — 25000003 PHARM REV CODE 250: Performed by: INTERNAL MEDICINE

## 2019-10-02 PROCEDURE — G0378 HOSPITAL OBSERVATION PER HR: HCPCS

## 2019-10-02 PROCEDURE — 99231 PR SUBSEQUENT HOSPITAL CARE,LEVL I: ICD-10-PCS | Mod: ,,, | Performed by: INTERNAL MEDICINE

## 2019-10-02 PROCEDURE — 99231 SBSQ HOSP IP/OBS SF/LOW 25: CPT | Mod: ,,, | Performed by: INTERNAL MEDICINE

## 2019-10-02 RX ORDER — DIAZEPAM 5 MG/1
5 TABLET ORAL ONCE
Qty: 1 TABLET | Refills: 0 | Status: ON HOLD | OUTPATIENT
Start: 2019-10-05 | End: 2019-10-17 | Stop reason: HOSPADM

## 2019-10-02 RX ORDER — DIAZEPAM 5 MG/1
5 TABLET ORAL ONCE
Qty: 1 TABLET | Refills: 0 | Status: SHIPPED | OUTPATIENT
Start: 2019-10-05 | End: 2019-10-02

## 2019-10-02 RX ORDER — DIAZEPAM 5 MG/1
5 TABLET ORAL EVERY 12 HOURS
Qty: 2 TABLET | Refills: 0 | Status: SHIPPED | OUTPATIENT
Start: 2019-10-04 | End: 2019-10-02

## 2019-10-02 RX ORDER — DIAZEPAM 10 MG/1
10 TABLET ORAL EVERY 12 HOURS
Qty: 2 TABLET | Refills: 0 | Status: SHIPPED | OUTPATIENT
Start: 2019-10-03 | End: 2019-10-04

## 2019-10-02 RX ORDER — DIAZEPAM 10 MG/1
10 TABLET ORAL EVERY 8 HOURS
Qty: 2 TABLET | Refills: 0 | Status: SHIPPED | OUTPATIENT
Start: 2019-10-02 | End: 2019-10-03

## 2019-10-02 RX ORDER — DIAZEPAM 10 MG/1
10 TABLET ORAL EVERY 8 HOURS
Qty: 2 TABLET | Refills: 0 | Status: SHIPPED | OUTPATIENT
Start: 2019-10-02 | End: 2019-10-02

## 2019-10-02 RX ORDER — DIAZEPAM 10 MG/1
10 TABLET ORAL EVERY 12 HOURS
Qty: 2 TABLET | Refills: 0 | Status: SHIPPED | OUTPATIENT
Start: 2019-10-03 | End: 2019-10-02

## 2019-10-02 RX ORDER — DIAZEPAM 5 MG/1
5 TABLET ORAL EVERY 12 HOURS
Qty: 2 TABLET | Refills: 0 | Status: SHIPPED | OUTPATIENT
Start: 2019-10-04 | End: 2019-10-05

## 2019-10-02 RX ADMIN — DIAZEPAM 10 MG: 5 TABLET ORAL at 06:10

## 2019-10-02 RX ADMIN — FOLIC ACID 1 MG: 1 TABLET ORAL at 08:10

## 2019-10-02 RX ADMIN — Medication 100 MG: at 08:10

## 2019-10-02 RX ADMIN — THERA TABS 1 TABLET: TAB at 08:10

## 2019-10-02 NOTE — ASSESSMENT & PLAN NOTE
>Nonactive issue for this hospital stay; patient was abstinent of nicotine/tobacco use for entirety of stay

## 2019-10-02 NOTE — PLAN OF CARE
10/02/19 1508   Final Note   Assessment Type Final Discharge Note   Anticipated Discharge Disposition Home   Right Care Referral Info   Post Acute Recommendation No Care       Pt DC with no needs.      Christine Mederos LMSW  [facility]  [id number]

## 2019-10-02 NOTE — ASSESSMENT & PLAN NOTE
>No mood disturbance for this hospitalization - denied SI/HI - with good affect throughout.  Motivated for abstinence to return to work.

## 2019-10-02 NOTE — PLAN OF CARE
Patient discharged home.  The patient does not have any home needs.      No future appointments.      10/02/19 1642   Final Note   Assessment Type Final Discharge Note   Anticipated Discharge Disposition Home   Hospital Follow Up  Appt(s) scheduled? No   Discharge plans and expectations educations in teach back method with documentation complete? Yes

## 2019-10-02 NOTE — DISCHARGE SUMMARY
[facility] Hospital Medicine  Discharge Summary      Patient Name: Gilmar Clemente  MRN: [id number]  Admission Date: 9/30/2019  Hospital Length of Stay: 2 days  Discharge Date and Time:  10/02/2019 10:38 AM  Attending Physician: Elliott Welsh MD   Discharging Provider: Elliott Welsh MD  Primary Care Provider: Primary Doctor [facility] Medicine Team: [facility] MED O Elliott Welsh MD    HPI:   43 M with history of alcohol abuse who present ed on 9/30/19 with alcohol withdrawal.  At presentation: patient reported that he had drank in over 24 hrs. At time of presentation - endorsed both anxiousnous, confusion, and has generalized body pain.  He denied seizures, visual disturbances, hallucinations, headache, vomiting or paresthesias.  No history of alcohol withdrawal seizures. Patient with previous hospitalization for ETOH Withdrawal.      Patient also was evaluated for chronic left shoulder pain with plain film imaging.  No acute management was needed and patient reports need for outpatient referral for this and other primary care items.         Hospital Course:   Patient admitted to hospital medicine and initiated on oral benzodiazepine taper with Valium and symptom/CIWA release Ativan as needed as well as MV, thiamine, and folic acid.     Good response to medication management experienced thru hospital day-1.  Subjective anxiety endorsed as concern.  Valium 10 scheduled q6, plan to de-escalate strength and frequency as time from last drink (9/29) increased and symptoms subsided.  Patient remained stable - 5 day taper was prescribed at discharge.      Interval History / Discharge Day Update:  Patient was asymptomatic for concerns of ETOH withdrawal on day of discharge.  He is ambulatory without any issues.  Tolerating food/liquids without issue. Normal voiding habits endorsed.  Patient is very motivated to discharge today as he has to report to work in the early morning tomorrow.  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  The patient still has three-days remaining of his Valium taper.  Given the work circumstance, we will discharge him with oral taper through 10/5/19 as detailed below.  Work has been a positive factor for abstinence and continued hospitalization would imperil his employment and pose risk for sobriety. I have made very clear that he is to abstain from alcohol while on the taper and that he must disclose to his supervisor that he is on a potentially mood/consciousness altering medication for the safety of himself and those in his work place.  A work note was provided to this end.  Referrals placed to internal medicine primary care and psychiatry for addiction specialist at patient request.     Consults:  None    Review of Systems   Constitutional: Negative for chills, diaphoresis and fever.   HENT: Negative.    Eyes: Negative for blurred vision, double vision and photophobia.   Respiratory: Negative for cough and shortness of breath.    Cardiovascular: Negative for chest pain, palpitations and leg swelling.   Gastrointestinal: Negative for abdominal pain, diarrhea, heartburn, nausea and vomiting.   Genitourinary: Negative for dysuria and urgency.   Musculoskeletal: Negative for myalgias.   Skin: Negative for rash.   Neurological: Negative for dizziness, tremors, speech change, focal weakness and headaches.   Psychiatric/Behavioral: Negative for depression, hallucinations and suicidal ideas. The patient is not nervous/anxious and does not have insomnia.      Vitals:    10/02/19 0835   BP: (!) 140/78   Pulse: 83   Resp: 18   Temp: 97.9 °F (36.6 °C)     Vitals:    10/02/19 0311 10/02/19 0453 10/02/19 0700 10/02/19 0835   BP:  (!) 135/93  (!) 140/78   BP Location:  Left arm     Patient Position:  Lying  Lying   Pulse: 97 96 64 83   Resp:  18  18   Temp:  98.5 °F (36.9 °C)  97.9 °F (36.6 °C)   TempSrc:  Oral  Oral   SpO2:  97%  98%   Weight:       Height:          Physical Exam   Constitutional: He is oriented to person, place, and time. No distress.   Eyes: Pupils are equal, round, and reactive to light. EOM are normal.   Neck: Normal range of motion.   Cardiovascular: Normal rate, regular rhythm and normal heart sounds. Exam reveals no friction rub.   No murmur heard.  Pulmonary/Chest: Effort normal and breath sounds normal. No respiratory distress.   Abdominal: Soft. Bowel sounds are normal. He exhibits no distension. There is no tenderness. There is no guarding.   Musculoskeletal: Normal range of motion.   Left shoulder - raised prominence, tender to palpation, normal ROM, neuro-vascular intact   Neurological: He is alert and oriented to person, place, and time.   Skin: Skin is warm and dry. He is not diaphoretic.   Psychiatric: He has a normal mood and affect. His behavior is normal. Judgment and thought content normal.     Recent Labs   Lab 10/01/19  0532      K 4.1      CO2 26   GLU 80   BUN 10   CREATININE 0.8   CALCIUM 8.7   ANIONGAP 9   ESTGFRAFRICA >60.0   EGFRNONAA >60.0       Pending Diagnostic Studies:     None           Assessment       Final Active Diagnoses:    Diagnosis Date Noted POA    Alcohol use disorder, severe, dependence [F10.20] 05/03/2018 Yes     Chronic    Substance induced mood disorder [F19.94] 05/03/2018 Yes     Chronic    Tobacco use disorder [F17.200] 05/04/2018 Yes     Chronic      Problems Resolved During this Admission:    Diagnosis Date Noted Date Resolved POA    PRINCIPAL PROBLEM:  Alcohol withdrawal [F10.239] 09/30/2019 10/02/2019 Yes    Transaminitis [R74.0] 05/07/2018 10/01/2019 Yes         1. Alcohol withdrawal  Medication Management  >Valium taper - continue as OP thru 10/5 with abstinence. MV, Thiamine, Folic supplementation while inpatient, concluded.     >Labs WNL - no additional labs required for hospital stay    2. Alcohol use disorder - severe dependence  >Third hospitalization in three years  >Declined  resources for inpatient rehab, prefers IOP options  >F/U with Psychiatry / Addiction Specialist   >Employment is a protective factor; motivated to stay employed - longer periods of abstinence as a result of current job.      3. Substance induced mood disorder  >Intactive for this stay  >H/O SI at [facility] in 2018 while intoxicated - PEC'd at that time.  No such issues during this stay - good judgement, mood, and affect.  Protective factors as described above.    4. Tobacco use disorder   >No issues while inpatient     Discharged Condition: good    Disposition: Home or Self Care    Follow Up:  Follow-up Information     Primary Doctor No.    Why:  Referral for Primary Care placed                Patient Instructions:      Ambulatory Referral to Internal Medicine   Referral Priority: Routine Referral Type: Consultation   Referral Reason: Specialty Services Required   Requested Specialty: Internal Medicine   Number of Visits Requested: 1     Ambulatory Referral to Psychiatry   Referral Priority: Routine Referral Type: Psychiatric   Referral Reason: Specialty Services Required   Requested Specialty: Psychiatry   Number of Visits Requested: 1     Ambulatory Referral to Internal Medicine   Referral Priority: Routine Referral Type: Consultation   Referral Reason: Specialty Services Required   Requested Specialty: Internal Medicine   Number of Visits Requested: 1     Ambulatory Referral to Psychiatry   Referral Priority: Routine Referral Type: Psychiatric   Referral Reason: Specialty Services Required   Requested Specialty: Psychiatry   Number of Visits Requested: 1     Diet Adult Regular     Diet Adult Regular     Notify your health care provider if you experience any of the following:  difficulty breathing or increased cough     Notify your health care provider if you experience any of the following:  worsening rash     Notify your health care provider if you experience any of the following:  persistent dizziness,  light-headedness, or visual disturbances     Notify your health care provider if you experience any of the following:  increased confusion or weakness     Notify your health care provider if you experience any of the following:  persistent nausea and vomiting or diarrhea     Notify your health care provider if you experience any of the following:  difficulty breathing or increased cough     Notify your health care provider if you experience any of the following:  worsening rash     Notify your health care provider if you experience any of the following:  persistent dizziness, light-headedness, or visual disturbances     Notify your health care provider if you experience any of the following:  increased confusion or weakness     Activity as tolerated     Activity as tolerated       Significant Diagnostic Studies: Labs:   CMP     Medications:  Reconciled Home Medications:      Medication List      START taking these medications    * diazePAM 10 MG Tab  Commonly known as:  VALIUM  Take 1 tablet (10 mg total) by mouth every 8 (eight) hours. for 1 day     * diazePAM 10 MG Tab  Commonly known as:  VALIUM  Take 1 tablet (10 mg total) by mouth every 12 (twelve) hours. for 1 day  Start taking on:  October 3, 2019     * diazePAM 5 MG tablet  Commonly known as:  VALIUM  Take 1 tablet (5 mg total) by mouth every 12 (twelve) hours. for 1 day  Start taking on:  October 4, 2019     * diazePAM 5 MG tablet  Commonly known as:  VALIUM  Take 1 tablet (5 mg total) by mouth once. for 1 dose  Start taking on:  October 5, 2019         * This list has 4 medication(s) that are the same as other medications prescribed for you. Read the directions carefully, and ask your doctor or other care provider to review them with you.            CONTINUE taking these medications    naproxen 500 MG tablet  Commonly known as:  NAPROSYN  Take 1 tablet (500 mg total) by mouth 2 (two) times daily with meals.        STOP taking these medications    ibuprofen  600 MG tablet  Commonly known as:  ADVIL,MOTRIN            Indwelling Lines/Drains at time of discharge:   Lines/Drains/Airways     None                 Time spent on the discharge of patient: 30 minutes  Patient was seen and examined on the date of discharge and determined to be suitable for discharge.         Elliott Welsh MD  Department of Hospital Medicine  [facility]

## 2019-10-02 NOTE — DISCHARGE INSTRUCTIONS
Please provide your supervisor the note provided by the physician.  It is your responsbility to notify them of your ongoing medication use as discussed with your provider in the hospital.

## 2019-10-02 NOTE — PLAN OF CARE
10/02/2019      Gilmar Clemente  [address] [address]          Hospital Medicine Dept.  [facility] [address] [place] [id number] [phone] after hours  [phone] fax Patient: Gilmar Clemente   YOB: 1975  Date of Visit: 10/02/2019    To Whom It May Concern:    Devyn Clemente  was at [facility] on 10/02/2019 may return to work/school on 10/3/19.     Be advised that patient will continue medical management with a specific medication until Saturday - 10/5/19 - that could alter his mental awareness and the medical team would not recommend the operation of heavy machinery or motor vehicles.  Additional work modifications should be discussed between the patient and his supervisor for this period of time to allow for a work environment that is safe for his ongoing recovery from his hospital condition as well as the safety and well being of others.      If you have any questions or concerns, or if I can be of further assistance, please do not hesitate to contact me.    Sincerely,      Elliott Welsh MD

## 2019-10-02 NOTE — NURSING
Discharge instructions explained and given to patient.  Prescriptions and work note given to patient.  IV/TELE removed.  Patient refused WC and preferred to walk.

## 2019-10-14 ENCOUNTER — HOSPITAL ENCOUNTER (OUTPATIENT)
Facility: HOSPITAL | Age: 44
Discharge: HOME | DRG: 897 | End: 2019-10-18
Attending: EMERGENCY MEDICINE | Admitting: INTERNAL MEDICINE
Payer: MEDICAID

## 2019-10-14 DIAGNOSIS — F10.930 ALCOHOL WITHDRAWAL SYNDROME WITHOUT COMPLICATION: ICD-10-CM

## 2019-10-14 DIAGNOSIS — F10.920 ALCOHOLIC INTOXICATION WITHOUT COMPLICATION: Primary | ICD-10-CM

## 2019-10-14 DIAGNOSIS — K75.9 HEPATITIS: ICD-10-CM

## 2019-10-14 LAB
ALBUMIN SERPL BCP-MCNC: 3.9 G/DL (ref 3.5–5.2)
ALP SERPL-CCNC: 194 U/L (ref 55–135)
ALT SERPL W/O P-5'-P-CCNC: 677 U/L (ref 10–44)
ANION GAP SERPL CALC-SCNC: 12 MMOL/L (ref 8–16)
APAP SERPL-MCNC: <3 UG/ML (ref 10–20)
AST SERPL-CCNC: 502 U/L (ref 10–40)
BASOPHILS # BLD AUTO: 0.08 K/UL (ref 0–0.2)
BASOPHILS NFR BLD: 1 % (ref 0–1.9)
BILIRUB SERPL-MCNC: 0.4 MG/DL (ref 0.1–1)
BUN SERPL-MCNC: 8 MG/DL (ref 6–20)
CALCIUM SERPL-MCNC: 8.7 MG/DL (ref 8.7–10.5)
CHLORIDE SERPL-SCNC: 108 MMOL/L (ref 95–110)
CO2 SERPL-SCNC: 25 MMOL/L (ref 23–29)
CREAT SERPL-MCNC: 1 MG/DL (ref 0.5–1.4)
DIFFERENTIAL METHOD: ABNORMAL
EOSINOPHIL # BLD AUTO: 0.3 K/UL (ref 0–0.5)
EOSINOPHIL NFR BLD: 3 % (ref 0–8)
ERYTHROCYTE [DISTWIDTH] IN BLOOD BY AUTOMATED COUNT: 13.8 % (ref 11.5–14.5)
EST. GFR  (AFRICAN AMERICAN): >60 ML/MIN/1.73 M^2
EST. GFR  (NON AFRICAN AMERICAN): >60 ML/MIN/1.73 M^2
ETHANOL SERPL-MCNC: 291 MG/DL
GLUCOSE SERPL-MCNC: 84 MG/DL (ref 70–110)
HCT VFR BLD AUTO: 46.7 % (ref 40–54)
HGB BLD-MCNC: 15.5 G/DL (ref 14–18)
HIV1+2 IGG SERPL QL IA.RAPID: NEGATIVE
IMM GRANULOCYTES # BLD AUTO: 0.07 K/UL (ref 0–0.04)
IMM GRANULOCYTES NFR BLD AUTO: 0.8 % (ref 0–0.5)
INR PPP: 1 (ref 0.8–1.2)
LIPASE SERPL-CCNC: 42 U/L (ref 4–60)
LYMPHOCYTES # BLD AUTO: 3.5 K/UL (ref 1–4.8)
LYMPHOCYTES NFR BLD: 41.7 % (ref 18–48)
MCH RBC QN AUTO: 29.7 PG (ref 27–31)
MCHC RBC AUTO-ENTMCNC: 33.2 G/DL (ref 32–36)
MCV RBC AUTO: 90 FL (ref 82–98)
MONOCYTES # BLD AUTO: 0.8 K/UL (ref 0.3–1)
MONOCYTES NFR BLD: 9 % (ref 4–15)
NEUTROPHILS # BLD AUTO: 3.7 K/UL (ref 1.8–7.7)
NEUTROPHILS NFR BLD: 44.5 % (ref 38–73)
NRBC BLD-RTO: 0 /100 WBC
PLATELET # BLD AUTO: 169 K/UL (ref 150–350)
PMV BLD AUTO: 9.1 FL (ref 9.2–12.9)
POTASSIUM SERPL-SCNC: 4 MMOL/L (ref 3.5–5.1)
PROT SERPL-MCNC: 7.7 G/DL (ref 6–8.4)
PROTHROMBIN TIME: 10.6 SEC (ref 9–12.5)
RBC # BLD AUTO: 5.22 M/UL (ref 4.6–6.2)
SODIUM SERPL-SCNC: 145 MMOL/L (ref 136–145)
WBC # BLD AUTO: 8.33 K/UL (ref 3.9–12.7)

## 2019-10-14 PROCEDURE — 80053 COMPREHEN METABOLIC PANEL: CPT

## 2019-10-14 PROCEDURE — G0378 HOSPITAL OBSERVATION PER HR: HCPCS

## 2019-10-14 PROCEDURE — 80320 DRUG SCREEN QUANTALCOHOLS: CPT

## 2019-10-14 PROCEDURE — 86703 HIV-1/HIV-2 1 RESULT ANTBDY: CPT

## 2019-10-14 PROCEDURE — 99285 EMERGENCY DEPT VISIT HI MDM: CPT | Mod: ,,, | Performed by: EMERGENCY MEDICINE

## 2019-10-14 PROCEDURE — 80074 ACUTE HEPATITIS PANEL: CPT

## 2019-10-14 PROCEDURE — 99285 PR EMERGENCY DEPT VISIT,LEVEL V: ICD-10-PCS | Mod: ,,, | Performed by: EMERGENCY MEDICINE

## 2019-10-14 PROCEDURE — 99220 PR INITIAL OBSERVATION CARE,LEVL III: ICD-10-PCS | Mod: ,,, | Performed by: PHYSICIAN ASSISTANT

## 2019-10-14 PROCEDURE — 80329 ANALGESICS NON-OPIOID 1 OR 2: CPT

## 2019-10-14 PROCEDURE — 99220 PR INITIAL OBSERVATION CARE,LEVL III: CPT | Mod: ,,, | Performed by: PHYSICIAN ASSISTANT

## 2019-10-14 PROCEDURE — 82962 GLUCOSE BLOOD TEST: CPT

## 2019-10-14 PROCEDURE — 96360 HYDRATION IV INFUSION INIT: CPT

## 2019-10-14 PROCEDURE — 85025 COMPLETE CBC W/AUTO DIFF WBC: CPT

## 2019-10-14 PROCEDURE — 11000001 HC ACUTE MED/SURG PRIVATE ROOM

## 2019-10-14 PROCEDURE — 99285 EMERGENCY DEPT VISIT HI MDM: CPT | Mod: 25

## 2019-10-14 PROCEDURE — 85610 PROTHROMBIN TIME: CPT

## 2019-10-14 PROCEDURE — 83690 ASSAY OF LIPASE: CPT

## 2019-10-14 PROCEDURE — 63600175 PHARM REV CODE 636 W HCPCS: Performed by: EMERGENCY MEDICINE

## 2019-10-14 RX ORDER — AMOXICILLIN 250 MG
1 CAPSULE ORAL 2 TIMES DAILY
Status: DISCONTINUED | OUTPATIENT
Start: 2019-10-15 | End: 2019-10-17

## 2019-10-14 RX ORDER — FOLIC ACID 1 MG/1
1 TABLET ORAL DAILY
Status: DISCONTINUED | OUTPATIENT
Start: 2019-10-15 | End: 2019-10-17

## 2019-10-14 RX ORDER — IBUPROFEN 200 MG
16 TABLET ORAL
Status: DISCONTINUED | OUTPATIENT
Start: 2019-10-15 | End: 2019-10-18 | Stop reason: HOSPADM

## 2019-10-14 RX ORDER — ACETAMINOPHEN 325 MG/1
650 TABLET ORAL EVERY 4 HOURS PRN
Status: DISCONTINUED | OUTPATIENT
Start: 2019-10-15 | End: 2019-10-16

## 2019-10-14 RX ORDER — THIAMINE HCL 100 MG
100 TABLET ORAL DAILY
Status: DISCONTINUED | OUTPATIENT
Start: 2019-10-15 | End: 2019-10-17

## 2019-10-14 RX ORDER — LORAZEPAM 0.5 MG/1
2 TABLET ORAL EVERY 4 HOURS PRN
Status: DISCONTINUED | OUTPATIENT
Start: 2019-10-15 | End: 2019-10-18 | Stop reason: HOSPADM

## 2019-10-14 RX ORDER — IBUPROFEN 200 MG
24 TABLET ORAL
Status: DISCONTINUED | OUTPATIENT
Start: 2019-10-15 | End: 2019-10-18 | Stop reason: HOSPADM

## 2019-10-14 RX ORDER — GLUCAGON 1 MG
1 KIT INJECTION
Status: DISCONTINUED | OUTPATIENT
Start: 2019-10-15 | End: 2019-10-18 | Stop reason: HOSPADM

## 2019-10-14 RX ORDER — IPRATROPIUM BROMIDE AND ALBUTEROL SULFATE 2.5; .5 MG/3ML; MG/3ML
3 SOLUTION RESPIRATORY (INHALATION) EVERY 4 HOURS PRN
Status: DISCONTINUED | OUTPATIENT
Start: 2019-10-15 | End: 2019-10-18 | Stop reason: HOSPADM

## 2019-10-14 RX ORDER — RAMELTEON 8 MG/1
8 TABLET ORAL NIGHTLY PRN
Status: DISCONTINUED | OUTPATIENT
Start: 2019-10-15 | End: 2019-10-18 | Stop reason: HOSPADM

## 2019-10-14 RX ORDER — SODIUM CHLORIDE 0.9 % (FLUSH) 0.9 %
10 SYRINGE (ML) INJECTION
Status: DISCONTINUED | OUTPATIENT
Start: 2019-10-14 | End: 2019-10-18 | Stop reason: HOSPADM

## 2019-10-14 RX ORDER — ONDANSETRON 8 MG/1
8 TABLET, ORALLY DISINTEGRATING ORAL EVERY 8 HOURS PRN
Status: DISCONTINUED | OUTPATIENT
Start: 2019-10-15 | End: 2019-10-18 | Stop reason: HOSPADM

## 2019-10-14 RX ORDER — PROMETHAZINE HYDROCHLORIDE 25 MG/1
25 TABLET ORAL EVERY 6 HOURS PRN
Status: DISCONTINUED | OUTPATIENT
Start: 2019-10-15 | End: 2019-10-18 | Stop reason: HOSPADM

## 2019-10-14 RX ORDER — SODIUM CHLORIDE 0.9 % (FLUSH) 0.9 %
5 SYRINGE (ML) INJECTION
Status: DISCONTINUED | OUTPATIENT
Start: 2019-10-15 | End: 2019-10-18 | Stop reason: HOSPADM

## 2019-10-14 RX ADMIN — SODIUM CHLORIDE 1000 ML: 0.9 INJECTION, SOLUTION INTRAVENOUS at 10:10

## 2019-10-15 PROBLEM — M25.512 LEFT SHOULDER PAIN: Status: ACTIVE | Noted: 2019-10-15

## 2019-10-15 LAB
ALBUMIN SERPL BCP-MCNC: 3.3 G/DL (ref 3.5–5.2)
ALP SERPL-CCNC: 170 U/L (ref 55–135)
ALT SERPL W/O P-5'-P-CCNC: 595 U/L (ref 10–44)
AMPHET+METHAMPHET UR QL: NEGATIVE
ANION GAP SERPL CALC-SCNC: 12 MMOL/L (ref 8–16)
AST SERPL-CCNC: 441 U/L (ref 10–40)
BARBITURATES UR QL SCN>200 NG/ML: NEGATIVE
BASOPHILS # BLD AUTO: 0.06 K/UL (ref 0–0.2)
BASOPHILS NFR BLD: 1.3 % (ref 0–1.9)
BENZODIAZ UR QL SCN>200 NG/ML: ABNORMAL
BILIRUB SERPL-MCNC: 0.4 MG/DL (ref 0.1–1)
BILIRUB UR QL STRIP: NEGATIVE
BUN SERPL-MCNC: 8 MG/DL (ref 6–20)
BZE UR QL SCN: NEGATIVE
CALCIUM SERPL-MCNC: 8 MG/DL (ref 8.7–10.5)
CANNABINOIDS UR QL SCN: NEGATIVE
CHLORIDE SERPL-SCNC: 109 MMOL/L (ref 95–110)
CLARITY UR REFRACT.AUTO: CLEAR
CO2 SERPL-SCNC: 22 MMOL/L (ref 23–29)
COLOR UR AUTO: YELLOW
CREAT SERPL-MCNC: 0.7 MG/DL (ref 0.5–1.4)
CREAT UR-MCNC: 141 MG/DL (ref 23–375)
DIFFERENTIAL METHOD: ABNORMAL
EOSINOPHIL # BLD AUTO: 0.2 K/UL (ref 0–0.5)
EOSINOPHIL NFR BLD: 4 % (ref 0–8)
ERYTHROCYTE [DISTWIDTH] IN BLOOD BY AUTOMATED COUNT: 13.8 % (ref 11.5–14.5)
EST. GFR  (AFRICAN AMERICAN): >60 ML/MIN/1.73 M^2
EST. GFR  (NON AFRICAN AMERICAN): >60 ML/MIN/1.73 M^2
ESTIMATED AVG GLUCOSE: 108 MG/DL (ref 68–131)
ETHANOL UR-MCNC: 113 MG/DL
GLUCOSE SERPL-MCNC: 83 MG/DL (ref 70–110)
GLUCOSE UR QL STRIP: NEGATIVE
HAV IGM SERPL QL IA: NEGATIVE
HBA1C MFR BLD HPLC: 5.4 % (ref 4–5.6)
HBV CORE IGM SERPL QL IA: NEGATIVE
HBV SURFACE AG SERPL QL IA: NEGATIVE
HCT VFR BLD AUTO: 42.6 % (ref 40–54)
HCV AB SERPL QL IA: NEGATIVE
HGB BLD-MCNC: 13.6 G/DL (ref 14–18)
HGB UR QL STRIP: NEGATIVE
IMM GRANULOCYTES # BLD AUTO: 0.03 K/UL (ref 0–0.04)
IMM GRANULOCYTES NFR BLD AUTO: 0.7 % (ref 0–0.5)
KETONES UR QL STRIP: ABNORMAL
LEUKOCYTE ESTERASE UR QL STRIP: NEGATIVE
LYMPHOCYTES # BLD AUTO: 1.9 K/UL (ref 1–4.8)
LYMPHOCYTES NFR BLD: 42.4 % (ref 18–48)
MAGNESIUM SERPL-MCNC: 2.2 MG/DL (ref 1.6–2.6)
MCH RBC QN AUTO: 29.6 PG (ref 27–31)
MCHC RBC AUTO-ENTMCNC: 31.9 G/DL (ref 32–36)
MCV RBC AUTO: 93 FL (ref 82–98)
METHADONE UR QL SCN>300 NG/ML: NEGATIVE
MICROSCOPIC COMMENT: NORMAL
MONOCYTES # BLD AUTO: 0.5 K/UL (ref 0.3–1)
MONOCYTES NFR BLD: 11.6 % (ref 4–15)
NEUTROPHILS # BLD AUTO: 1.8 K/UL (ref 1.8–7.7)
NEUTROPHILS NFR BLD: 40 % (ref 38–73)
NITRITE UR QL STRIP: NEGATIVE
NRBC BLD-RTO: 0 /100 WBC
OPIATES UR QL SCN: NEGATIVE
PCP UR QL SCN>25 NG/ML: NEGATIVE
PH UR STRIP: 6 [PH] (ref 5–8)
PHOSPHATE SERPL-MCNC: 2.7 MG/DL (ref 2.7–4.5)
PLATELET # BLD AUTO: 149 K/UL (ref 150–350)
PMV BLD AUTO: 9.2 FL (ref 9.2–12.9)
POTASSIUM SERPL-SCNC: 4 MMOL/L (ref 3.5–5.1)
PROT SERPL-MCNC: 6.4 G/DL (ref 6–8.4)
PROT UR QL STRIP: NEGATIVE
RBC # BLD AUTO: 4.6 M/UL (ref 4.6–6.2)
RBC #/AREA URNS AUTO: 0 /HPF (ref 0–4)
SODIUM SERPL-SCNC: 143 MMOL/L (ref 136–145)
SP GR UR STRIP: 1.02 (ref 1–1.03)
TOXICOLOGY INFORMATION: ABNORMAL
URN SPEC COLLECT METH UR: ABNORMAL
WBC # BLD AUTO: 4.55 K/UL (ref 3.9–12.7)
WBC #/AREA URNS AUTO: 1 /HPF (ref 0–5)

## 2019-10-15 PROCEDURE — 11000001 HC ACUTE MED/SURG PRIVATE ROOM

## 2019-10-15 PROCEDURE — 25000003 PHARM REV CODE 250: Performed by: INTERNAL MEDICINE

## 2019-10-15 PROCEDURE — 83735 ASSAY OF MAGNESIUM: CPT

## 2019-10-15 PROCEDURE — 83036 HEMOGLOBIN GLYCOSYLATED A1C: CPT

## 2019-10-15 PROCEDURE — 80053 COMPREHEN METABOLIC PANEL: CPT

## 2019-10-15 PROCEDURE — G0378 HOSPITAL OBSERVATION PER HR: HCPCS

## 2019-10-15 PROCEDURE — 25000003 PHARM REV CODE 250: Performed by: PHYSICIAN ASSISTANT

## 2019-10-15 PROCEDURE — 81001 URINALYSIS AUTO W/SCOPE: CPT

## 2019-10-15 PROCEDURE — 85025 COMPLETE CBC W/AUTO DIFF WBC: CPT

## 2019-10-15 PROCEDURE — S4991 NICOTINE PATCH NONLEGEND: HCPCS | Performed by: PHYSICIAN ASSISTANT

## 2019-10-15 PROCEDURE — 84100 ASSAY OF PHOSPHORUS: CPT

## 2019-10-15 PROCEDURE — 80307 DRUG TEST PRSMV CHEM ANLYZR: CPT

## 2019-10-15 PROCEDURE — 36415 COLL VENOUS BLD VENIPUNCTURE: CPT

## 2019-10-15 PROCEDURE — 63600175 PHARM REV CODE 636 W HCPCS: Performed by: PHYSICIAN ASSISTANT

## 2019-10-15 RX ORDER — DIAZEPAM 5 MG/1
5 TABLET ORAL EVERY 12 HOURS
Status: DISCONTINUED | OUTPATIENT
Start: 2019-10-17 | End: 2019-10-17

## 2019-10-15 RX ORDER — DIAZEPAM 5 MG/1
10 TABLET ORAL EVERY 8 HOURS
Status: DISCONTINUED | OUTPATIENT
Start: 2019-10-15 | End: 2019-10-15

## 2019-10-15 RX ORDER — DIAZEPAM 5 MG/1
5 TABLET ORAL EVERY 12 HOURS
Status: DISCONTINUED | OUTPATIENT
Start: 2019-10-18 | End: 2019-10-17

## 2019-10-15 RX ORDER — DIAZEPAM 5 MG/1
10 TABLET ORAL EVERY 8 HOURS
Status: COMPLETED | OUTPATIENT
Start: 2019-10-15 | End: 2019-10-15

## 2019-10-15 RX ORDER — DIAZEPAM 5 MG/1
10 TABLET ORAL EVERY 12 HOURS
Status: COMPLETED | OUTPATIENT
Start: 2019-10-16 | End: 2019-10-16

## 2019-10-15 RX ORDER — IBUPROFEN 200 MG
1 TABLET ORAL DAILY
Status: DISCONTINUED | OUTPATIENT
Start: 2019-10-15 | End: 2019-10-18 | Stop reason: HOSPADM

## 2019-10-15 RX ORDER — ENOXAPARIN SODIUM 100 MG/ML
40 INJECTION SUBCUTANEOUS EVERY 24 HOURS
Status: DISCONTINUED | OUTPATIENT
Start: 2019-10-16 | End: 2019-10-16

## 2019-10-15 RX ORDER — KETOROLAC TROMETHAMINE 30 MG/ML
15 INJECTION, SOLUTION INTRAMUSCULAR; INTRAVENOUS EVERY 6 HOURS PRN
Status: DISPENSED | OUTPATIENT
Start: 2019-10-15 | End: 2019-10-18

## 2019-10-15 RX ADMIN — NICOTINE 1 PATCH: 21 PATCH, EXTENDED RELEASE TRANSDERMAL at 03:10

## 2019-10-15 RX ADMIN — DIAZEPAM 10 MG: 5 TABLET ORAL at 01:10

## 2019-10-15 RX ADMIN — THERA TABS 1 TABLET: TAB at 08:10

## 2019-10-15 RX ADMIN — FOLIC ACID 1 MG: 1 TABLET ORAL at 08:10

## 2019-10-15 RX ADMIN — RAMELTEON 8 MG: 8 TABLET ORAL at 09:10

## 2019-10-15 RX ADMIN — Medication 100 MG: at 08:10

## 2019-10-15 RX ADMIN — MENTHOL, METHYL SALICYLATE: 10; 15 CREAM TOPICAL at 09:10

## 2019-10-15 RX ADMIN — DIAZEPAM 10 MG: 5 TABLET ORAL at 09:10

## 2019-10-15 RX ADMIN — KETOROLAC TROMETHAMINE 15 MG: 30 INJECTION, SOLUTION INTRAMUSCULAR at 02:10

## 2019-10-15 RX ADMIN — KETOROLAC TROMETHAMINE 15 MG: 30 INJECTION, SOLUTION INTRAMUSCULAR at 01:10

## 2019-10-15 RX ADMIN — ACETAMINOPHEN 650 MG: 325 TABLET ORAL at 12:10

## 2019-10-15 RX ADMIN — SENNOSIDES, DOCUSATE SODIUM 1 TABLET: 50; 8.6 TABLET, FILM COATED ORAL at 08:10

## 2019-10-15 RX ADMIN — MENTHOL, METHYL SALICYLATE: 10; 15 CREAM TOPICAL at 04:10

## 2019-10-15 RX ADMIN — KETOROLAC TROMETHAMINE 15 MG: 30 INJECTION, SOLUTION INTRAMUSCULAR at 08:10

## 2019-10-15 RX ADMIN — DIAZEPAM 10 MG: 5 TABLET ORAL at 03:10

## 2019-10-15 NOTE — PLAN OF CARE
10/15/19 0924   Post-Acute Status   Post-Acute Authorization Other   Other Status No Post-Acute Service Needs     May need shelter at d.c

## 2019-10-15 NOTE — PLAN OF CARE
Problem: Fall Injury Risk  Goal: Absence of Fall and Fall-Related Injury  Outcome: Ongoing, Progressing     Problem: Adult Inpatient Plan of Care  Goal: Plan of Care Review  Outcome: Ongoing, Progressing  Goal: Patient-Specific Goal (Individualization)  Outcome: Ongoing, Progressing  Goal: Absence of Hospital-Acquired Illness or Injury  Outcome: Ongoing, Progressing  Goal: Optimal Comfort and Wellbeing  Outcome: Ongoing, Progressing  Goal: Readiness for Transition of Care  Outcome: Ongoing, Progressing  Goal: Rounds/Family Conference  Outcome: Ongoing, Progressing     Problem: Infection  Goal: Infection Symptom Resolution  Outcome: Ongoing, Progressing     Problem: Alcohol Withdrawal  Goal: Alcohol Withdrawal Symptom Control  Outcome: Ongoing, Progressing     Pt free of falls/trauma/injuries. Bed in low position, wheels locked, and call light within reach. Skin integrity remains unchanged. CIWA assessed as ordered. Complaints of pain managed with prn medication and heat packs. VSS and afebrile. No distress noted/reported at this time. Will continue to monitor.

## 2019-10-15 NOTE — ASSESSMENT & PLAN NOTE
Alcohol use disorder, severe, dependence  Transaminitis    - ETOH 291.  - , .  - Hepatitis panel ordered.  - Patient NPO for abdominal US.  - Will check Utox.  - Monitor CIWA score.  - Thiamine, folic acid, multi-vitamin.  - Diazepam 10mg q8hrs, PRN Ativan for withdrawals.  - Reports desire to go to [facility].  Will consult FADIA

## 2019-10-15 NOTE — PLAN OF CARE
[id number] - [place], [place] - [address]  [address]  [place] [address]  Phone: [phone] Fax: [phone]    Payor: MEDICAID / Plan: [place] / Product Type: Managed Medicaid /       10/15/19 0955   Discharge Assessment   Assessment Type Discharge Planning Assessment   Confirmed/corrected address and phone number on facesheet? Yes   Assessment information obtained from? Patient   Expected Length of Stay (days) 4   Prior to hospitilization cognitive status: Alert/Oriented   Prior to hospitalization functional status: Independent   Current cognitive status: Alert/Oriented   Current Functional Status: Independent   Lives With other (see comments)  (Per patient he homeless and will be going into a detox program upon discharge.)   Able to Return to Prior Arrangements yes   Is patient able to care for self after discharge? Yes   Who are your caregiver(s) and their phone number(s)? No contacts to give   Patient's perception of discharge disposition other (comments)  (Detox facility)   Readmission Within the Last 30 Days current reason for admission unrelated to previous admission   If yes, most recent facility name: [facility]   Patient currently being followed by outpatient case management? No   Patient currently receives any other outside agency services? No   Equipment Currently Used at Home none   Do you have any problems affording any of your prescribed medications? No   Is the patient taking medications as prescribed? yes   Does the patient have transportation home? No  (Patient will need transportation set up by [facility].)   Dialysis Name and Scheduled days N/A   Does the patient receive services at the [facility]? No   Discharge Plan A Other  (Detox facility)   Discharge Plan B Shelter   DME Needed Upon Discharge    (TBD)   Patient/Family in Agreement with Plan yes
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
## 2019-10-15 NOTE — ED NOTES
Ultrasound called to notify to keep patient NPO starting at midnight. Must be NPO for approx 8 hours for ultrasound of the gallbladder to be performed.

## 2019-10-15 NOTE — PLAN OF CARE
Problem: Fall Injury Risk  Goal: Absence of Fall and Fall-Related Injury  Outcome: Ongoing, Progressing     Problem: Fall Injury Risk  Goal: Absence of Fall and Fall-Related Injury  Outcome: Ongoing, Progressing    CIWA scoring in progress. Standing valium administered as ordered to manage withdrawal symptoms. Pt c/o left shoulder pain, limited ROM noted to LUE. PRN Toradol administered as ordered. Pt educated on NPO status, pt states understanding. Monitoring pt's vitals. Q4 neuro assessment in progress. Visi in place.

## 2019-10-15 NOTE — SUBJECTIVE & OBJECTIVE
"Past Medical History:   Diagnosis Date    Alcohol abuse     Depression     History of psychiatric hospitalization     Hx of psychiatric care     Psychiatric exam requested by authority     Psychiatric problem     Suicide attempt     Therapy     Withdrawal symptoms, alcohol        History reviewed. No pertinent surgical history.    Review of patient's allergies indicates:  No Known Allergies    No current facility-administered medications on file prior to encounter.      Current Outpatient Medications on File Prior to Encounter   Medication Sig    diazePAM (VALIUM) 5 MG tablet Take 1 tablet (5 mg total) by mouth once. for 1 dose    naproxen (NAPROSYN) 500 MG tablet Take 1 tablet (500 mg total) by mouth 2 (two) times daily with meals.     Family History     Problem Relation (Age of Onset)    Alcohol abuse Paternal Grandmother    No Known Problems Mother, Father        Tobacco Use    Smoking status: Current Every Day Smoker     Packs/day: 1.00     Years: 30.00     Pack years: 30.00     Types: Cigarettes    Smokeless tobacco: Never Used   Substance and Sexual Activity    Alcohol use: Yes     Comment: drinkl " alot everyday- wine beer whatever I get my hands on"    Drug use: Yes     Types: Cocaine, Marijuana     Comment: "In the past, but not recently"    Sexual activity: Yes     Partners: Female     Review of Systems   Constitutional: Negative for activity change, appetite change, chills, diaphoresis, fatigue, fever and unexpected weight change.   HENT: Negative for congestion, rhinorrhea, sore throat, trouble swallowing and voice change.    Eyes: Negative for visual disturbance.   Respiratory: Negative for cough, choking, chest tightness, shortness of breath and wheezing.    Cardiovascular: Negative for chest pain, palpitations and leg swelling.   Gastrointestinal: Negative for abdominal distention, abdominal pain, anal bleeding, blood in stool, constipation, diarrhea, nausea and vomiting.   Endocrine: " Negative for cold intolerance, heat intolerance, polydipsia and polyuria.   Genitourinary: Negative for dysuria, flank pain, frequency, hematuria and urgency.   Musculoskeletal: Positive for arthralgias. Negative for back pain, joint swelling and myalgias.   Skin: Negative for color change and rash.   Neurological: Negative for dizziness, seizures, syncope, facial asymmetry, speech difficulty, weakness, light-headedness, numbness and headaches.   Hematological: Negative for adenopathy. Does not bruise/bleed easily.   Psychiatric/Behavioral: Negative for agitation, confusion, hallucinations and suicidal ideas.     Objective:     Vital Signs (Most Recent):  Temp: 97.9 °F (36.6 °C) (10/14/19 2114)  Pulse: 95 (10/14/19 2326)  Resp: 16 (10/14/19 2326)  BP: 117/66 (10/14/19 2326)  SpO2: 95 % (10/14/19 2326) Vital Signs (24h Range):  Temp:  [97.9 °F (36.6 °C)-98.8 °F (37.1 °C)] 97.9 °F (36.6 °C)  Pulse:  [94-95] 95  Resp:  [16-18] 16  SpO2:  [95 %-98 %] 95 %  BP: (117-124)/(66-80) 117/66     Weight: 81.6 kg (180 lb)  Body mass index is 25.1 kg/m².    Physical Exam   Constitutional: He is oriented to person, place, and time. He appears well-developed and well-nourished. He appears lethargic. He is cooperative. No distress.   HENT:   Head: Normocephalic and atraumatic.   Eyes: Pupils are equal, round, and reactive to light.   Neck: Neck supple. Carotid bruit is not present. No thyromegaly present.   Cardiovascular: Normal rate and regular rhythm. Exam reveals no gallop.   No murmur heard.  Pulmonary/Chest: Effort normal and breath sounds normal. No respiratory distress. He has no wheezes.   Abdominal: Bowel sounds are normal. He exhibits no distension. There is no splenomegaly or hepatomegaly. There is no tenderness.   Musculoskeletal: Normal range of motion. He exhibits no edema.   Neurological: He is oriented to person, place, and time. He appears lethargic. No cranial nerve deficit or sensory deficit.   Skin: Skin is warm  and dry. No rash noted.   Psychiatric: He has a normal mood and affect. His behavior is normal. His speech is slurred.         CRANIAL NERVES     CN III, IV, VI   Pupils are equal, round, and reactive to light.       Significant Labs:   CBC:   Recent Labs   Lab 10/14/19  2108   WBC 8.33   HGB 15.5   HCT 46.7        CMP:   Recent Labs   Lab 10/14/19  2108      K 4.0      CO2 25   GLU 84   BUN 8   CREATININE 1.0   CALCIUM 8.7   PROT 7.7   ALBUMIN 3.9   BILITOT 0.4   ALKPHOS 194*   *   *   ANIONGAP 12   EGFRNONAA >60.0     Coagulation:   Recent Labs   Lab 10/14/19  2227   INR 1.0     Urine Studies: No results for input(s): COLORU, APPEARANCEUA, PHUR, SPECGRAV, PROTEINUA, GLUCUA, KETONESU, BILIRUBINUA, OCCULTUA, NITRITE, UROBILINOGEN, LEUKOCYTESUR, RBCUA, WBCUA, BACTERIA, SQUAMEPITHEL, HYALINECASTS in the last 48 hours.    Invalid input(s): THALIA    Significant Imaging: I have reviewed all pertinent imaging results/findings within the past 24 hours.
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
## 2019-10-15 NOTE — ED NOTES
Per EMS verbal report. Pt was found lying on sidewalk outside of hotel. Pt arrived to find patient intoxicated and unable to ambulate.

## 2019-10-15 NOTE — ED PROVIDER NOTES
"Encounter Date: 10/14/2019       History     Chief Complaint   Patient presents with    Alcohol Intoxication     44 yo M with pmhx EtOH abuse with history of withdrawal, depression, pyschiatric care presents via EMS with a chief complaint of found down.  EMS was called by a [employer] who found the patient sleeping on the grass in front of a hotel.  Patient is significantly intoxicated and is unable to cooperate with a history.  Upon chart review, patient has a history of multiple, frequent ED presentations for acute alcohol intoxication.        Review of patient's allergies indicates:  No Known Allergies  Past Medical History:   Diagnosis Date    Alcohol abuse     Depression     History of psychiatric hospitalization     Hx of psychiatric care     Psychiatric exam requested by authority     Psychiatric problem     Suicide attempt     Therapy     Withdrawal symptoms, alcohol      History reviewed. No pertinent surgical history.  Family History   Problem Relation Age of Onset    No Known Problems Mother     No Known Problems Father     Alcohol abuse Paternal Grandmother      Social History     Tobacco Use    Smoking status: Current Every Day Smoker     Packs/day: 1.00     Years: 30.00     Pack years: 30.00     Types: Cigarettes    Smokeless tobacco: Never Used   Substance Use Topics    Alcohol use: Yes     Comment: drinkl " alot everyday- wine beer whatever I get my hands on"    Drug use: Yes     Types: Cocaine, Marijuana     Comment: "In the past, but not recently"     Review of Systems   Unable to perform ROS: Patient nonverbal       Physical Exam     Initial Vitals [10/14/19 1729]   BP Pulse Resp Temp SpO2   120/80 94 16 98.8 °F (37.1 °C) 98 %      MAP       --         Physical Exam    Nursing note and vitals reviewed.  Constitutional: He appears well-nourished. He is not diaphoretic.   Disheveled, smells of EtOH  Sleeping in stretcher   HENT:   Head: Normocephalic and atraumatic.   No " bruising, step offs, or apparent evidence of trauma   Eyes: Conjunctivae are normal.   Neck: Normal range of motion. Neck supple. No JVD present.   No cervical tenderness or stepoffs   Cardiovascular: Normal rate, regular rhythm, normal heart sounds and intact distal pulses. Exam reveals no gallop and no friction rub.    No murmur heard.  Pulmonary/Chest: Breath sounds normal. No respiratory distress. He has no wheezes. He has no rhonchi. He has no rales. He exhibits no tenderness.   Abdominal: Soft. Bowel sounds are normal. He exhibits no distension and no mass. There is no tenderness. There is no rebound and no guarding.   Musculoskeletal: Normal range of motion. He exhibits no tenderness.   Lymphadenopathy:     He has no cervical adenopathy.   Neurological: GCS eye subscore is 2. GCS verbal subscore is 2. GCS motor subscore is 4.   Briefly arousable to pain but quickly falls back asleep, incomprehensive verbal response, does not follow commands, gag intact     Skin: Skin is warm and dry. Capillary refill takes less than 2 seconds.         ED Course   Procedures  Labs Reviewed   CBC W/ AUTO DIFFERENTIAL - Abnormal; Notable for the following components:       Result Value    MPV 9.1 (*)     Immature Granulocytes 0.8 (*)     Immature Grans (Abs) 0.07 (*)     All other components within normal limits   COMPREHENSIVE METABOLIC PANEL - Abnormal; Notable for the following components:    Alkaline Phosphatase 194 (*)      (*)      (*)     All other components within normal limits   ALCOHOL,MEDICAL (ETHANOL)   LIPASE   PROTIME-INR   HEPATITIS PANEL, ACUTE   ACETAMINOPHEN LEVEL   RAPID HIV          Imaging Results          CT Head Without Contrast (Final result)  Result time 10/14/19 20:35:29    Final result by Eduard Haq MD (10/14/19 20:35:29)                 Impression:      No detrimental change or acute intracranial abnormality identified.      Electronically signed by: Eduard Haq  MD  Date:    10/14/2019  Time:    20:35             Narrative:    EXAMINATION:  CT HEAD WITHOUT CONTRAST    CLINICAL HISTORY:  Confusion/delirium, altered LOC, unexplained;    TECHNIQUE:  Low dose axial CT images obtained throughout the head without intravenous contrast. Sagittal and coronal reconstructions were performed.    COMPARISON:  Head CT 04/22/2016    FINDINGS:  Intracranial compartment:    Ventricles and sulci are normal in size for age without evidence of hydrocephalus. No extra-axial blood or fluid collections.    The brain parenchyma appears normal. No parenchymal mass, hemorrhage, edema or major vascular distribution infarct.    Skull/extracranial contents (limited evaluation): No fracture. Mastoid air cells and paranasal sinuses are essentially clear.  Imaged portions of the orbits are within normal limits.                                 Medical Decision Making:   History:   I obtained history from: EMS provider.  Old Medical Records: I decided to obtain old medical records.  Initial Assessment:   42 yo M with pmhx EtOH abuse with history of withdrawal, depression, pyschiatric care presents via EMS with a chief complaint of found down.   Differential Diagnosis:   Acute alcohol intoxication, ICH, electrolyte abnormalities, hypoglycemia  Clinical Tests:   Lab Tests: Ordered  Radiological Study: Ordered  ED Management:  Will obtain labs and obtain CT head.  Patient has a depressed GCS of 8 but appears to be protecting his airway at this time.  Will continue to monitor closely.    Reassessment:  CT head without acute process.  On repeat assessment, patient is awake and interactive.  He reports that he drank a significant amount of alcohol.  He wishes to get sober.  He wants to go to the [facility] alcohol rehab program.  He suffers from homelessness.  His speech is somewhat slurred but he is oriented. Unsteady gait.  CBC normal. CMP reveals acute hepatitis with AST of 502, .  Possibly  alcoholic but ALT is greater.  Will obtain hepatitis panel.  Additional labs.  Obs per case management.                       Clinical Impression:       ICD-10-CM ICD-9-CM   1. Alcoholic intoxication without complication F10.920 305.00   2. Hepatitis K75.9 573.3         Disposition:   Disposition: Placed in Observation                        Hector Boone MD  10/14/19 2116       Hector Boone MD  10/14/19 2144       Hector Boone MD  10/14/19 2212

## 2019-10-15 NOTE — ED NOTES
Pt sleeping on the stretcher with side rails X2.  Bed in locked locked position and no needs at this time.  Will continue to monitor and assess.

## 2019-10-15 NOTE — ED NOTES
"MD Boone at bedside to evaluate pt.  Pt states that he is trying to "get off alcohol" and get into [facility] outpt rehab.  Pt was able to demonstrate walking for the MD but will remain here until he "gita" up and able to safely discharge.   "

## 2019-10-15 NOTE — ED NOTES
Patient identifiers verified and correct for Gilmar Clemente.    LOC: The patient is awake, alert and aware of environment with an appropriate affect, the patient is oriented x 3 and speaking appropriately.  APPEARANCE: Patient resting comfortably and in no acute distress, patient is clean and well groomed, patient's clothing is properly fastened.  SKIN: The skin is warm and dry, color consistent with ethnicity, patient has normal skin turgor and moist mucus membranes, skin intact, no breakdown or bruising noted.  MUSCULOSKELETAL: Patient moving all extremities spontaneously, no obvious swelling or deformities noted.  RESPIRATORY: Airway is open and patent, respirations are spontaneous, patient has a normal effort and rate, no accessory muscle use noted, bilateral breath sounds clear and present.  CARDIAC: Patient has a normal rate and regular rhythm, no periphreal edema noted, capillary refill < 3 seconds.  ABDOMEN: Soft and non tender to palpation, no distention noted, normoactive bowel sounds present in all four quadrants.  NEUROLOGIC: PERRL, 3 mm bilaterally, eyes open spontaneously, behavior appropriate to situation, follows commands, facial expression symmetrical, bilateral hand grasp equal and even, purposeful motor response noted, normal sensation in all extremities when touched with a finger.

## 2019-10-16 PROBLEM — S36.119A LIVER INJURY: Status: ACTIVE | Noted: 2019-10-16

## 2019-10-16 PROBLEM — M54.9 MUSCULOSKELETAL BACK PAIN: Status: ACTIVE | Noted: 2019-10-16

## 2019-10-16 LAB
ALBUMIN SERPL BCP-MCNC: 3.4 G/DL (ref 3.5–5.2)
ALP SERPL-CCNC: 179 U/L (ref 55–135)
ALT SERPL W/O P-5'-P-CCNC: 462 U/L (ref 10–44)
ANION GAP SERPL CALC-SCNC: 9 MMOL/L (ref 8–16)
AST SERPL-CCNC: 226 U/L (ref 10–40)
BASOPHILS # BLD AUTO: 0.07 K/UL (ref 0–0.2)
BASOPHILS NFR BLD: 1.7 % (ref 0–1.9)
BILIRUB SERPL-MCNC: 0.6 MG/DL (ref 0.1–1)
BUN SERPL-MCNC: 7 MG/DL (ref 6–20)
CALCIUM SERPL-MCNC: 8.4 MG/DL (ref 8.7–10.5)
CHLORIDE SERPL-SCNC: 104 MMOL/L (ref 95–110)
CO2 SERPL-SCNC: 24 MMOL/L (ref 23–29)
CREAT SERPL-MCNC: 0.8 MG/DL (ref 0.5–1.4)
DIFFERENTIAL METHOD: ABNORMAL
EOSINOPHIL # BLD AUTO: 0.2 K/UL (ref 0–0.5)
EOSINOPHIL NFR BLD: 5.9 % (ref 0–8)
ERYTHROCYTE [DISTWIDTH] IN BLOOD BY AUTOMATED COUNT: 13.2 % (ref 11.5–14.5)
EST. GFR  (AFRICAN AMERICAN): >60 ML/MIN/1.73 M^2
EST. GFR  (NON AFRICAN AMERICAN): >60 ML/MIN/1.73 M^2
GLUCOSE SERPL-MCNC: 95 MG/DL (ref 70–110)
HCT VFR BLD AUTO: 43.6 % (ref 40–54)
HGB BLD-MCNC: 14.1 G/DL (ref 14–18)
IMM GRANULOCYTES # BLD AUTO: 0.03 K/UL (ref 0–0.04)
IMM GRANULOCYTES NFR BLD AUTO: 0.7 % (ref 0–0.5)
LYMPHOCYTES # BLD AUTO: 1.6 K/UL (ref 1–4.8)
LYMPHOCYTES NFR BLD: 38.4 % (ref 18–48)
MCH RBC QN AUTO: 29.5 PG (ref 27–31)
MCHC RBC AUTO-ENTMCNC: 32.3 G/DL (ref 32–36)
MCV RBC AUTO: 91 FL (ref 82–98)
MONOCYTES # BLD AUTO: 0.6 K/UL (ref 0.3–1)
MONOCYTES NFR BLD: 15 % (ref 4–15)
NEUTROPHILS # BLD AUTO: 1.6 K/UL (ref 1.8–7.7)
NEUTROPHILS NFR BLD: 38.3 % (ref 38–73)
NRBC BLD-RTO: 0 /100 WBC
PLATELET # BLD AUTO: 170 K/UL (ref 150–350)
PMV BLD AUTO: 9.7 FL (ref 9.2–12.9)
POTASSIUM SERPL-SCNC: 3.9 MMOL/L (ref 3.5–5.1)
PROT SERPL-MCNC: 6.7 G/DL (ref 6–8.4)
RBC # BLD AUTO: 4.78 M/UL (ref 4.6–6.2)
SODIUM SERPL-SCNC: 137 MMOL/L (ref 136–145)
WBC # BLD AUTO: 4.06 K/UL (ref 3.9–12.7)

## 2019-10-16 PROCEDURE — 63600175 PHARM REV CODE 636 W HCPCS: Performed by: INTERNAL MEDICINE

## 2019-10-16 PROCEDURE — 99232 SBSQ HOSP IP/OBS MODERATE 35: CPT | Mod: ,,, | Performed by: INTERNAL MEDICINE

## 2019-10-16 PROCEDURE — 63600175 PHARM REV CODE 636 W HCPCS: Performed by: PHYSICIAN ASSISTANT

## 2019-10-16 PROCEDURE — 36415 COLL VENOUS BLD VENIPUNCTURE: CPT

## 2019-10-16 PROCEDURE — 80053 COMPREHEN METABOLIC PANEL: CPT

## 2019-10-16 PROCEDURE — 25000003 PHARM REV CODE 250: Performed by: PHYSICIAN ASSISTANT

## 2019-10-16 PROCEDURE — 85025 COMPLETE CBC W/AUTO DIFF WBC: CPT

## 2019-10-16 PROCEDURE — G0378 HOSPITAL OBSERVATION PER HR: HCPCS

## 2019-10-16 PROCEDURE — 25000003 PHARM REV CODE 250: Performed by: INTERNAL MEDICINE

## 2019-10-16 PROCEDURE — 11000001 HC ACUTE MED/SURG PRIVATE ROOM

## 2019-10-16 PROCEDURE — 99232 PR SUBSEQUENT HOSPITAL CARE,LEVL II: ICD-10-PCS | Mod: ,,, | Performed by: INTERNAL MEDICINE

## 2019-10-16 PROCEDURE — S4991 NICOTINE PATCH NONLEGEND: HCPCS | Performed by: PHYSICIAN ASSISTANT

## 2019-10-16 RX ORDER — CYCLOBENZAPRINE HCL 5 MG
5 TABLET ORAL 3 TIMES DAILY
Status: DISCONTINUED | OUTPATIENT
Start: 2019-10-16 | End: 2019-10-18 | Stop reason: HOSPADM

## 2019-10-16 RX ORDER — IBUPROFEN 200 MG
400 TABLET ORAL EVERY 6 HOURS
Status: DISCONTINUED | OUTPATIENT
Start: 2019-10-16 | End: 2019-10-18 | Stop reason: HOSPADM

## 2019-10-16 RX ORDER — ENOXAPARIN SODIUM 100 MG/ML
40 INJECTION SUBCUTANEOUS EVERY 24 HOURS
Status: DISCONTINUED | OUTPATIENT
Start: 2019-10-16 | End: 2019-10-17

## 2019-10-16 RX ADMIN — KETOROLAC TROMETHAMINE 15 MG: 30 INJECTION, SOLUTION INTRAMUSCULAR at 06:10

## 2019-10-16 RX ADMIN — SENNOSIDES, DOCUSATE SODIUM 1 TABLET: 50; 8.6 TABLET, FILM COATED ORAL at 08:10

## 2019-10-16 RX ADMIN — IBUPROFEN 400 MG: 200 TABLET, FILM COATED ORAL at 10:10

## 2019-10-16 RX ADMIN — THERA TABS 1 TABLET: TAB at 08:10

## 2019-10-16 RX ADMIN — Medication 100 MG: at 08:10

## 2019-10-16 RX ADMIN — CYCLOBENZAPRINE HYDROCHLORIDE 5 MG: 5 TABLET, FILM COATED ORAL at 03:10

## 2019-10-16 RX ADMIN — IBUPROFEN 400 MG: 200 TABLET, FILM COATED ORAL at 06:10

## 2019-10-16 RX ADMIN — DIAZEPAM 10 MG: 5 TABLET ORAL at 08:10

## 2019-10-16 RX ADMIN — MENTHOL, METHYL SALICYLATE: 10; 15 CREAM TOPICAL at 08:10

## 2019-10-16 RX ADMIN — KETOROLAC TROMETHAMINE 15 MG: 30 INJECTION, SOLUTION INTRAMUSCULAR at 03:10

## 2019-10-16 RX ADMIN — CYCLOBENZAPRINE HYDROCHLORIDE 5 MG: 5 TABLET, FILM COATED ORAL at 08:10

## 2019-10-16 RX ADMIN — KETOROLAC TROMETHAMINE 15 MG: 30 INJECTION, SOLUTION INTRAMUSCULAR at 09:10

## 2019-10-16 RX ADMIN — NICOTINE 1 PATCH: 21 PATCH, EXTENDED RELEASE TRANSDERMAL at 08:10

## 2019-10-16 RX ADMIN — ENOXAPARIN SODIUM 40 MG: 100 INJECTION SUBCUTANEOUS at 06:10

## 2019-10-16 RX ADMIN — MENTHOL, METHYL SALICYLATE: 10; 15 CREAM TOPICAL at 03:10

## 2019-10-16 RX ADMIN — ACETAMINOPHEN 650 MG: 325 TABLET ORAL at 08:10

## 2019-10-16 RX ADMIN — FOLIC ACID 1 MG: 1 TABLET ORAL at 08:10
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
## 2019-10-16 NOTE — SUBJECTIVE & OBJECTIVE
Interval History:     No evidence of withdrawal over night  - patient is tolerating benzodiazepine taper well and endorses no symptoms of withdrawal.  His principal issue at this point remains left-sided back pain located in the soft tissue - anti spasmodic in additional analgesia be provided.    Labs were reviewed and display ongoing resolution of alcohol-induced liver injury and are otherwise normal.    Review of Systems   Constitutional: Negative for activity change, appetite change, chills, diaphoresis, fatigue, fever and unexpected weight change.   HENT: Negative for congestion, rhinorrhea, sore throat, trouble swallowing and voice change.    Eyes: Negative for visual disturbance.   Respiratory: Negative for cough, choking, chest tightness, shortness of breath and wheezing.    Cardiovascular: Negative for chest pain, palpitations and leg swelling.   Gastrointestinal: Negative for abdominal distention, abdominal pain, anal bleeding, blood in stool, constipation, diarrhea, nausea and vomiting.   Endocrine: Negative for cold intolerance, heat intolerance, polydipsia and polyuria.   Genitourinary: Negative for dysuria, flank pain, frequency, hematuria and urgency.   Musculoskeletal: Positive for arthralgias. Negative for back pain, joint swelling and myalgias.   Skin: Negative for color change and rash.   Neurological: Negative for dizziness, seizures, syncope, facial asymmetry, speech difficulty, weakness, light-headedness, numbness and headaches.   Hematological: Negative for adenopathy. Does not bruise/bleed easily.   Psychiatric/Behavioral: Negative for agitation, confusion, hallucinations and suicidal ideas.     Objective:     Vital Signs (Most Recent):  Temp: 98 °F (36.7 °C) (10/16/19 1147)  Pulse: 77 (10/16/19 1149)  Resp: 12 (10/16/19 1149)  BP: (!) 136/91 (10/16/19 1149)  SpO2: 96 % (10/16/19 1149) Vital Signs (24h Range):  Temp:  [97.5 °F (36.4 °C)-98.3 °F (36.8 °C)] 98 °F (36.7 °C)  Pulse:  [64-84]  77  Resp:  [10-16] 12  SpO2:  [91 %-96 %] 96 %  BP: (119-140)/(80-94) 136/91     Weight: 81.6 kg (179 lb 14.3 oz)  Body mass index is 25.09 kg/m².  No intake or output data in the 24 hours ending 10/16/19 1426   Physical Exam   Constitutional: He is oriented to person, place, and time. He appears well-developed and well-nourished. He appears lethargic. He is cooperative. No distress.   HENT:   Head: Normocephalic and atraumatic.   Eyes: Pupils are equal, round, and reactive to light.   Neck: Neck supple. Carotid bruit is not present. No thyromegaly present.   Cardiovascular: Normal rate and regular rhythm. Exam reveals no gallop.   No murmur heard.  Pulmonary/Chest: Effort normal and breath sounds normal. No respiratory distress. He has no wheezes.   Abdominal: Bowel sounds are normal. He exhibits no distension. There is no splenomegaly or hepatomegaly. There is no tenderness.   Musculoskeletal: Normal range of motion. He exhibits no edema.   Neurological: He is oriented to person, place, and time. He appears lethargic. No cranial nerve deficit or sensory deficit.   Skin: Skin is warm and dry. No rash noted.   Psychiatric: He has a normal mood and affect. His behavior is normal. His speech is slurred.       Significant Labs:   CMP:   Recent Labs   Lab 10/14/19  2108 10/15/19  0638 10/16/19  0706    143 137   K 4.0 4.0 3.9    109 104   CO2 25 22* 24   GLU 84 83 95   BUN 8 8 7   CREATININE 1.0 0.7 0.8   CALCIUM 8.7 8.0* 8.4*   PROT 7.7 6.4 6.7   ALBUMIN 3.9 3.3* 3.4*   BILITOT 0.4 0.4 0.6   ALKPHOS 194* 170* 179*   * 441* 226*   * 595* 462*   ANIONGAP 12 12 9   EGFRNONAA >60.0 >60.0 >60.0       Significant Imaging: I have reviewed all pertinent imaging results/findings within the past 24 hours.

## 2019-10-16 NOTE — PLAN OF CARE
10/16/19 1017   Post-Acute Status   Post-Acute Authorization Other   Other Status No Post-Acute Service Needs

## 2019-10-16 NOTE — ASSESSMENT & PLAN NOTE
Evaluated with chest x-ray without any evidence of trauma.  Managed with anti spasmodic systemic anti inflammatories and topical analgesia.

## 2019-10-16 NOTE — ASSESSMENT & PLAN NOTE
Managed with benzodiazepine taper - Valium 10 b.i.d. on 10/16 followed by Valium 5 b.i.d. on 10/17 then by Valium 5 q.d. on 10/18.  Multivitamin, thiamine and folate are active medications as well.

## 2019-10-16 NOTE — PLAN OF CARE
Problem: Fall Injury Risk  Goal: Absence of Fall and Fall-Related Injury  Outcome: Ongoing, Progressing     Problem: Adult Inpatient Plan of Care  Goal: Plan of Care Review  Outcome: Ongoing, Progressing  Goal: Patient-Specific Goal (Individualization)  Outcome: Ongoing, Progressing  Goal: Absence of Hospital-Acquired Illness or Injury  Outcome: Ongoing, Progressing  Goal: Optimal Comfort and Wellbeing  Outcome: Ongoing, Progressing  Goal: Readiness for Transition of Care  Outcome: Ongoing, Progressing  Goal: Rounds/Family Conference  Outcome: Ongoing, Progressing     Problem: Infection  Goal: Infection Symptom Resolution  Outcome: Ongoing, Progressing     Problem: Alcohol Withdrawal  Goal: Alcohol Withdrawal Symptom Control  Outcome: Ongoing, Progressing  Pt free of falls/trauma/injuries. Bed in low position, wheels locked, and call light within reach. Skin integrity remains unchanged. Pain managed with scheduled and prn medication. CIWA assessed as ordered. No distress noted/reported at this time. Will continue to monitor.

## 2019-10-16 NOTE — HOSPITAL COURSE
Patient managed with PO benzodiazepine taper.  Tolerated well and completed taper on morning of October 18.    Patient was with a mild presentation of alcoholic hepatitis - with evidence of ongoing resolution throughout stay.  Patient did not have any other evidence of liver failure.  And AST ALT were 100 and 400 on last check down considerably from their peak.  Imaging was obtained and did not reveal any type of acute obstructive process in the right upper quadrant.  Hepatitis panel was obtained and was negative.     Evaluated for chest wall trauma with chest x-ray given consisting complaints of musculoskeletal back pain. Chest x-ray was found to be normal without any evidence of fracture.  Patient was managed with anti spasmodic analgesic and topical medication.

## 2019-10-16 NOTE — ASSESSMENT & PLAN NOTE
Alcohol induced acute liver injury present on admission - continues resolution with supportive management only.

## 2019-10-17 PROBLEM — F10.939 ALCOHOL WITHDRAWAL: Status: RESOLVED | Noted: 2019-09-30 | Resolved: 2019-10-17

## 2019-10-17 PROBLEM — M25.512 LEFT SHOULDER PAIN: Status: RESOLVED | Noted: 2019-10-15 | Resolved: 2019-10-17

## 2019-10-17 PROBLEM — F10.920 ALCOHOLIC INTOXICATION WITHOUT COMPLICATION: Status: RESOLVED | Noted: 2019-10-14 | Resolved: 2019-10-17

## 2019-10-17 PROBLEM — M54.9 MUSCULOSKELETAL BACK PAIN: Status: RESOLVED | Noted: 2019-10-16 | Resolved: 2019-10-17

## 2019-10-17 LAB
ALBUMIN SERPL BCP-MCNC: 3.9 G/DL (ref 3.5–5.2)
ALP SERPL-CCNC: 181 U/L (ref 55–135)
ALT SERPL W/O P-5'-P-CCNC: 412 U/L (ref 10–44)
ANION GAP SERPL CALC-SCNC: 10 MMOL/L (ref 8–16)
AST SERPL-CCNC: 166 U/L (ref 10–40)
BILIRUB SERPL-MCNC: 0.6 MG/DL (ref 0.1–1)
BUN SERPL-MCNC: 6 MG/DL (ref 6–20)
CALCIUM SERPL-MCNC: 9.4 MG/DL (ref 8.7–10.5)
CHLORIDE SERPL-SCNC: 105 MMOL/L (ref 95–110)
CO2 SERPL-SCNC: 22 MMOL/L (ref 23–29)
CREAT SERPL-MCNC: 0.9 MG/DL (ref 0.5–1.4)
EST. GFR  (AFRICAN AMERICAN): >60 ML/MIN/1.73 M^2
EST. GFR  (NON AFRICAN AMERICAN): >60 ML/MIN/1.73 M^2
GLUCOSE SERPL-MCNC: 99 MG/DL (ref 70–110)
POTASSIUM SERPL-SCNC: 4.2 MMOL/L (ref 3.5–5.1)
PROT SERPL-MCNC: 7.3 G/DL (ref 6–8.4)
SODIUM SERPL-SCNC: 137 MMOL/L (ref 136–145)

## 2019-10-17 PROCEDURE — 99232 PR SUBSEQUENT HOSPITAL CARE,LEVL II: ICD-10-PCS | Mod: ,,, | Performed by: INTERNAL MEDICINE

## 2019-10-17 PROCEDURE — G0378 HOSPITAL OBSERVATION PER HR: HCPCS

## 2019-10-17 PROCEDURE — S4991 NICOTINE PATCH NONLEGEND: HCPCS | Performed by: PHYSICIAN ASSISTANT

## 2019-10-17 PROCEDURE — 25000003 PHARM REV CODE 250: Performed by: INTERNAL MEDICINE

## 2019-10-17 PROCEDURE — 25000003 PHARM REV CODE 250: Performed by: PHYSICIAN ASSISTANT

## 2019-10-17 PROCEDURE — 36415 COLL VENOUS BLD VENIPUNCTURE: CPT

## 2019-10-17 PROCEDURE — 11000001 HC ACUTE MED/SURG PRIVATE ROOM

## 2019-10-17 PROCEDURE — 80053 COMPREHEN METABOLIC PANEL: CPT

## 2019-10-17 PROCEDURE — 99232 SBSQ HOSP IP/OBS MODERATE 35: CPT | Mod: ,,, | Performed by: INTERNAL MEDICINE

## 2019-10-17 PROCEDURE — 63600175 PHARM REV CODE 636 W HCPCS: Performed by: PHYSICIAN ASSISTANT

## 2019-10-17 RX ORDER — DIAZEPAM 5 MG/1
5 TABLET ORAL EVERY 8 HOURS
Status: COMPLETED | OUTPATIENT
Start: 2019-10-17 | End: 2019-10-18

## 2019-10-17 RX ORDER — CYCLOBENZAPRINE HCL 5 MG
5 TABLET ORAL 3 TIMES DAILY
Qty: 30 TABLET | Refills: 0 | Status: SHIPPED | OUTPATIENT
Start: 2019-10-17 | End: 2019-10-27

## 2019-10-17 RX ADMIN — Medication 100 MG: at 08:10

## 2019-10-17 RX ADMIN — CYCLOBENZAPRINE HYDROCHLORIDE 5 MG: 5 TABLET, FILM COATED ORAL at 08:10

## 2019-10-17 RX ADMIN — IBUPROFEN 400 MG: 200 TABLET, FILM COATED ORAL at 12:10

## 2019-10-17 RX ADMIN — RAMELTEON 8 MG: 8 TABLET ORAL at 08:10

## 2019-10-17 RX ADMIN — MENTHOL, METHYL SALICYLATE: 10; 15 CREAM TOPICAL at 08:10

## 2019-10-17 RX ADMIN — IBUPROFEN 400 MG: 200 TABLET, FILM COATED ORAL at 05:10

## 2019-10-17 RX ADMIN — CYCLOBENZAPRINE HYDROCHLORIDE 5 MG: 5 TABLET, FILM COATED ORAL at 02:10

## 2019-10-17 RX ADMIN — MENTHOL, METHYL SALICYLATE: 10; 15 CREAM TOPICAL at 02:10

## 2019-10-17 RX ADMIN — DIAZEPAM 5 MG: 5 TABLET ORAL at 09:10

## 2019-10-17 RX ADMIN — DIAZEPAM 5 MG: 5 TABLET ORAL at 08:10

## 2019-10-17 RX ADMIN — FOLIC ACID 1 MG: 1 TABLET ORAL at 08:10

## 2019-10-17 RX ADMIN — THERA TABS 1 TABLET: TAB at 08:10

## 2019-10-17 RX ADMIN — KETOROLAC TROMETHAMINE 15 MG: 30 INJECTION, SOLUTION INTRAMUSCULAR at 12:10

## 2019-10-17 RX ADMIN — KETOROLAC TROMETHAMINE 15 MG: 30 INJECTION, SOLUTION INTRAMUSCULAR at 04:10

## 2019-10-17 RX ADMIN — SENNOSIDES, DOCUSATE SODIUM 1 TABLET: 50; 8.6 TABLET, FILM COATED ORAL at 08:10

## 2019-10-17 RX ADMIN — NICOTINE 1 PATCH: 21 PATCH, EXTENDED RELEASE TRANSDERMAL at 08:10

## 2019-10-17 NOTE — PLAN OF CARE
10/17/19 0849   Post-Acute Status   Post-Acute Authorization Other   Other Status No Post-Acute Service Needs     Met with Pt in the room per request, Pt needs to be at [facility] tomorrow before 1pm for paperwork as Pt has been there in the past. Isaac informed he would provide transport to shelter and inform staff in the am if Pt is stable to d.c asa and Sw would get Pt to the shelter prior to 1pm. Pt verbalized understanding and appreciated Isaac's assistance.

## 2019-10-18 VITALS
DIASTOLIC BLOOD PRESSURE: 85 MMHG | HEIGHT: 71 IN | OXYGEN SATURATION: 97 % | BODY MASS INDEX: 25.8 KG/M2 | TEMPERATURE: 97 F | WEIGHT: 184.31 LBS | SYSTOLIC BLOOD PRESSURE: 124 MMHG | HEART RATE: 68 BPM | RESPIRATION RATE: 18 BRPM

## 2019-10-18 PROCEDURE — 25000003 PHARM REV CODE 250: Performed by: INTERNAL MEDICINE

## 2019-10-18 PROCEDURE — 99238 PR HOSPITAL DISCHARGE DAY,<30 MIN: ICD-10-PCS | Mod: ,,, | Performed by: INTERNAL MEDICINE

## 2019-10-18 PROCEDURE — G0378 HOSPITAL OBSERVATION PER HR: HCPCS

## 2019-10-18 PROCEDURE — 99238 HOSP IP/OBS DSCHRG MGMT 30/<: CPT | Mod: ,,, | Performed by: INTERNAL MEDICINE

## 2019-10-18 RX ADMIN — MENTHOL, METHYL SALICYLATE: 10; 15 CREAM TOPICAL at 08:10

## 2019-10-18 RX ADMIN — DIAZEPAM 5 MG: 5 TABLET ORAL at 05:10

## 2019-10-18 RX ADMIN — CYCLOBENZAPRINE HYDROCHLORIDE 5 MG: 5 TABLET, FILM COATED ORAL at 08:10

## 2019-10-18 RX ADMIN — IBUPROFEN 400 MG: 200 TABLET, FILM COATED ORAL at 05:10
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
## 2019-10-18 NOTE — PLAN OF CARE
Pt turns and repositions self independently. No skin breakdown noted. Pt pain and safety monitored q 1-2 hrs this shift . Bed locked and in lowest position. Rails elevated x 2 Brakes on. Call light and personal belongings in reach. Will continue monitor. Pt is ready for discharge

## 2019-10-18 NOTE — DISCHARGE INSTRUCTIONS
You are being discharged early to today to allow you to present outpatient alcohol rehab.      A referral has been made to Addiction Psychiatry - you should be contacted in regards to scheduling an appointment.  While this clinic referral will assist you in your sobriety this is not an alternative to your plans for outpatient alcohol rehab and we strongly recommend that you present there today.

## 2019-10-18 NOTE — PLAN OF CARE
No acute events throughout night. Pt AAO X 4; able to express needs.  No c/o pain.  Requested sleeping pill .  Safety maintained.  Bed in low position,  call  light in reach.    Will continue to monitor

## 2019-10-18 NOTE — PLAN OF CARE
10/18/19 0857   Post-Acute Status   Post-Acute Authorization Other   Other Status No Post-Acute Service Needs

## 2019-10-22 NOTE — PLAN OF CARE
10/22/19 0846   Final Note   Assessment Type Final Discharge Note   Anticipated Discharge Disposition   ([facility])   What phone number can be called within the next 1-3 days to see how you are doing after discharge? [phone]   Hospital Follow Up  Appt(s) scheduled? No   Discharge plans and expectations educations in teach back method with documentation complete? Yes   Right Care Referral Info   Post Acute Recommendation Other  ([facility])

## 2019-10-31 ENCOUNTER — HOSPITAL ENCOUNTER (INPATIENT)
Facility: HOSPITAL | Age: 44
LOS: 5 days | Discharge: HOME | DRG: 897 | End: 2019-11-05
Attending: EMERGENCY MEDICINE | Admitting: EMERGENCY MEDICINE
Payer: MEDICAID

## 2019-10-31 DIAGNOSIS — S43.102S SEPARATION OF LEFT ACROMIOCLAVICULAR JOINT, SEQUELA: ICD-10-CM

## 2019-10-31 DIAGNOSIS — F10.20 ALCOHOL USE DISORDER, SEVERE, DEPENDENCE: Chronic | ICD-10-CM

## 2019-10-31 DIAGNOSIS — F17.200 TOBACCO USE DISORDER: Chronic | ICD-10-CM

## 2019-10-31 DIAGNOSIS — F10.930 ALCOHOL WITHDRAWAL SYNDROME WITHOUT COMPLICATION: Primary | ICD-10-CM

## 2019-10-31 DIAGNOSIS — R74.01 TRANSAMINITIS: Chronic | ICD-10-CM

## 2019-10-31 DIAGNOSIS — M21.922 DEFORMITY OF LEFT SHOULDER JOINT: ICD-10-CM

## 2019-10-31 DIAGNOSIS — S49.92XD ACROMIOCLAVICULAR (AC) JOINT INJURY, LEFT, SUBSEQUENT ENCOUNTER: ICD-10-CM

## 2019-10-31 LAB
ALBUMIN SERPL BCP-MCNC: 4.2 G/DL (ref 3.5–5.2)
ALP SERPL-CCNC: 191 U/L (ref 55–135)
ALT SERPL W/O P-5'-P-CCNC: 99 U/L (ref 10–44)
ANION GAP SERPL CALC-SCNC: 19 MMOL/L (ref 8–16)
AST SERPL-CCNC: 144 U/L (ref 10–40)
BASOPHILS # BLD AUTO: 0.08 K/UL (ref 0–0.2)
BASOPHILS NFR BLD: 0.9 % (ref 0–1.9)
BILIRUB SERPL-MCNC: 0.5 MG/DL (ref 0.1–1)
BUN SERPL-MCNC: 5 MG/DL (ref 6–20)
CALCIUM SERPL-MCNC: 9.1 MG/DL (ref 8.7–10.5)
CHLORIDE SERPL-SCNC: 102 MMOL/L (ref 95–110)
CO2 SERPL-SCNC: 20 MMOL/L (ref 23–29)
CREAT SERPL-MCNC: 0.7 MG/DL (ref 0.5–1.4)
DIFFERENTIAL METHOD: ABNORMAL
EOSINOPHIL # BLD AUTO: 0 K/UL (ref 0–0.5)
EOSINOPHIL NFR BLD: 0.2 % (ref 0–8)
ERYTHROCYTE [DISTWIDTH] IN BLOOD BY AUTOMATED COUNT: 14 % (ref 11.5–14.5)
EST. GFR  (AFRICAN AMERICAN): >60 ML/MIN/1.73 M^2
EST. GFR  (NON AFRICAN AMERICAN): >60 ML/MIN/1.73 M^2
ETHANOL SERPL-MCNC: 214 MG/DL
GLUCOSE SERPL-MCNC: 74 MG/DL (ref 70–110)
HCT VFR BLD AUTO: 46.8 % (ref 40–54)
HGB BLD-MCNC: 15.7 G/DL (ref 14–18)
IMM GRANULOCYTES # BLD AUTO: 0.03 K/UL (ref 0–0.04)
IMM GRANULOCYTES NFR BLD AUTO: 0.3 % (ref 0–0.5)
LYMPHOCYTES # BLD AUTO: 1.3 K/UL (ref 1–4.8)
LYMPHOCYTES NFR BLD: 14.2 % (ref 18–48)
MAGNESIUM SERPL-MCNC: 1.9 MG/DL (ref 1.6–2.6)
MCH RBC QN AUTO: 30.4 PG (ref 27–31)
MCHC RBC AUTO-ENTMCNC: 33.5 G/DL (ref 32–36)
MCV RBC AUTO: 91 FL (ref 82–98)
MONOCYTES # BLD AUTO: 0.5 K/UL (ref 0.3–1)
MONOCYTES NFR BLD: 5.7 % (ref 4–15)
NEUTROPHILS # BLD AUTO: 6.9 K/UL (ref 1.8–7.7)
NEUTROPHILS NFR BLD: 78.7 % (ref 38–73)
NRBC BLD-RTO: 0 /100 WBC
PHOSPHATE SERPL-MCNC: 3.2 MG/DL (ref 2.7–4.5)
PLATELET # BLD AUTO: 336 K/UL (ref 150–350)
PMV BLD AUTO: 10 FL (ref 9.2–12.9)
POTASSIUM SERPL-SCNC: 3.5 MMOL/L (ref 3.5–5.1)
PROT SERPL-MCNC: 7.5 G/DL (ref 6–8.4)
RBC # BLD AUTO: 5.16 M/UL (ref 4.6–6.2)
SODIUM SERPL-SCNC: 141 MMOL/L (ref 136–145)
WBC # BLD AUTO: 8.82 K/UL (ref 3.9–12.7)

## 2019-10-31 PROCEDURE — 80053 COMPREHEN METABOLIC PANEL: CPT

## 2019-10-31 PROCEDURE — 12000002 HC ACUTE/MED SURGE SEMI-PRIVATE ROOM

## 2019-10-31 PROCEDURE — 99285 PR EMERGENCY DEPT VISIT,LEVEL V: ICD-10-PCS | Mod: ,,, | Performed by: EMERGENCY MEDICINE

## 2019-10-31 PROCEDURE — 84100 ASSAY OF PHOSPHORUS: CPT

## 2019-10-31 PROCEDURE — 96374 THER/PROPH/DIAG INJ IV PUSH: CPT

## 2019-10-31 PROCEDURE — 63600175 PHARM REV CODE 636 W HCPCS: Performed by: EMERGENCY MEDICINE

## 2019-10-31 PROCEDURE — 83735 ASSAY OF MAGNESIUM: CPT

## 2019-10-31 PROCEDURE — 96376 TX/PRO/DX INJ SAME DRUG ADON: CPT

## 2019-10-31 PROCEDURE — 25000003 PHARM REV CODE 250: Performed by: EMERGENCY MEDICINE

## 2019-10-31 PROCEDURE — 85025 COMPLETE CBC W/AUTO DIFF WBC: CPT

## 2019-10-31 PROCEDURE — 63600175 PHARM REV CODE 636 W HCPCS

## 2019-10-31 PROCEDURE — 80320 DRUG SCREEN QUANTALCOHOLS: CPT

## 2019-10-31 PROCEDURE — 99285 EMERGENCY DEPT VISIT HI MDM: CPT | Mod: 25

## 2019-10-31 PROCEDURE — 99285 EMERGENCY DEPT VISIT HI MDM: CPT | Mod: ,,, | Performed by: EMERGENCY MEDICINE

## 2019-10-31 PROCEDURE — 80307 DRUG TEST PRSMV CHEM ANLYZR: CPT

## 2019-10-31 PROCEDURE — 96361 HYDRATE IV INFUSION ADD-ON: CPT

## 2019-10-31 RX ORDER — PHENOBARBITAL SODIUM 65 MG/ML
150 INJECTION, SOLUTION INTRAMUSCULAR; INTRAVENOUS
Status: COMPLETED | OUTPATIENT
Start: 2019-11-01 | End: 2019-11-01

## 2019-10-31 RX ORDER — LORAZEPAM 2 MG/ML
2 INJECTION INTRAMUSCULAR
Status: COMPLETED | OUTPATIENT
Start: 2019-10-31 | End: 2019-10-31

## 2019-10-31 RX ORDER — LORAZEPAM 2 MG/ML
1 INJECTION INTRAMUSCULAR
Status: COMPLETED | OUTPATIENT
Start: 2019-10-31 | End: 2019-10-31

## 2019-10-31 RX ORDER — LORAZEPAM 2 MG/ML
INJECTION INTRAMUSCULAR
Status: COMPLETED
Start: 2019-10-31 | End: 2019-10-31

## 2019-10-31 RX ORDER — DIAZEPAM 5 MG/1
10 TABLET ORAL
Status: COMPLETED | OUTPATIENT
Start: 2019-10-31 | End: 2019-10-31

## 2019-10-31 RX ORDER — THIAMINE HCL 100 MG
100 TABLET ORAL DAILY
Status: DISCONTINUED | OUTPATIENT
Start: 2019-11-01 | End: 2019-11-01

## 2019-10-31 RX ORDER — FOLIC ACID 1 MG/1
1 TABLET ORAL DAILY
Status: DISCONTINUED | OUTPATIENT
Start: 2019-11-01 | End: 2019-11-01

## 2019-10-31 RX ADMIN — SODIUM CHLORIDE 1000 ML: 0.9 INJECTION, SOLUTION INTRAVENOUS at 08:10

## 2019-10-31 RX ADMIN — LORAZEPAM 2 MG: 2 INJECTION INTRAMUSCULAR; INTRAVENOUS at 09:10

## 2019-10-31 RX ADMIN — LORAZEPAM 1 MG: 2 INJECTION INTRAMUSCULAR at 08:10

## 2019-10-31 RX ADMIN — LORAZEPAM 2 MG: 2 INJECTION INTRAMUSCULAR; INTRAVENOUS at 11:10

## 2019-10-31 RX ADMIN — DIAZEPAM 10 MG: 5 TABLET ORAL at 10:10

## 2019-10-31 RX ADMIN — LORAZEPAM 1 MG: 2 INJECTION INTRAMUSCULAR; INTRAVENOUS at 08:10

## 2019-11-01 PROBLEM — S43.102A SEPARATION OF LEFT ACROMIOCLAVICULAR JOINT: Status: ACTIVE | Noted: 2019-10-31

## 2019-11-01 PROBLEM — R74.01 TRANSAMINITIS: Chronic | Status: ACTIVE | Noted: 2018-05-07

## 2019-11-01 LAB
ALBUMIN SERPL BCP-MCNC: 3.5 G/DL (ref 3.5–5.2)
ALP SERPL-CCNC: 170 U/L (ref 55–135)
ALT SERPL W/O P-5'-P-CCNC: 81 U/L (ref 10–44)
AMPHET+METHAMPHET UR QL: ABNORMAL
AMPHET+METHAMPHET UR QL: NORMAL
ANION GAP SERPL CALC-SCNC: 15 MMOL/L (ref 8–16)
AST SERPL-CCNC: 118 U/L (ref 10–40)
BARBITURATES UR QL SCN>200 NG/ML: NEGATIVE
BARBITURATES UR QL SCN>200 NG/ML: NEGATIVE
BASOPHILS # BLD AUTO: 0.07 K/UL (ref 0–0.2)
BASOPHILS NFR BLD: 0.8 % (ref 0–1.9)
BENZODIAZ UR QL SCN>200 NG/ML: ABNORMAL
BENZODIAZ UR QL SCN>200 NG/ML: NORMAL
BILIRUB SERPL-MCNC: 0.8 MG/DL (ref 0.1–1)
BUN SERPL-MCNC: 6 MG/DL (ref 6–20)
BZE UR QL SCN: NEGATIVE
BZE UR QL SCN: NEGATIVE
CALCIUM SERPL-MCNC: 8.1 MG/DL (ref 8.7–10.5)
CANNABINOIDS UR QL SCN: NEGATIVE
CANNABINOIDS UR QL SCN: NEGATIVE
CHLORIDE SERPL-SCNC: 100 MMOL/L (ref 95–110)
CO2 SERPL-SCNC: 22 MMOL/L (ref 23–29)
CREAT SERPL-MCNC: 0.8 MG/DL (ref 0.5–1.4)
CREAT UR-MCNC: 95 MG/DL (ref 23–375)
CREAT UR-MCNC: 95 MG/DL (ref 23–375)
DIFFERENTIAL METHOD: ABNORMAL
EOSINOPHIL # BLD AUTO: 0.3 K/UL (ref 0–0.5)
EOSINOPHIL NFR BLD: 3 % (ref 0–8)
ERYTHROCYTE [DISTWIDTH] IN BLOOD BY AUTOMATED COUNT: 14.2 % (ref 11.5–14.5)
EST. GFR  (AFRICAN AMERICAN): >60 ML/MIN/1.73 M^2
EST. GFR  (NON AFRICAN AMERICAN): >60 ML/MIN/1.73 M^2
ESTIMATED AVG GLUCOSE: 103 MG/DL (ref 68–131)
ETHANOL UR-MCNC: 280 MG/DL
GLUCOSE SERPL-MCNC: 70 MG/DL (ref 70–110)
HBA1C MFR BLD HPLC: 5.2 % (ref 4–5.6)
HCT VFR BLD AUTO: 41.6 % (ref 40–54)
HGB BLD-MCNC: 13.5 G/DL (ref 14–18)
IMM GRANULOCYTES # BLD AUTO: 0.02 K/UL (ref 0–0.04)
IMM GRANULOCYTES NFR BLD AUTO: 0.2 % (ref 0–0.5)
LYMPHOCYTES # BLD AUTO: 1.2 K/UL (ref 1–4.8)
LYMPHOCYTES NFR BLD: 14.2 % (ref 18–48)
MAGNESIUM SERPL-MCNC: 1.6 MG/DL (ref 1.6–2.6)
MCH RBC QN AUTO: 29.9 PG (ref 27–31)
MCHC RBC AUTO-ENTMCNC: 32.5 G/DL (ref 32–36)
MCV RBC AUTO: 92 FL (ref 82–98)
METHADONE UR QL SCN>300 NG/ML: NEGATIVE
METHADONE UR QL SCN>300 NG/ML: NEGATIVE
MONOCYTES # BLD AUTO: 0.4 K/UL (ref 0.3–1)
MONOCYTES NFR BLD: 5.1 % (ref 4–15)
NEUTROPHILS # BLD AUTO: 6.5 K/UL (ref 1.8–7.7)
NEUTROPHILS NFR BLD: 76.7 % (ref 38–73)
NRBC BLD-RTO: 0 /100 WBC
OPIATES UR QL SCN: NEGATIVE
OPIATES UR QL SCN: NEGATIVE
PCP UR QL SCN>25 NG/ML: NEGATIVE
PCP UR QL SCN>25 NG/ML: NEGATIVE
PHOSPHATE SERPL-MCNC: 2.5 MG/DL (ref 2.7–4.5)
PLATELET # BLD AUTO: 274 K/UL (ref 150–350)
PMV BLD AUTO: 10.1 FL (ref 9.2–12.9)
POTASSIUM SERPL-SCNC: 3.7 MMOL/L (ref 3.5–5.1)
PROT SERPL-MCNC: 6.5 G/DL (ref 6–8.4)
RBC # BLD AUTO: 4.51 M/UL (ref 4.6–6.2)
SODIUM SERPL-SCNC: 137 MMOL/L (ref 136–145)
TOXICOLOGY INFORMATION: ABNORMAL
TOXICOLOGY INFORMATION: NORMAL
WBC # BLD AUTO: 8.43 K/UL (ref 3.9–12.7)

## 2019-11-01 PROCEDURE — 63600175 PHARM REV CODE 636 W HCPCS: Performed by: EMERGENCY MEDICINE

## 2019-11-01 PROCEDURE — 63600175 PHARM REV CODE 636 W HCPCS: Performed by: HOSPITALIST

## 2019-11-01 PROCEDURE — 25000003 PHARM REV CODE 250: Performed by: HOSPITALIST

## 2019-11-01 PROCEDURE — 25000003 PHARM REV CODE 250: Performed by: INTERNAL MEDICINE

## 2019-11-01 PROCEDURE — 80053 COMPREHEN METABOLIC PANEL: CPT

## 2019-11-01 PROCEDURE — 25000003 PHARM REV CODE 250: Performed by: PHYSICIAN ASSISTANT

## 2019-11-01 PROCEDURE — 99233 PR SUBSEQUENT HOSPITAL CARE,LEVL III: ICD-10-PCS | Mod: AF,HB,, | Performed by: PSYCHIATRY & NEUROLOGY

## 2019-11-01 PROCEDURE — 99223 PR INITIAL HOSPITAL CARE,LEVL III: ICD-10-PCS | Mod: ,,, | Performed by: PHYSICIAN ASSISTANT

## 2019-11-01 PROCEDURE — 36415 COLL VENOUS BLD VENIPUNCTURE: CPT

## 2019-11-01 PROCEDURE — 83036 HEMOGLOBIN GLYCOSYLATED A1C: CPT

## 2019-11-01 PROCEDURE — 20600001 HC STEP DOWN PRIVATE ROOM

## 2019-11-01 PROCEDURE — 83735 ASSAY OF MAGNESIUM: CPT

## 2019-11-01 PROCEDURE — 99233 SBSQ HOSP IP/OBS HIGH 50: CPT | Mod: AF,HB,, | Performed by: PSYCHIATRY & NEUROLOGY

## 2019-11-01 PROCEDURE — S4991 NICOTINE PATCH NONLEGEND: HCPCS | Performed by: PHYSICIAN ASSISTANT

## 2019-11-01 PROCEDURE — 85025 COMPLETE CBC W/AUTO DIFF WBC: CPT

## 2019-11-01 PROCEDURE — 63600175 PHARM REV CODE 636 W HCPCS: Performed by: INTERNAL MEDICINE

## 2019-11-01 PROCEDURE — 84100 ASSAY OF PHOSPHORUS: CPT

## 2019-11-01 PROCEDURE — 99223 1ST HOSP IP/OBS HIGH 75: CPT | Mod: ,,, | Performed by: PHYSICIAN ASSISTANT

## 2019-11-01 RX ORDER — THIAMINE HCL 100 MG
100 TABLET ORAL DAILY
Status: DISCONTINUED | OUTPATIENT
Start: 2019-11-01 | End: 2019-11-05 | Stop reason: HOSPADM

## 2019-11-01 RX ORDER — LORAZEPAM 2 MG/ML
2 INJECTION INTRAMUSCULAR EVERY 6 HOURS PRN
Status: DISCONTINUED | OUTPATIENT
Start: 2019-11-01 | End: 2019-11-05 | Stop reason: HOSPADM

## 2019-11-01 RX ORDER — IBUPROFEN 200 MG
24 TABLET ORAL
Status: DISCONTINUED | OUTPATIENT
Start: 2019-11-01 | End: 2019-11-05 | Stop reason: HOSPADM

## 2019-11-01 RX ORDER — SODIUM CHLORIDE 0.9 % (FLUSH) 0.9 %
5 SYRINGE (ML) INJECTION
Status: DISCONTINUED | OUTPATIENT
Start: 2019-11-01 | End: 2019-11-05 | Stop reason: HOSPADM

## 2019-11-01 RX ORDER — LORAZEPAM 1 MG/1
2 TABLET ORAL EVERY 4 HOURS PRN
Status: DISCONTINUED | OUTPATIENT
Start: 2019-11-01 | End: 2019-11-01

## 2019-11-01 RX ORDER — DIAZEPAM 5 MG/1
5 TABLET ORAL EVERY 6 HOURS PRN
Status: DISCONTINUED | OUTPATIENT
Start: 2019-11-01 | End: 2019-11-05 | Stop reason: HOSPADM

## 2019-11-01 RX ORDER — GLUCAGON 1 MG
1 KIT INJECTION
Status: DISCONTINUED | OUTPATIENT
Start: 2019-11-01 | End: 2019-11-05 | Stop reason: HOSPADM

## 2019-11-01 RX ORDER — DIAZEPAM 5 MG/1
5 TABLET ORAL EVERY 8 HOURS
Status: DISCONTINUED | OUTPATIENT
Start: 2019-11-01 | End: 2019-11-01

## 2019-11-01 RX ORDER — FOLIC ACID 1 MG/1
1 TABLET ORAL DAILY
Status: DISCONTINUED | OUTPATIENT
Start: 2019-11-01 | End: 2019-11-05 | Stop reason: HOSPADM

## 2019-11-01 RX ORDER — IPRATROPIUM BROMIDE AND ALBUTEROL SULFATE 2.5; .5 MG/3ML; MG/3ML
3 SOLUTION RESPIRATORY (INHALATION) EVERY 4 HOURS PRN
Status: DISCONTINUED | OUTPATIENT
Start: 2019-11-01 | End: 2019-11-05 | Stop reason: HOSPADM

## 2019-11-01 RX ORDER — RAMELTEON 8 MG/1
8 TABLET ORAL NIGHTLY PRN
Status: DISCONTINUED | OUTPATIENT
Start: 2019-11-01 | End: 2019-11-05 | Stop reason: HOSPADM

## 2019-11-01 RX ORDER — ACETAMINOPHEN 325 MG/1
650 TABLET ORAL EVERY 4 HOURS PRN
Status: DISCONTINUED | OUTPATIENT
Start: 2019-11-01 | End: 2019-11-05 | Stop reason: HOSPADM

## 2019-11-01 RX ORDER — DIAZEPAM 5 MG/1
10 TABLET ORAL EVERY 8 HOURS
Status: DISCONTINUED | OUTPATIENT
Start: 2019-11-01 | End: 2019-11-01

## 2019-11-01 RX ORDER — PROMETHAZINE HYDROCHLORIDE 12.5 MG/1
25 TABLET ORAL EVERY 6 HOURS PRN
Status: DISCONTINUED | OUTPATIENT
Start: 2019-11-01 | End: 2019-11-05 | Stop reason: HOSPADM

## 2019-11-01 RX ORDER — KETOROLAC TROMETHAMINE 15 MG/ML
15 INJECTION, SOLUTION INTRAMUSCULAR; INTRAVENOUS EVERY 6 HOURS PRN
Status: DISPENSED | OUTPATIENT
Start: 2019-11-01 | End: 2019-11-04

## 2019-11-01 RX ORDER — ONDANSETRON 8 MG/1
8 TABLET, ORALLY DISINTEGRATING ORAL EVERY 8 HOURS PRN
Status: DISCONTINUED | OUTPATIENT
Start: 2019-11-01 | End: 2019-11-05 | Stop reason: HOSPADM

## 2019-11-01 RX ORDER — AMOXICILLIN 250 MG
1 CAPSULE ORAL 2 TIMES DAILY
Status: DISCONTINUED | OUTPATIENT
Start: 2019-11-01 | End: 2019-11-05 | Stop reason: HOSPADM

## 2019-11-01 RX ORDER — DIAZEPAM 5 MG/1
10 TABLET ORAL EVERY 6 HOURS
Status: DISCONTINUED | OUTPATIENT
Start: 2019-11-01 | End: 2019-11-03

## 2019-11-01 RX ORDER — IBUPROFEN 200 MG
16 TABLET ORAL
Status: DISCONTINUED | OUTPATIENT
Start: 2019-11-01 | End: 2019-11-05 | Stop reason: HOSPADM

## 2019-11-01 RX ORDER — IBUPROFEN 200 MG
1 TABLET ORAL DAILY
Status: DISCONTINUED | OUTPATIENT
Start: 2019-11-01 | End: 2019-11-05 | Stop reason: HOSPADM

## 2019-11-01 RX ORDER — THIAMINE HYDROCHLORIDE 100 MG/ML
100 INJECTION, SOLUTION INTRAMUSCULAR; INTRAVENOUS DAILY
Status: DISCONTINUED | OUTPATIENT
Start: 2019-11-01 | End: 2019-11-01

## 2019-11-01 RX ORDER — THIAMINE HCL 100 MG
100 TABLET ORAL DAILY
Status: DISCONTINUED | OUTPATIENT
Start: 2019-11-05 | End: 2019-11-01

## 2019-11-01 RX ADMIN — DIAZEPAM 5 MG: 5 TABLET ORAL at 10:11

## 2019-11-01 RX ADMIN — THERA TABS 1 TABLET: TAB at 10:11

## 2019-11-01 RX ADMIN — PHENOBARBITAL SODIUM 150.15 MG: 65 INJECTION INTRAMUSCULAR; INTRAVENOUS at 12:11

## 2019-11-01 RX ADMIN — KETOROLAC TROMETHAMINE 15 MG: 15 INJECTION, SOLUTION INTRAMUSCULAR; INTRAVENOUS at 12:11

## 2019-11-01 RX ADMIN — LORAZEPAM 2 MG: 1 TABLET ORAL at 10:11

## 2019-11-01 RX ADMIN — LORAZEPAM 2 MG: 1 TABLET ORAL at 02:11

## 2019-11-01 RX ADMIN — KETOROLAC TROMETHAMINE 15 MG: 15 INJECTION, SOLUTION INTRAMUSCULAR; INTRAVENOUS at 05:11

## 2019-11-01 RX ADMIN — NICOTINE 1 PATCH: 21 PATCH, EXTENDED RELEASE TRANSDERMAL at 10:11

## 2019-11-01 RX ADMIN — RAMELTEON 8 MG: 8 TABLET ORAL at 10:11

## 2019-11-01 RX ADMIN — LORAZEPAM 2 MG: 1 TABLET ORAL at 06:11

## 2019-11-01 RX ADMIN — DIAZEPAM 5 MG: 5 TABLET ORAL at 02:11

## 2019-11-01 RX ADMIN — SENNOSIDES AND DOCUSATE SODIUM 1 TABLET: 8.6; 5 TABLET ORAL at 09:11

## 2019-11-01 RX ADMIN — DIAZEPAM 10 MG: 5 TABLET ORAL at 11:11

## 2019-11-01 RX ADMIN — DIAZEPAM 10 MG: 5 TABLET ORAL at 06:11

## 2019-11-01 RX ADMIN — FOLIC ACID 1 MG: 1 TABLET ORAL at 10:11

## 2019-11-01 RX ADMIN — DIAZEPAM 5 MG: 5 TABLET ORAL at 05:11

## 2019-11-01 RX ADMIN — SENNOSIDES AND DOCUSATE SODIUM 1 TABLET: 8.6; 5 TABLET ORAL at 10:11

## 2019-11-01 RX ADMIN — Medication 100 MG: at 10:11

## 2019-11-01 RX ADMIN — LORAZEPAM 2 MG: 2 INJECTION INTRAMUSCULAR; INTRAVENOUS at 06:11

## 2019-11-01 NOTE — ASSESSMENT & PLAN NOTE
Gilmar Clemente is a 43 y.o. male with a type V AC joint separation of the left shoulder.  He is neurovascularly intact. There is no skin tenting.  He is placed in a sling to the left upper extremity.  He is nonweightbearing to the left upper extremity.  He is currently being admitted for alcohol withdrawal.  Explained the patient that he may require surgery for this injury.  He wishes to follow up at [facility] for this injury.  Orthopedics will be available to assess patient if his exam changes while inpatient.
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
## 2019-11-01 NOTE — SUBJECTIVE & OBJECTIVE
"     Patient History           Medical as of 11/1/2019     Past Medical History     Diagnosis Date Comments Source    Alcohol abuse -- -- Provider    Depression -- -- Provider    History of psychiatric hospitalization -- -- Provider    Hx of psychiatric care -- -- Provider    Psychiatric exam requested by authority -- -- Provider    Psychiatric problem -- -- Provider    Suicide attempt -- -- Provider    Therapy -- -- Provider    Withdrawal symptoms, alcohol -- -- Provider                  Surgical as of 11/1/2019    Past Surgical History: Patient provided no pertinent surgical history.           Family as of 11/1/2019     Problem Relation Name Age of Onset Comments Source    No Known Problems Mother -- -- -- Provider    No Known Problems Father -- -- -- Provider    Alcohol abuse Paternal Grandmother -- -- -- Provider            Tobacco Use as of 11/1/2019     Smoking Status Smoking Start Date Smoking Quit Date Packs/Day Years Used    Current Every Day Smoker -- -- 1.00 30.00    Types Comments Smokeless Tobacco Status Smokeless Tobacco Quit Date Source     Cigarettes -- Never Used -- Provider            Alcohol Use as of 11/1/2019     Alcohol Use Drinks/Week Alcohol/Week Comments Source    Yes -- -- drinkl " alot everyday- wine beer whatever I get my hands on" Provider    Frequency Standard Drinks Binge Drinking        -- -- --              Drug Use as of 11/1/2019     Drug Use Types Frequency Comments Source    Yes  Cocaine, Marijuana -- "In the past, but not recently" Provider            Sexual Activity as of 11/1/2019     Sexually Active Birth Control Partners Comments Source    Yes -- Female -- Provider            Activities of Daily Living as of 11/1/2019     Activities of Daily Living Question Response Comments Source    Patient feels they ought to cut down on drinking/drug use Yes -- Provider    Patient annoyed by others criticizing their drinking/drug use Yes -- Provider    Patient has felt bad or guilty " about drinking/drug use Yes -- Provider    Patient has had a drink/used drugs as an eye opener in the AM Yes -- Provider            Social Documentation as of 11/1/2019    Patient currently homeless  Source: Provider           Occupational as of 11/1/2019     Occupation Employer Comments Source    [employer] -- -- Provider            Socioeconomic as of 11/1/2019     Marital Status Spouse Name Number of Children Years Education Education Level Preferred Language Ethnicity Race Source    Single -- -- -- -- English /White White Provider    Financial Resource Strain Food Insecurity: Worry Food Insecurity: Inability Transportation Needs: Medical Transportation Needs: Non-medical    -- -- -- -- --            Pertinent History     Question Response Comments    Lives with alone --    Place in Birth Order 2nd --    Lives in homeless --    Number of Siblings 1 --    Raised by biological parents --    Legal Involvement none --    Childhood Trauma uneventful --    Criminal History of arrest DUI    Financial Status unemployed --    Highest Level of Education trade school --    Does patient have access to a firearm? No --     Service No --    Primary Leisure Activity fishing/hunting --    Spirituality actively participates in organized Scientologist --        Past Medical History:   Diagnosis Date    Alcohol abuse     Depression     History of psychiatric hospitalization     Hx of psychiatric care     Psychiatric exam requested by authority     Psychiatric problem     Suicide attempt     Therapy     Withdrawal symptoms, alcohol      History reviewed. No pertinent surgical history.  Family History     Problem Relation (Age of Onset)    Alcohol abuse Paternal Grandmother    No Known Problems Mother, Father        Tobacco Use    Smoking status: Current Every Day Smoker     Packs/day: 1.00     Years: 30.00     Pack years: 30.00     Types: Cigarettes    Smokeless tobacco: Never Used   Substance and Sexual Activity  "   Alcohol use: Yes     Comment: drinkl " alot everyday- wine beer whatever I get my hands on"    Drug use: Yes     Types: Cocaine, Marijuana     Comment: "In the past, but not recently"    Sexual activity: Yes     Partners: Female     Review of patient's allergies indicates:  No Known Allergies    No current facility-administered medications on file prior to encounter.      Current Outpatient Medications on File Prior to Encounter   Medication Sig    methyl salicylate-menthol 15-10% 15-10 % Crea Apply topically 3 (three) times daily.    naproxen (NAPROSYN) 500 MG tablet Take 1 tablet (500 mg total) by mouth 2 (two) times daily with meals.     Psychotherapeutics (From admission, onward)    Start     Stop Route Frequency Ordered    11/01/19 2200  diazePAM tablet 10 mg      -- Oral Every 8 hours 11/01/19 1412    11/01/19 0254  ramelteon tablet 8 mg      -- Oral Nightly PRN 11/01/19 0154    11/01/19 0214  LORazepam tablet 2 mg      -- Oral Every 4 hours PRN 11/01/19 0115        Review of Systems   Constitutional: Positive for activity change, appetite change, fatigue and unexpected weight change.   HENT: Negative.    Eyes: Positive for visual disturbance.   Respiratory: Negative.    Cardiovascular: Negative.    Gastrointestinal: Positive for nausea and vomiting.   Endocrine: Negative.    Genitourinary: Negative.    Musculoskeletal: Positive for arthralgias, back pain and myalgias.   Skin: Negative.    Allergic/Immunologic: Negative.    Neurological: Positive for tremors and weakness.   Hematological: Negative.      Strengths and Liabilities: Strength: Patient accepts guidance/feedback, Strength: Patient is expressive/articulate., Strength: Patient is motivated for change., Liability: Patient lacks coping skills.    Objective:     Vital Signs (Most Recent):  Temp: 98.4 °F (36.9 °C) (11/01/19 1225)  Pulse: 94 (11/01/19 1225)  Resp: 17 (11/01/19 1225)  BP: 130/79 (11/01/19 1225)  SpO2: 98 % (11/01/19 1225) Vital " "Signs (24h Range):  Temp:  [96.5 °F (35.8 °C)-98.4 °F (36.9 °C)] 98.4 °F (36.9 °C)  Pulse:  [] 94  Resp:  [16-22] 17  SpO2:  [97 %-100 %] 98 %  BP: (130-180)/() 130/79     Height: 5' 11" (180.3 cm)  Weight: 79.6 kg (175 lb 7.8 oz)  Body mass index is 24.48 kg/m².      Intake/Output Summary (Last 24 hours) at 11/1/2019 1428  Last data filed at 11/1/2019 1232  Gross per 24 hour   Intake 470 ml   Output 350 ml   Net 120 ml       Physical Exam   Psychiatric:   Mental Status Exam:  Appearance: unremarkable, age appropriate, normal weight, disheveled, lying in bed  Level of Consciousness: awake, alert, and oriented  Behavior/Cooperation: normal, friendly and cooperative  Psychomotor: unremarkable   Speech: normal tone, normal rate, normal pitch, normal volume  Language: english, fluid  Orientation: person, place, situation  Attention Span/Concentration: intact  Memory: intact  Mood: "Okay I guess"  Affect: normal and constricted  Thought Process: linear, normal and logical  Associations: normal and logical  Thought Content: normal, no suicidality, no homicidality, delusions, or paranoia  Fund of Knowledge: Aware of current events and Intact  Abstraction: proverbs were abstract  Insight: fair  Judgment: fair   Nursing note and vitals reviewed.       Significant Labs:   Last 72 Hours:   Recent Lab Results       11/05/19  0459   11/04/19  0348   11/03/19  0402        Albumin 3.6 3.7 3.2     Alkaline Phosphatase 159 175 166      82 79     Anion Gap 9 9 10     AST 86 61 78     Baso # 0.06 0.07 0.04     Basophil% 1.0 0.9 0.8     BILIRUBIN TOTAL 0.4  Comment:  For infants and newborns, interpretation of results should be based  on gestational age, weight and in agreement with clinical  observations.  Premature Infant recommended reference ranges:  Up to 24 hours.............<8.0 mg/dL  Up to 48 hours............<12.0 mg/dL  3-5 days..................<15.0 mg/dL  6-29 days.................<15.0 mg/dL   " 0.5  Comment:  For infants and newborns, interpretation of results should be based  on gestational age, weight and in agreement with clinical  observations.  Premature Infant recommended reference ranges:  Up to 24 hours.............<8.0 mg/dL  Up to 48 hours............<12.0 mg/dL  3-5 days..................<15.0 mg/dL  6-29 days.................<15.0 mg/dL   0.7  Comment:  For infants and newborns, interpretation of results should be based  on gestational age, weight and in agreement with clinical  observations.  Premature Infant recommended reference ranges:  Up to 24 hours.............<8.0 mg/dL  Up to 48 hours............<12.0 mg/dL  3-5 days..................<15.0 mg/dL  6-29 days.................<15.0 mg/dL       BUN, Bld 6 5 5     Calcium 9.2 9.0 8.6     Chloride 105 100 105     CO2 24 26 22     Creatinine 0.8 0.9 0.7     Differential Method Automated Automated Automated     eGFR if  >60.0 >60.0 >60.0     eGFR if non  >60.0  Comment:  Calculation used to obtain the estimated glomerular filtration  rate (eGFR) is the CKD-EPI equation.    >60.0  Comment:  Calculation used to obtain the estimated glomerular filtration  rate (eGFR) is the CKD-EPI equation.    >60.0  Comment:  Calculation used to obtain the estimated glomerular filtration  rate (eGFR) is the CKD-EPI equation.        Eos # 0.6 0.9 0.6     Eosinophil% 10.8 11.1 12.0     Glucose 97 94 77     Gran # (ANC) 2.5 4.8 2.4     Gran% 41.8 60.4 47.7     Hematocrit 45.3 45.7 43.6     Hemoglobin 14.8 14.8 14.2     Immature Grans (Abs) 0.05  Comment:  Mild elevation in immature granulocytes is non specific and   can be seen in a variety of conditions including stress response,   acute inflammation, trauma and pregnancy. Correlation with other   laboratory and clinical findings is essential.   0.04  Comment:  Mild elevation in immature granulocytes is non specific and   can be seen in a variety of conditions including stress  response,   acute inflammation, trauma and pregnancy. Correlation with other   laboratory and clinical findings is essential.   0.02  Comment:  Mild elevation in immature granulocytes is non specific and   can be seen in a variety of conditions including stress response,   acute inflammation, trauma and pregnancy. Correlation with other   laboratory and clinical findings is essential.       Immature Granulocytes 0.8 0.5 0.4     Lymph # 2.1 1.5 1.6     Lymph% 35.9 19.0 32.0     MCH 30.3 29.9 30.3     MCHC 32.7 32.4 32.6     MCV 93 92 93     Mono # 0.6 0.7 0.4     Mono% 9.7 8.1 7.1     MPV 11.0 10.6 10.3     nRBC 0 0 0     Platelets 193 206 213     Potassium 3.7 4.0 3.8     PROTEIN TOTAL 7.1 7.3 6.6     RBC 4.88 4.95 4.69     RDW 14.0 13.6 13.8     Sodium 138 135 137     WBC 5.90 8.00 5.09           Significant Imaging: I have reviewed all pertinent imaging results/findings within the past 24 hours.

## 2019-11-01 NOTE — SUBJECTIVE & OBJECTIVE
"Past Medical History:   Diagnosis Date    Alcohol abuse     Depression     History of psychiatric hospitalization     Hx of psychiatric care     Psychiatric exam requested by authority     Psychiatric problem     Suicide attempt     Therapy     Withdrawal symptoms, alcohol        No past surgical history on file.    Review of patient's allergies indicates:  No Known Allergies    Current Facility-Administered Medications   Medication    folic acid tablet 1 mg    phenobarbital injection 150.15 mg    thiamine tablet 100 mg     Current Outpatient Medications   Medication Sig    methyl salicylate-menthol 15-10% 15-10 % Crea Apply topically 3 (three) times daily.    naproxen (NAPROSYN) 500 MG tablet Take 1 tablet (500 mg total) by mouth 2 (two) times daily with meals.     Family History     Problem Relation (Age of Onset)    Alcohol abuse Paternal Grandmother    No Known Problems Mother, Father        Tobacco Use    Smoking status: Current Every Day Smoker     Packs/day: 1.00     Years: 30.00     Pack years: 30.00     Types: Cigarettes    Smokeless tobacco: Never Used   Substance and Sexual Activity    Alcohol use: Yes     Comment: drinkl " alot everyday- wine beer whatever I get my hands on"    Drug use: Yes     Types: Cocaine, Marijuana     Comment: "In the past, but not recently"    Sexual activity: Yes     Partners: Female     ROS   Per primary team note   Objective:     Vital Signs (Most Recent):  Temp: 96.6 °F (35.9 °C) (10/31/19 2020)  Pulse: (!) 123 (10/31/19 2353)  Resp: 20 (10/31/19 2353)  BP: (!) 140/86 (10/31/19 2353)  SpO2: 98 % (10/31/19 2353) Vital Signs (24h Range):  Temp:  [96.6 °F (35.9 °C)] 96.6 °F (35.9 °C)  Pulse:  [109-123] 123  Resp:  [20-22] 20  SpO2:  [98 %-100 %] 98 %  BP: (140-180)/() 140/86     Weight: 84.4 kg (186 lb)  Height: 5' 11" (180.3 cm)  Body mass index is 25.94 kg/m².      Ortho/SPM Exam     LUE:  Skin shows no scars, rashes, lesions  There is a palpable " prominence over the AC joint, but no skin tenting  No ecchymosis, erythema, or signs of cellulitis  No TTP at proximal, middle, or distal aspects of humerus, radius, or ulna  Full painless ROM of elbow and wrist  Pain is localized over the AC joint  Shoulder is grossly stable, no signs of subuxation   He is unable to perform range of motion or strength test with the shoulder due to pain  Sensation is intact to light touch throughout   2+ Radial pulse  Brisk capillary refill      Significant Labs:   CBC:   Recent Labs   Lab 10/31/19  2057   WBC 8.82   HGB 15.7   HCT 46.8        CMP:   Recent Labs   Lab 10/31/19  2057      K 3.5      CO2 20*   GLU 74   BUN 5*   CREATININE 0.7   CALCIUM 9.1   PROT 7.5   ALBUMIN 4.2   BILITOT 0.5   ALKPHOS 191*   *   ALT 99*   ANIONGAP 19*   EGFRNONAA >60.0       Significant Imaging: I have reviewed all pertinent imaging results/findings.   X-ray of the left shoulder shows a type V AC joint separation, no fractures appreciated

## 2019-11-01 NOTE — ASSESSMENT & PLAN NOTE
Alcohol use disorder, severe, dependence  Transaminitis    - CT head with no acute abnormalities.  - ETOH withdrawal order set in place.   - Will start patient on Valium 10mg TID, PRN Ativan.  - Patient admitted on 10/14/2019 with similar issues.  Hepatitis panel negative at that time, US showed hepatic steatosis.  - LFTs improved from prior labs.  - Utox also positive for benzos (received in ER) and amphetamine.  - Complete cessation recommended.
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
## 2019-11-01 NOTE — ASSESSMENT & PLAN NOTE
- X-ray showed AC joint separation of left shoulder.  - Appreciate orthopaedic's assistance.  Patient was placed in sling and is nonweightbearing to LUE.  - Patient wishes to follow-up at [facility] for possible surgery.
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
## 2019-11-01 NOTE — HPI
"Mr. Gilmar Clemente is a 44 yo male with a PMHx of alcohol abuse, chronic right shoulder pain, transaminitis presenting to the ED with left shoulder pain and tremors. Patient reports he drinks a "handle" of vodka per day. His last drink was today and he reports he has since been having multiple tremors. His tremors are persistent and improved with drinking. He has been admitted to the hospital in the past multiple times for alcohol withdrawals. He does not show any signs of wanting to quit. He denies vision changes, headaches, SOB, chest pain, diarrhea or abdominal pain.  "

## 2019-11-01 NOTE — PLAN OF CARE
AAOx4. VSS. PRN Ativan administered x2 for tremors and tachycardia with full relief. Ketorolac administered x1 PRN for pain. Full relief noted. Bed in lowest position, call light and personal items within reach. No acute needs at the moment. Patient resting comfortably. WCTM.
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
## 2019-11-01 NOTE — ASSESSMENT & PLAN NOTE
"ASSESSMENT      Gilmar Clemente is a 43 y.o. male with a past psychiatric history of severe alcohol abuse, severe recurrent major depression, substance-induced mood disorder, and suicidal ideation, who presented to [facility] on 10/31/2019 due to alcohol withdrawal and worsening L shoulder pain from injury sustained >2 weeks ago . Psychiatry was consulted for "alcohol addiction". Patient amenable to rehab and has been actively pursuing options once discharged. He is limited by his lack of identification, although has plan to be discharged to [facility], where he will receive help in acquiring an ID. Patient has called multiple facilities on resources sheet given, agreeable to follow-up as outpatient.     IMPRESSION  Alcohol use disorder, severe,  Alcohol withdrawal, current,  H/o polysubstance use (cannabis, cocaine, amphetamines),  H/o MDD, SIMD  Homelessness     RECOMMENDATION(S)      1. Scheduled Medication(s):  Complete valium taper per primary team     2. PRN Medication(s):  Valium 5mg PO q6hr PRN for any two of the following: SBP>160, DBP>100, HR>100, tremor, or diaphoresis  or   Ativan 2mg IM/1mg IV q6H if unable to tolerate PO     3. Other:  Recommended that patient attend IOP or rehabilitation for education on alcohol use disorder, for coping/behavioral skills to reinforce sobriety, as well as to attend AA.     Patient given resources sheet at bedside. Plan to be discharged to [facility] and pursue rehab acceptance once pt is able to acquire ID.    "
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
## 2019-11-01 NOTE — ASSESSMENT & PLAN NOTE
- Tobacco cessation discussed with patient for greater than 3 minutes.   - Offered tobacco patch while hospitalized  - Pt is aware and in agreement with the need to quit tobacco products   - Will need outpatient follow up with PCP for possible chantix or continued nicotine replacement products.

## 2019-11-01 NOTE — PROGRESS NOTES
Progress Note  Hospital Medicine     [place] [facility] [place] Sánchez Henson MD   Admit Date: 10/31/2019   MARIE 11/4/2019   Code status: Full Code   Principal Problem: Alcohol withdrawal syndrome without complication     INTERVAL HISTORY:   Doing okay this morning, still feeling tremulous, but did say he would like to quit, especially if he could get into an inpatient rehab facility. Consulted addiction psych as well as he has been admitted x3 for alcohol related issues.     ROS   Respiratory: negative for cough, shortness of breath   Cardiovascular: negative for chest discomfort, palpitations   Gastrointestinal: negative for nausea or vomiting, abdominal pain, constipation, diarrhea     PHYSICAL EXAM  Temp:  [96.5 °F (35.8 °C)-98.4 °F (36.9 °C)]   Pulse:  []   Resp:  [16-22]   BP: (130-180)/()   SpO2:  [97 %-100 %]      I & O (Last 24H):     Intake/Output Summary (Last 24 hours) at 11/1/2019 1308  Last data filed at 11/1/2019 1232  Gross per 24 hour   Intake 470 ml   Output 350 ml   Net 120 ml     General Appearance: no acute distress, lying in bed  Heart: regular rhythm, tachycardic  Respiratory: Normal respiratory effort, no crackles   Abdomen: Soft, non-tender; bowel sounds active   Skin: intact. IV sites ok   Neurologic: No focal numbness or weakness, but left arm in sling, slight tremor  Mental status: Alert, oriented x 4, affect appropriate, memory intact       LABS  BMP  Lab Results   Component Value Date     11/01/2019    K 3.7 11/01/2019     11/01/2019    CO2 22 (L) 11/01/2019    BUN 6 11/01/2019    CREATININE 0.8 11/01/2019    CALCIUM 8.1 (L) 11/01/2019    ANIONGAP 15 11/01/2019    ESTGFRAFRICA >60.0 11/01/2019    EGFRNONAA >60.0 11/01/2019     CBC  Recent Labs   Lab 11/01/19  0422   WBC 8.43   RBC 4.51*   HGB 13.5*   HCT 41.6      MCV 92   MCH 29.9   MCHC 32.5       Active Hospital Problems    Diagnosis  POA    *Alcohol withdrawal syndrome without complication [F10.230]  Yes     "Separation of left acromioclavicular joint [S43.102A]  Yes    Transaminitis [R74.0]  Yes     Chronic    Tobacco use disorder [F17.200]  Yes     Chronic    Alcohol use disorder, severe, dependence [F10.20]  Yes     Chronic      Resolved Hospital Problems   No resolved problems to display.        Overview/Hospital Course:  42 yo male w/ hx of alcohol abuse, chronic right shoulder pain, and transaminitis, coming in with new left shoulder pain and tremors, found to be in Alcohol withdrawal, reportedly drinking a "handle" of vodka per day. He has been admitted multiple times for alcohol withdrawal. He denied any previous seizure activity. Ortho was called on arrival to hospital he will follow up as outpt. He does state that he would like to quit drinking. Addiction Psych was called.    Assessment and Plan for problems addressed today:   * Alcohol withdrawal syndrome without complication  Alcohol use disorder, severe, dependence  Transaminitis  - CT head was negative for acute abnormalities  - ETOH withdrawal order set in place with CIWA   - Continue Valium 10mg TID, PRN Ativan.  - Patient admitted on 10/14/2019 with similar issues.  Hepatitis panel negative at that time, US showed hepatic steatosis.  - LFTs improved from prior labs.  - Utox also positive for benzos (received in ER) and amphetamine.  - discussed and counseled again on alcohol cessation   - consulted addiction psych, appreciate recs     Separation of left acromioclavicular joint  - X-ray showed AC joint separation of left shoulder.  - Ortho consulted, appreciate assistance, patient in arm sling/nonweight bearing  - Patient wishes to follow-up at [facility] for possible surgery as an outpatient      Tobacco use disorder   - nicotine patch prescribed  - Pt is aware and in agreement with the need to quit tobacco products   - Will need outpatient follow up with PCP for possible chantix or continued nicotine replacement products.    DVT PPx: SCDs/NIDA  Discharge " plan and follow up: discharge to alcohol rehab? Vs home      Sánchez Henson MD  Hospital Medicine Staff  11/01/2019

## 2019-11-01 NOTE — CONSULTS
"[facility]  Critical Care Medicine  Consult Note    Patient Name: Gilmar Clemente  MRN: [id number]  Admission Date: 10/31/2019  Hospital Length of Stay: 1 days   Code Status: Prior  Attending Physician: Sánchez Henson MD   Primary Care Provider: Primary Doctor No   Principal Problem: <principal problem not specified>    Consults  Subjective:     HPI:  Mr. Gilmar Clemente is a 42 yo male with a PMHx of alcohol abuse, chronic right shoulder pain, transaminitis presenting to the ED with left shoulder pain and tremors. Patient reports he drinks a "handle" of vodka per day. His last drink was today and he reports he has since been having multiple tremors. His tremors are persistent and improved with drinking. He has been admitted to the hospital in the past multiple times for alcohol withdrawals. He does not show any signs of wanting to quit. He denies vision changes, headaches, SOB, chest pain, diarrhea or abdominal pain.    Hospital/ICU Course:  No notes on file    Past Medical History:   Diagnosis Date    Alcohol abuse     Depression     History of psychiatric hospitalization     Hx of psychiatric care     Psychiatric exam requested by authority     Psychiatric problem     Suicide attempt     Therapy     Withdrawal symptoms, alcohol        History reviewed. No pertinent surgical history.    Review of patient's allergies indicates:  No Known Allergies    Family History     Problem Relation (Age of Onset)    Alcohol abuse Paternal Grandmother    No Known Problems Mother, Father        Tobacco Use    Smoking status: Current Every Day Smoker     Packs/day: 1.00     Years: 30.00     Pack years: 30.00     Types: Cigarettes    Smokeless tobacco: Never Used   Substance and Sexual Activity    Alcohol use: Yes     Comment: drinkl " alot everyday- wine beer whatever I get my hands on"    Drug use: Yes     Types: Cocaine, Marijuana     Comment: "In the past, but not recently"    Sexual activity: Yes     " Partners: Female      Review of Systems   Constitutional: Negative for activity change, chills, fatigue and fever.   HENT: Negative for congestion, facial swelling, sore throat and voice change.    Eyes: Negative for pain, redness and visual disturbance.   Respiratory: Negative for cough, chest tightness and shortness of breath.    Cardiovascular: Negative for chest pain and leg swelling.   Gastrointestinal: Negative for abdominal pain, constipation, diarrhea and nausea.   Endocrine: Negative for cold intolerance and heat intolerance.   Musculoskeletal: Positive for arthralgias. Negative for back pain and neck pain.   Skin: Negative for rash and wound.   Neurological: Positive for tremors and weakness. Negative for dizziness, seizures, speech difficulty, light-headedness and headaches.   Psychiatric/Behavioral: Negative for agitation and confusion.     Objective:     Vital Signs (Most Recent):  Temp: 96.6 °F (35.9 °C) (10/31/19 2020)  Pulse: 104 (11/01/19 0036)  Resp: 20 (11/01/19 0036)  BP: (!) 140/86 (11/01/19 0036)  SpO2: 99 % (11/01/19 0036) Vital Signs (24h Range):  Temp:  [96.6 °F (35.9 °C)] 96.6 °F (35.9 °C)  Pulse:  [104-123] 104  Resp:  [20-22] 20  SpO2:  [98 %-100 %] 99 %  BP: (140-180)/() 140/86   Weight: 84.4 kg (186 lb)  Body mass index is 25.94 kg/m².    No intake or output data in the 24 hours ending 11/01/19 0107    Physical Exam   Constitutional: He is oriented to person, place, and time. He appears well-developed and well-nourished.   Smell of ETOH on the patient.    HENT:   Head: Normocephalic and atraumatic.   Eyes: Pupils are equal, round, and reactive to light. EOM are normal.   Neck: Normal range of motion. Neck supple. No tracheal deviation present. No thyromegaly present.   Cardiovascular: Normal rate and regular rhythm. Exam reveals no friction rub.   No murmur heard.  Pulmonary/Chest: Effort normal and breath sounds normal. No respiratory distress. He has no wheezes. He exhibits no  tenderness.   Abdominal: Soft. Bowel sounds are normal. He exhibits no distension. There is no tenderness.   Musculoskeletal: Normal range of motion. He exhibits no edema.   Neurological: He is alert and oriented to person, place, and time.   Patient with severe tremors in all extremities. However he is oriented to person, place and time.    Skin: Skin is warm and dry. Capillary refill takes less than 2 seconds. No rash noted. No erythema.       Vents:     Lines/Drains/Airways     Peripheral Intravenous Line                 Peripheral IV - Single Lumen 18 G Right Forearm -- days         Peripheral IV - Single Lumen 10/31/19 2038 20 G Right Hand less than 1 day              Significant Labs:    CBC/Anemia Profile:  Recent Labs   Lab 10/31/19  2057   WBC 8.82   HGB 15.7   HCT 46.8      MCV 91   RDW 14.0        Chemistries:  Recent Labs   Lab 10/31/19  2057      K 3.5      CO2 20*   BUN 5*   CREATININE 0.7   CALCIUM 9.1   ALBUMIN 4.2   PROT 7.5   BILITOT 0.5   ALKPHOS 191*   ALT 99*   *   MG 1.9   PHOS 3.2           ABG  No results for input(s): PH, PO2, PCO2, HCO3, BE in the last 168 hours.  Assessment/Plan:     Psychiatric  Alcohol use disorder, severe, dependence  Patient presenting with tremors with remote hx of alcohol abuse. Multiple admissions in the past for alcohol abuse. Received benzos in the ED with improvement.     PLAN:   - Received only 5 mg Ativan in the ED  - Recommend increasing dose or switching to valium which has a longer half life  - Patient mental status normal. Safe to go to hospital medicine floor  - Benzos for CIWA >8.       Critical care was time spent personally by me on the following activities: development of treatment plan with patient or surrogate and bedside caregivers, discussions with consultants, evaluation of patient's response to treatment, examination of patient, ordering and performing treatments and interventions, ordering and review of laboratory  studies, ordering and review of radiographic studies, pulse oximetry, re-evaluation of patient's condition. This critical care time did not overlap with that of any other provider or involve time for any procedures.    Thank you for your consult. I will sign off. Please contact us if you have any additional questions.     Chano Forrest MD  Critical Care Medicine  [facility]

## 2019-11-01 NOTE — SUBJECTIVE & OBJECTIVE
"Past Medical History:   Diagnosis Date    Alcohol abuse     Depression     History of psychiatric hospitalization     Hx of psychiatric care     Psychiatric exam requested by authority     Psychiatric problem     Suicide attempt     Therapy     Withdrawal symptoms, alcohol        History reviewed. No pertinent surgical history.    Review of patient's allergies indicates:  No Known Allergies    Family History     Problem Relation (Age of Onset)    Alcohol abuse Paternal Grandmother    No Known Problems Mother, Father        Tobacco Use    Smoking status: Current Every Day Smoker     Packs/day: 1.00     Years: 30.00     Pack years: 30.00     Types: Cigarettes    Smokeless tobacco: Never Used   Substance and Sexual Activity    Alcohol use: Yes     Comment: drinkl " alot everyday- wine beer whatever I get my hands on"    Drug use: Yes     Types: Cocaine, Marijuana     Comment: "In the past, but not recently"    Sexual activity: Yes     Partners: Female      Review of Systems   Constitutional: Negative for activity change, chills, fatigue and fever.   HENT: Negative for congestion, facial swelling, sore throat and voice change.    Eyes: Negative for pain, redness and visual disturbance.   Respiratory: Negative for cough, chest tightness and shortness of breath.    Cardiovascular: Negative for chest pain and leg swelling.   Gastrointestinal: Negative for abdominal pain, constipation, diarrhea and nausea.   Endocrine: Negative for cold intolerance and heat intolerance.   Musculoskeletal: Positive for arthralgias. Negative for back pain and neck pain.   Skin: Negative for rash and wound.   Neurological: Positive for tremors and weakness. Negative for dizziness, seizures, speech difficulty, light-headedness and headaches.   Psychiatric/Behavioral: Negative for agitation and confusion.     Objective:     Vital Signs (Most Recent):  Temp: 96.6 °F (35.9 °C) (10/31/19 2020)  Pulse: 104 (11/01/19 0036)  Resp: 20 " (11/01/19 0036)  BP: (!) 140/86 (11/01/19 0036)  SpO2: 99 % (11/01/19 0036) Vital Signs (24h Range):  Temp:  [96.6 °F (35.9 °C)] 96.6 °F (35.9 °C)  Pulse:  [104-123] 104  Resp:  [20-22] 20  SpO2:  [98 %-100 %] 99 %  BP: (140-180)/() 140/86   Weight: 84.4 kg (186 lb)  Body mass index is 25.94 kg/m².    No intake or output data in the 24 hours ending 11/01/19 0107    Physical Exam   Constitutional: He is oriented to person, place, and time. He appears well-developed and well-nourished.   Smell of ETOH on the patient.    HENT:   Head: Normocephalic and atraumatic.   Eyes: Pupils are equal, round, and reactive to light. EOM are normal.   Neck: Normal range of motion. Neck supple. No tracheal deviation present. No thyromegaly present.   Cardiovascular: Normal rate and regular rhythm. Exam reveals no friction rub.   No murmur heard.  Pulmonary/Chest: Effort normal and breath sounds normal. No respiratory distress. He has no wheezes. He exhibits no tenderness.   Abdominal: Soft. Bowel sounds are normal. He exhibits no distension. There is no tenderness.   Musculoskeletal: Normal range of motion. He exhibits no edema.   Neurological: He is alert and oriented to person, place, and time.   Patient with severe tremors in all extremities. However he is oriented to person, place and time.    Skin: Skin is warm and dry. Capillary refill takes less than 2 seconds. No rash noted. No erythema.       Vents:     Lines/Drains/Airways     Peripheral Intravenous Line                 Peripheral IV - Single Lumen 18 G Right Forearm -- days         Peripheral IV - Single Lumen 10/31/19 2038 20 G Right Hand less than 1 day              Significant Labs:    CBC/Anemia Profile:  Recent Labs   Lab 10/31/19  2057   WBC 8.82   HGB 15.7   HCT 46.8      MCV 91   RDW 14.0        Chemistries:  Recent Labs   Lab 10/31/19  2057      K 3.5      CO2 20*   BUN 5*   CREATININE 0.7   CALCIUM 9.1   ALBUMIN 4.2   PROT 7.5   BILITOT  0.5   ALKPHOS 191*   ALT 99*   *   MG 1.9   PHOS 3.2
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
## 2019-11-01 NOTE — SUBJECTIVE & OBJECTIVE
"Past Medical History:   Diagnosis Date    Alcohol abuse     Depression     History of psychiatric hospitalization     Hx of psychiatric care     Psychiatric exam requested by authority     Psychiatric problem     Suicide attempt     Therapy     Withdrawal symptoms, alcohol        History reviewed. No pertinent surgical history.    Review of patient's allergies indicates:  No Known Allergies    No current facility-administered medications on file prior to encounter.      Current Outpatient Medications on File Prior to Encounter   Medication Sig    methyl salicylate-menthol 15-10% 15-10 % Crea Apply topically 3 (three) times daily.    naproxen (NAPROSYN) 500 MG tablet Take 1 tablet (500 mg total) by mouth 2 (two) times daily with meals.     Family History     Problem Relation (Age of Onset)    Alcohol abuse Paternal Grandmother    No Known Problems Mother, Father        Tobacco Use    Smoking status: Current Every Day Smoker     Packs/day: 1.00     Years: 30.00     Pack years: 30.00     Types: Cigarettes    Smokeless tobacco: Never Used   Substance and Sexual Activity    Alcohol use: Yes     Comment: drinkl " alot everyday- wine beer whatever I get my hands on"    Drug use: Yes     Types: Cocaine, Marijuana     Comment: "In the past, but not recently"    Sexual activity: Yes     Partners: Female     Review of Systems   Constitutional: Negative for activity change, appetite change, chills, diaphoresis, fatigue, fever and unexpected weight change.   HENT: Negative for congestion, rhinorrhea, sore throat, trouble swallowing and voice change.    Eyes: Negative for visual disturbance.   Respiratory: Negative for cough, choking, chest tightness, shortness of breath and wheezing.    Cardiovascular: Negative for chest pain, palpitations and leg swelling.   Gastrointestinal: Positive for nausea. Negative for abdominal distention, abdominal pain, anal bleeding, blood in stool, constipation, diarrhea and " vomiting.   Endocrine: Negative for cold intolerance, heat intolerance, polydipsia and polyuria.   Genitourinary: Negative for dysuria, flank pain, frequency, hematuria and urgency.   Musculoskeletal: Positive for arthralgias. Negative for back pain, joint swelling and myalgias.   Skin: Negative for color change and rash.   Neurological: Negative for dizziness, seizures, syncope, facial asymmetry, speech difficulty, weakness, light-headedness, numbness and headaches.   Hematological: Negative for adenopathy. Does not bruise/bleed easily.   Psychiatric/Behavioral: Negative for agitation, confusion, hallucinations and suicidal ideas.     Objective:     Vital Signs (Most Recent):  Temp: 96.6 °F (35.9 °C) (10/31/19 2020)  Pulse: 104 (11/01/19 0036)  Resp: 20 (11/01/19 0036)  BP: (!) 140/86 (11/01/19 0036)  SpO2: 99 % (11/01/19 0036) Vital Signs (24h Range):  Temp:  [96.6 °F (35.9 °C)] 96.6 °F (35.9 °C)  Pulse:  [104-123] 104  Resp:  [20-22] 20  SpO2:  [98 %-100 %] 99 %  BP: (140-180)/() 140/86     Weight: 84.4 kg (186 lb)  Body mass index is 25.94 kg/m².    Physical Exam   Constitutional: He is oriented to person, place, and time. He appears well-developed and well-nourished. No distress.   HENT:   Head: Normocephalic and atraumatic.   Eyes: Pupils are equal, round, and reactive to light.   Neck: Neck supple. Carotid bruit is not present. No thyromegaly present.   Cardiovascular: Normal rate and regular rhythm. Exam reveals no gallop.   No murmur heard.  Pulmonary/Chest: Effort normal and breath sounds normal. No respiratory distress. He has no wheezes.   Abdominal: Bowel sounds are normal. He exhibits no distension. There is no splenomegaly or hepatomegaly. There is no tenderness.   Musculoskeletal: Normal range of motion. He exhibits no edema.   Neurological: He is alert and oriented to person, place, and time. No cranial nerve deficit or sensory deficit.   Skin: Skin is warm and dry. No rash noted.    Psychiatric: He has a normal mood and affect. His behavior is normal.         CRANIAL NERVES     CN III, IV, VI   Pupils are equal, round, and reactive to light.       Significant Labs:   CBC:   Recent Labs   Lab 10/31/19  2057   WBC 8.82   HGB 15.7   HCT 46.8        CMP:   Recent Labs   Lab 10/31/19  2057      K 3.5      CO2 20*   GLU 74   BUN 5*   CREATININE 0.7   CALCIUM 9.1   PROT 7.5   ALBUMIN 4.2   BILITOT 0.5   ALKPHOS 191*   *   ALT 99*   ANIONGAP 19*   EGFRNONAA >60.0     Urine Studies: No results for input(s): COLORU, APPEARANCEUA, PHUR, SPECGRAV, PROTEINUA, GLUCUA, KETONESU, BILIRUBINUA, OCCULTUA, NITRITE, UROBILINOGEN, LEUKOCYTESUR, RBCUA, WBCUA, BACTERIA, SQUAMEPITHEL, HYALINECASTS in the last 48 hours.    Invalid input(s): THALIA    Significant Imaging: I have reviewed all pertinent imaging results/findings within the past 24 hours.

## 2019-11-01 NOTE — ASSESSMENT & PLAN NOTE
Patient presenting with tremors with remote hx of alcohol abuse. Multiple admissions in the past for alcohol abuse. Received benzos in the ED with improvement.     PLAN:   - Received only 5 mg Ativan in the ED  - Recommend increasing dose or switching to valium which has a longer half life  - Patient mental status normal. Safe to go to hospital medicine floor  - Benzos for CIWA >8.

## 2019-11-01 NOTE — CONSULTS
Pt was seen and examined.    Full consult note to follow.          Steffanie Culp MD  Internal Medicine  PGY-3
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
## 2019-11-01 NOTE — PLAN OF CARE
CM met with patient to obtain discharge planning assessment.  Patient admitted with alcohol withdrawal.  Patient states he is homeless and unemployed and wants to go to a facility to get better out of this area as this is not a good place for him.  Planned discharge is a facility for alcohol rehabilitation - Plan (A) or shelter - Plan (B).    Patient stated that he had previously been to [facility] in [facility] and would consider going back to this facility.     PCP:  Primary Doctor No     Payor:  Payor: MEDICAID / Plan: [place] / Product Type: Managed Medicaid /      Pharmacy:    [id number] - [place], [place] - [address]  [address]  [place] [address]  Phone: [phone] Fax: [phone]       11/01/19 1534   Discharge Assessment   Assessment Type Discharge Planning Assessment   Confirmed/corrected address and phone number on facesheet? Yes   Assessment information obtained from? Patient   Expected Length of Stay (days) 4   Communicated expected length of stay with patient/caregiver yes   Prior to hospitilization cognitive status: Alert/Oriented   Prior to hospitalization functional status: Independent   Current cognitive status: Alert/Oriented   Current Functional Status: Independent   Lives With other (see comments)  (homeless)   Able to Return to Prior Arrangements other (see comments)  (homeless)   Is patient able to care for self after discharge? Yes   Patient's perception of discharge disposition other (comments)  (an alcohol rehab facility)   Readmission Within the Last 30 Days previous discharge plan unsuccessful   If yes, most recent facility name: [facility]   Patient currently being followed by outpatient case management? No   Patient currently receives any other outside agency services? No   Equipment Currently Used at Home none   Do you have any problems affording any of your prescribed medications? No   Is the patient taking medications as  prescribed? yes   Does the patient have transportation home? No   Does the patient receive services at the [facility]? No   Discharge Plan A Other  (alcohol rehab facility)   Discharge Plan B Shelter   DME Needed Upon Discharge  none   Patient/Family in Agreement with Plan no   Readmission Questionnaire   At the time of your discharge, did someone talk to you about what your health problems were? Yes   At the time of discharge, did someone talk to you about what to watch out for regarding worsening of your health problem? Yes   At the time of discharge, did someone talk to you about what to do if you experienced worsening of your health problem? Yes   At the time of discharge, did someone talk to you about which medication to take when you left the hospital and which ones to stop taking? Yes   At the time of discharge, did someone talk to you about when and where to follow up with a doctor after you left the hospital? Yes   What do you believe caused you to be sick enough to be re-admitted? etoh abuse   How often do you need to have someone help you when you read instructions, pamphlets, or other written material from your doctor or pharmacy? Sometimes   Do you have problems taking your medications as prescribed? No   Do you have any problems affording any of  your prescribed medications? To be determined   Do you have problems obtaining/receiving your medications? No   Does the patient have transportation to healthcare appointments? No   Living Arrangements homeless   Does the patient have family/friends to help with healtcare needs after discharge? no   Does your caregiver provide all the help you need? I don't know
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
## 2019-11-02 PROBLEM — R63.0 POOR APPETITE: Status: ACTIVE | Noted: 2019-11-02

## 2019-11-02 PROBLEM — Z59.00 HOMELESSNESS: Status: ACTIVE | Noted: 2019-11-02

## 2019-11-02 LAB
ALBUMIN SERPL BCP-MCNC: 3.3 G/DL (ref 3.5–5.2)
ALP SERPL-CCNC: 175 U/L (ref 55–135)
ALT SERPL W/O P-5'-P-CCNC: 75 U/L (ref 10–44)
ANION GAP SERPL CALC-SCNC: 13 MMOL/L (ref 8–16)
AST SERPL-CCNC: 94 U/L (ref 10–40)
BASOPHILS # BLD AUTO: 0.04 K/UL (ref 0–0.2)
BASOPHILS NFR BLD: 0.8 % (ref 0–1.9)
BILIRUB SERPL-MCNC: 1 MG/DL (ref 0.1–1)
BUN SERPL-MCNC: 5 MG/DL (ref 6–20)
CALCIUM SERPL-MCNC: 8.4 MG/DL (ref 8.7–10.5)
CHLORIDE SERPL-SCNC: 101 MMOL/L (ref 95–110)
CO2 SERPL-SCNC: 21 MMOL/L (ref 23–29)
CREAT SERPL-MCNC: 0.8 MG/DL (ref 0.5–1.4)
DIFFERENTIAL METHOD: ABNORMAL
EOSINOPHIL # BLD AUTO: 0.6 K/UL (ref 0–0.5)
EOSINOPHIL NFR BLD: 11.5 % (ref 0–8)
ERYTHROCYTE [DISTWIDTH] IN BLOOD BY AUTOMATED COUNT: 13.7 % (ref 11.5–14.5)
EST. GFR  (AFRICAN AMERICAN): >60 ML/MIN/1.73 M^2
EST. GFR  (NON AFRICAN AMERICAN): >60 ML/MIN/1.73 M^2
GLUCOSE SERPL-MCNC: 68 MG/DL (ref 70–110)
HCT VFR BLD AUTO: 41.9 % (ref 40–54)
HGB BLD-MCNC: 14 G/DL (ref 14–18)
IMM GRANULOCYTES # BLD AUTO: 0.01 K/UL (ref 0–0.04)
IMM GRANULOCYTES NFR BLD AUTO: 0.2 % (ref 0–0.5)
LYMPHOCYTES # BLD AUTO: 1.6 K/UL (ref 1–4.8)
LYMPHOCYTES NFR BLD: 31.2 % (ref 18–48)
MCH RBC QN AUTO: 30.8 PG (ref 27–31)
MCHC RBC AUTO-ENTMCNC: 33.4 G/DL (ref 32–36)
MCV RBC AUTO: 92 FL (ref 82–98)
MONOCYTES # BLD AUTO: 0.3 K/UL (ref 0.3–1)
MONOCYTES NFR BLD: 6.4 % (ref 4–15)
NEUTROPHILS # BLD AUTO: 2.5 K/UL (ref 1.8–7.7)
NEUTROPHILS NFR BLD: 49.9 % (ref 38–73)
NRBC BLD-RTO: 0 /100 WBC
PLATELET # BLD AUTO: 225 K/UL (ref 150–350)
PMV BLD AUTO: 10.5 FL (ref 9.2–12.9)
POTASSIUM SERPL-SCNC: 3.3 MMOL/L (ref 3.5–5.1)
PROT SERPL-MCNC: 6.6 G/DL (ref 6–8.4)
RBC # BLD AUTO: 4.55 M/UL (ref 4.6–6.2)
SODIUM SERPL-SCNC: 135 MMOL/L (ref 136–145)
WBC # BLD AUTO: 4.97 K/UL (ref 3.9–12.7)

## 2019-11-02 PROCEDURE — 63600175 PHARM REV CODE 636 W HCPCS: Performed by: HOSPITALIST

## 2019-11-02 PROCEDURE — 99232 SBSQ HOSP IP/OBS MODERATE 35: CPT | Mod: ,,, | Performed by: INTERNAL MEDICINE

## 2019-11-02 PROCEDURE — 25000003 PHARM REV CODE 250: Performed by: INTERNAL MEDICINE

## 2019-11-02 PROCEDURE — 85025 COMPLETE CBC W/AUTO DIFF WBC: CPT

## 2019-11-02 PROCEDURE — 25000003 PHARM REV CODE 250: Performed by: HOSPITALIST

## 2019-11-02 PROCEDURE — 25000003 PHARM REV CODE 250: Performed by: PHYSICIAN ASSISTANT

## 2019-11-02 PROCEDURE — 36415 COLL VENOUS BLD VENIPUNCTURE: CPT

## 2019-11-02 PROCEDURE — 80053 COMPREHEN METABOLIC PANEL: CPT

## 2019-11-02 PROCEDURE — 99232 PR SUBSEQUENT HOSPITAL CARE,LEVL II: ICD-10-PCS | Mod: ,,, | Performed by: INTERNAL MEDICINE

## 2019-11-02 PROCEDURE — 20600001 HC STEP DOWN PRIVATE ROOM

## 2019-11-02 PROCEDURE — S4991 NICOTINE PATCH NONLEGEND: HCPCS | Performed by: PHYSICIAN ASSISTANT

## 2019-11-02 RX ORDER — HYDRALAZINE HYDROCHLORIDE 25 MG/1
25 TABLET, FILM COATED ORAL EVERY 8 HOURS PRN
Status: DISCONTINUED | OUTPATIENT
Start: 2019-11-02 | End: 2019-11-05 | Stop reason: HOSPADM

## 2019-11-02 RX ORDER — POTASSIUM CHLORIDE 20 MEQ/1
40 TABLET, EXTENDED RELEASE ORAL ONCE
Status: COMPLETED | OUTPATIENT
Start: 2019-11-02 | End: 2019-11-02

## 2019-11-02 RX ADMIN — Medication 100 MG: at 08:11

## 2019-11-02 RX ADMIN — FOLIC ACID 1 MG: 1 TABLET ORAL at 08:11

## 2019-11-02 RX ADMIN — NICOTINE 1 PATCH: 21 PATCH, EXTENDED RELEASE TRANSDERMAL at 09:11

## 2019-11-02 RX ADMIN — DIAZEPAM 10 MG: 5 TABLET ORAL at 11:11

## 2019-11-02 RX ADMIN — HYDRALAZINE HYDROCHLORIDE 25 MG: 25 TABLET, FILM COATED ORAL at 07:11

## 2019-11-02 RX ADMIN — DIAZEPAM 10 MG: 5 TABLET ORAL at 01:11

## 2019-11-02 RX ADMIN — DIAZEPAM 10 MG: 5 TABLET ORAL at 06:11

## 2019-11-02 RX ADMIN — SENNOSIDES AND DOCUSATE SODIUM 1 TABLET: 8.6; 5 TABLET ORAL at 07:11

## 2019-11-02 RX ADMIN — KETOROLAC TROMETHAMINE 15 MG: 15 INJECTION, SOLUTION INTRAMUSCULAR; INTRAVENOUS at 07:11

## 2019-11-02 RX ADMIN — POTASSIUM CHLORIDE 40 MEQ: 1500 TABLET, EXTENDED RELEASE ORAL at 09:11

## 2019-11-02 RX ADMIN — SENNOSIDES AND DOCUSATE SODIUM 1 TABLET: 8.6; 5 TABLET ORAL at 08:11

## 2019-11-02 RX ADMIN — DIAZEPAM 10 MG: 5 TABLET ORAL at 05:11

## 2019-11-02 RX ADMIN — DIAZEPAM 5 MG: 5 TABLET ORAL at 07:11

## 2019-11-02 RX ADMIN — KETOROLAC TROMETHAMINE 15 MG: 15 INJECTION, SOLUTION INTRAMUSCULAR; INTRAVENOUS at 11:11

## 2019-11-02 RX ADMIN — THERA TABS 1 TABLET: TAB at 08:11

## 2019-11-02 RX ADMIN — RAMELTEON 8 MG: 8 TABLET ORAL at 11:11

## 2019-11-02 NOTE — PLAN OF CARE
POC reviewed at bedside with patient. Questions and concerns addressed. VSS. CIWA elevated requiring extra 5mg po Valium. Up to take shower this shift. [place]. Well. Safety maintained. Bed in low and locked position. Call light within reach. Side rails up x2. Frequent rounds.

## 2019-11-02 NOTE — PLAN OF CARE
POC reviewed with patient. Address questions and concerns. AAOX4. VVS. Elevated blood pressure prn hydralazine >160. Safety maintained. Free from falls. WCTM

## 2019-11-02 NOTE — PLAN OF CARE
Problem: Adult Inpatient Plan of Care  Goal: Plan of Care Review  Outcome: Ongoing, Progressing  Flowsheets (Taken 11/1/2019 1928)  Plan of Care Reviewed With: patient     No acute changes on shift. Pt alert and oriented. Elevated CIWA scores throughout shift. Patient given PRN ativan throughout shift with little relief. Pt c/o mild pain to LUE. Poor PO intake d/t nausea. Good urine output. No other changes. Pt updated on POC. WCTM.

## 2019-11-02 NOTE — PROGRESS NOTES
"Progress Note  Hospital Medicine     [place]: [facility] O Sánchez Henson MD   Admit Date: 10/31/2019   MARIE 11/4/2019   Code status: Full Code   Principal Problem: Alcohol withdrawal syndrome without complication     INTERVAL HISTORY:  Doing good this morning, still feeling aches all over his body, feels like he "really messed up" his body by drinking alcohol. He did feel like the higher dose of benzo helped him sleep better.  He states he hasn't really eaten anything substantial for quite some time, he thinks the broth he ate yesterday was something he could handle.     ROS   Respiratory: negative for cough, shortness of breath   Cardiovascular: negative for chest discomfort, palpitations   Gastrointestinal: negative for nausea or vomiting, abdominal pain, constipation, diarrhea     PHYSICAL EXAM  Temp:  [97.5 °F (36.4 °C)-98.7 °F (37.1 °C)]   Pulse:  []   Resp:  [17-20]   BP: (130-170)/(76-91)   SpO2:  [97 %-99 %]      I & O (Last 24H):     Intake/Output Summary (Last 24 hours) at 11/2/2019 1014  Last data filed at 11/2/2019 0800  Gross per 24 hour   Intake 1030 ml   Output 1500 ml   Net -470 ml     General Appearance: no acute distress, lying in bed, sweaty appearance  Heart: regular rhythm, tachycardic  Respiratory: Normal respiratory effort, no crackles   Abdomen: Soft, non-tender; bowel sounds active   Skin: intact. IV sites ok   Neurologic: No focal numbness or weakness, but left arm in sling, slight tremor  Mental status: Alert, oriented x 4, affect appropriate, memory intact     LABS  BMP  Lab Results   Component Value Date     (L) 11/02/2019    K 3.3 (L) 11/02/2019     11/02/2019    CO2 21 (L) 11/02/2019    BUN 5 (L) 11/02/2019    CREATININE 0.8 11/02/2019    CALCIUM 8.4 (L) 11/02/2019    ANIONGAP 13 11/02/2019    ESTGFRAFRICA >60.0 11/02/2019    EGFRNONAA >60.0 11/02/2019     CBC  Recent Labs   Lab 11/02/19  0438   WBC 4.97   RBC 4.55*   HGB 14.0   HCT 41.9      MCV 92   MCH 30.8 " "  [facility] 33.4       Active Hospital Problems    Diagnosis  POA    *Alcohol withdrawal syndrome without complication [F10.230]  Yes    Separation of left acromioclavicular joint [S43.102A]  Yes    Transaminitis [R74.0]  Yes     Chronic    Tobacco use disorder [F17.200]  Yes     Chronic    Alcohol use disorder, severe, dependence [F10.20]  Yes     Chronic      Resolved Hospital Problems   No resolved problems to display.        Overview/Hospital Course:  44 yo male w/ hx of alcohol abuse, chronic right shoulder pain, and transaminitis, coming in with new left shoulder pain and tremors, found to be in Alcohol withdrawal, reportedly drinking a "handle" of vodka per day. He has been admitted multiple times for alcohol withdrawal. He denied any previous seizure activity. Ortho was called on arrival to hospital he will follow up as outpt. He does state that he would like to quit drinking. Addiction Psych was called.    Assessment and Plan for problems addressed today:   * Alcohol withdrawal syndrome without complication  Alcohol use disorder, severe, dependence  Transaminitis  - CT head was negative for acute abnormalities  - ETOH withdrawal order set in place with CIWA   - Continue Valium 10mg q6, PRN Ativan. Taper as needed  - Patient admitted on 10/14/2019 with similar issues.  Hepatitis panel negative at that time, US showed hepatic steatosis.  - LFTs improved from prior labs.  - Utox also positive for benzos (received in ER) and amphetamine.  - discussed and counseled again on alcohol cessation   - consulted addiction psych, appreciate recs     Separation of left acromioclavicular joint  - X-ray showed AC joint separation of left shoulder.  - Ortho consulted, appreciate assistance, patient in arm sling/nonweight bearing  - Patient wishes to follow-up at [facility] for possible surgery as an outpatient      Tobacco use disorder   - nicotine patch prescribed  - Pt is aware and in agreement with the need to quit tobacco " products   - Will need outpatient follow up with PCP for possible chantix or continued nicotine replacement products.    Homelessness- looking into inpatient alcohol rehab  Poor appetite- likely 2/2 withdrawal will change diet to clear liquids and uptitrate as tolerated  Hypokalemia- likely 2/2 poor intake, will replace PO      DVT PPx: SCDs/NIDA  Discharge plan and follow up: discharge to alcohol rehab?       Sánchez Henson MD  Hospital Medicine Staff  11/02/2019

## 2019-11-03 LAB
ALBUMIN SERPL BCP-MCNC: 3.2 G/DL (ref 3.5–5.2)
ALP SERPL-CCNC: 166 U/L (ref 55–135)
ALT SERPL W/O P-5'-P-CCNC: 79 U/L (ref 10–44)
ANION GAP SERPL CALC-SCNC: 10 MMOL/L (ref 8–16)
AST SERPL-CCNC: 78 U/L (ref 10–40)
BASOPHILS # BLD AUTO: 0.04 K/UL (ref 0–0.2)
BASOPHILS NFR BLD: 0.8 % (ref 0–1.9)
BILIRUB SERPL-MCNC: 0.7 MG/DL (ref 0.1–1)
BUN SERPL-MCNC: 5 MG/DL (ref 6–20)
CALCIUM SERPL-MCNC: 8.6 MG/DL (ref 8.7–10.5)
CHLORIDE SERPL-SCNC: 105 MMOL/L (ref 95–110)
CO2 SERPL-SCNC: 22 MMOL/L (ref 23–29)
CREAT SERPL-MCNC: 0.7 MG/DL (ref 0.5–1.4)
DIFFERENTIAL METHOD: ABNORMAL
EOSINOPHIL # BLD AUTO: 0.6 K/UL (ref 0–0.5)
EOSINOPHIL NFR BLD: 12 % (ref 0–8)
ERYTHROCYTE [DISTWIDTH] IN BLOOD BY AUTOMATED COUNT: 13.8 % (ref 11.5–14.5)
EST. GFR  (AFRICAN AMERICAN): >60 ML/MIN/1.73 M^2
EST. GFR  (NON AFRICAN AMERICAN): >60 ML/MIN/1.73 M^2
GLUCOSE SERPL-MCNC: 77 MG/DL (ref 70–110)
HCT VFR BLD AUTO: 43.6 % (ref 40–54)
HGB BLD-MCNC: 14.2 G/DL (ref 14–18)
IMM GRANULOCYTES # BLD AUTO: 0.02 K/UL (ref 0–0.04)
IMM GRANULOCYTES NFR BLD AUTO: 0.4 % (ref 0–0.5)
LYMPHOCYTES # BLD AUTO: 1.6 K/UL (ref 1–4.8)
LYMPHOCYTES NFR BLD: 32 % (ref 18–48)
MCH RBC QN AUTO: 30.3 PG (ref 27–31)
MCHC RBC AUTO-ENTMCNC: 32.6 G/DL (ref 32–36)
MCV RBC AUTO: 93 FL (ref 82–98)
MONOCYTES # BLD AUTO: 0.4 K/UL (ref 0.3–1)
MONOCYTES NFR BLD: 7.1 % (ref 4–15)
NEUTROPHILS # BLD AUTO: 2.4 K/UL (ref 1.8–7.7)
NEUTROPHILS NFR BLD: 47.7 % (ref 38–73)
NRBC BLD-RTO: 0 /100 WBC
PLATELET # BLD AUTO: 213 K/UL (ref 150–350)
PMV BLD AUTO: 10.3 FL (ref 9.2–12.9)
POTASSIUM SERPL-SCNC: 3.8 MMOL/L (ref 3.5–5.1)
PROT SERPL-MCNC: 6.6 G/DL (ref 6–8.4)
RBC # BLD AUTO: 4.69 M/UL (ref 4.6–6.2)
SODIUM SERPL-SCNC: 137 MMOL/L (ref 136–145)
WBC # BLD AUTO: 5.09 K/UL (ref 3.9–12.7)

## 2019-11-03 PROCEDURE — 25000003 PHARM REV CODE 250: Performed by: INTERNAL MEDICINE

## 2019-11-03 PROCEDURE — 80053 COMPREHEN METABOLIC PANEL: CPT

## 2019-11-03 PROCEDURE — 99232 SBSQ HOSP IP/OBS MODERATE 35: CPT | Mod: ,,, | Performed by: HOSPITALIST

## 2019-11-03 PROCEDURE — 99232 PR SUBSEQUENT HOSPITAL CARE,LEVL II: ICD-10-PCS | Mod: ,,, | Performed by: HOSPITALIST

## 2019-11-03 PROCEDURE — 36415 COLL VENOUS BLD VENIPUNCTURE: CPT

## 2019-11-03 PROCEDURE — 25000003 PHARM REV CODE 250: Performed by: PHYSICIAN ASSISTANT

## 2019-11-03 PROCEDURE — 25000003 PHARM REV CODE 250: Performed by: HOSPITALIST

## 2019-11-03 PROCEDURE — 20600001 HC STEP DOWN PRIVATE ROOM

## 2019-11-03 PROCEDURE — 85025 COMPLETE CBC W/AUTO DIFF WBC: CPT

## 2019-11-03 PROCEDURE — 63600175 PHARM REV CODE 636 W HCPCS: Performed by: INTERNAL MEDICINE

## 2019-11-03 PROCEDURE — S4991 NICOTINE PATCH NONLEGEND: HCPCS | Performed by: PHYSICIAN ASSISTANT

## 2019-11-03 RX ORDER — DIAZEPAM 5 MG/1
5 TABLET ORAL EVERY 6 HOURS
Status: DISCONTINUED | OUTPATIENT
Start: 2019-11-03 | End: 2019-11-03

## 2019-11-03 RX ORDER — LISINOPRIL 5 MG/1
10 TABLET ORAL DAILY
Status: DISCONTINUED | OUTPATIENT
Start: 2019-11-03 | End: 2019-11-05

## 2019-11-03 RX ORDER — DIAZEPAM 5 MG/1
10 TABLET ORAL EVERY 6 HOURS
Status: DISCONTINUED | OUTPATIENT
Start: 2019-11-03 | End: 2019-11-04

## 2019-11-03 RX ADMIN — DIAZEPAM 10 MG: 5 TABLET ORAL at 11:11

## 2019-11-03 RX ADMIN — NICOTINE 1 PATCH: 21 PATCH, EXTENDED RELEASE TRANSDERMAL at 09:11

## 2019-11-03 RX ADMIN — LISINOPRIL 10 MG: 5 TABLET ORAL at 10:11

## 2019-11-03 RX ADMIN — SENNOSIDES AND DOCUSATE SODIUM 1 TABLET: 8.6; 5 TABLET ORAL at 09:11

## 2019-11-03 RX ADMIN — THERA TABS 1 TABLET: TAB at 08:11

## 2019-11-03 RX ADMIN — DIAZEPAM 10 MG: 5 TABLET ORAL at 05:11

## 2019-11-03 RX ADMIN — RAMELTEON 8 MG: 8 TABLET ORAL at 09:11

## 2019-11-03 RX ADMIN — SENNOSIDES AND DOCUSATE SODIUM 1 TABLET: 8.6; 5 TABLET ORAL at 08:11

## 2019-11-03 RX ADMIN — FOLIC ACID 1 MG: 1 TABLET ORAL at 08:11

## 2019-11-03 RX ADMIN — DIAZEPAM 10 MG: 5 TABLET ORAL at 12:11

## 2019-11-03 RX ADMIN — Medication 100 MG: at 08:11

## 2019-11-03 RX ADMIN — DIAZEPAM 10 MG: 5 TABLET ORAL at 06:11

## 2019-11-03 RX ADMIN — LORAZEPAM 2 MG: 2 INJECTION INTRAMUSCULAR; INTRAVENOUS at 12:11

## 2019-11-03 RX ADMIN — DIAZEPAM 5 MG: 5 TABLET ORAL at 08:11

## 2019-11-03 NOTE — PLAN OF CARE
PT IS A 43 YOA MALE ADMITTED WITH ALCOHOL WITHDRAWAL SYMPTOMS. PT IS ALERT AND ORIENTED X 2 SKIN W/D TO TOUCH. BBS CLEAR. ABD ROUND AND SOFT. PT C/O PAIN TO LEFT SHOULDER FROM A SHOULDER SEPARATION, BUT STATED THAT IT ONLY HURTS WHEN HE LAYS ON THAT SIDE. PLANS OF CARE ARE ONGOING. THE PATIENT WAS ADVANCED FROM A CLD TO A REGULAR DIET THIS AM.   Problem: Fall Injury Risk  Goal: Absence of Fall and Fall-Related Injury  Outcome: Ongoing, Progressing     Problem: Adult Inpatient Plan of Care  Goal: Optimal Comfort and Wellbeing  Outcome: Ongoing, Progressing     Problem: Skin Injury Risk Increased  Goal: Skin Health and Integrity  Outcome: Ongoing, Progressing

## 2019-11-03 NOTE — PROGRESS NOTES
"Progress Note  Hospital Medicine     Team: [facility] HOSP MED K Sophie Clay MD   Admit Date: 10/31/2019   Code status: Full Code   Principal Problem: Alcohol withdrawal syndrome without complication     INTERVAL HISTORY:  Reports feeling like his withdrawal signs have been worse with more tremors and BP was elevated for most of the day yesterday and overnight up to 170s systolics. He says usually withdrawal day 3-4 is his "worst". He does feel like hes done better with food intake so would like to advance diet to regular today. He wanted to go back to ativan and discussed at length reasons for doing valium scheduled with breakthough shorter acting benzo for acute signs of withdrawal in between scheduled longer acting mediations to prevent body reaction to lack of alcohol, as he is used to drinking a handle of vodka a day. He says it is very hard to not just go out to drink as he knows that will make all this feel better. We discussed at length with counseling what happens if you have cirrhosis from drinking which is where he will be headed if he continues which includes bleeding varices/ascites and frequent hospital admits if he continues on this path. He states he didn't know those were things that happen long term and he plans to move out of [place] to get away from alcohol as long term plan. AA resources at bedside. He said tourists give him alcohol so he can be homeless and drinking 24/7 free in the street here and this is why he has to move, I agreed with this plan this seems best idea for him to avoid temptation. Considered escalating to 5 mg taper as he has been on 10 mg longer than normal alcohol withdrawal protocols but discussed with nusring and still significant signs on exam of withdrawal subjective and objectively so will change back to 10 mg one more day with plans to go down to 5 mg tomorrow likely.     ROS   Respiratory: negative for cough, shortness of breath   Cardiovascular: negative for chest " discomfort, palpitations   Gastrointestinal: negative for nausea or vomiting, abdominal pain, constipation, diarrhea     PHYSICAL EXAM  Temp:  [97 °F (36.1 °C)-98.1 °F (36.7 °C)]   Pulse:  []   Resp:  [16-18]   BP: (136-178)/(89-98)   SpO2:  [92 %-98 %]      I & O (Last 24H):     Intake/Output Summary (Last 24 hours) at 11/3/2019 1124  Last data filed at 11/3/2019 0604  Gross per 24 hour   Intake 1440 ml   Output 1 ml   Net 1439 ml     General Appearance: no acute distress, lying in bed, appears mildly anxious, no visible tremors  Heart: regular rhythm, HR 90s.  Respiratory: Normal respiratory effort, no crackles   Abdomen: Soft, non-tender; bowel sounds active   Skin: intact. IV sites ok   Neurologic: No focal numbness or weakness, but left arm in sling, slight tremor  Mental status: Alert, oriented x 4, affect appropriate, memory intact     LABS  BMP  Lab Results   Component Value Date     11/03/2019    K 3.8 11/03/2019     11/03/2019    CO2 22 (L) 11/03/2019    BUN 5 (L) 11/03/2019    CREATININE 0.7 11/03/2019    CALCIUM 8.6 (L) 11/03/2019    ANIONGAP 10 11/03/2019    ESTGFRAFRICA >60.0 11/03/2019    EGFRNONAA >60.0 11/03/2019     CBC  Recent Labs   Lab 11/03/19  0402   WBC 5.09   RBC 4.69   HGB 14.2   HCT 43.6      MCV 93   MCH 30.3   MCHC 32.6       Active Hospital Problems    Diagnosis  POA    *Alcohol withdrawal syndrome without complication [F10.230]  Yes    Homelessness [Z59.0]  Not Applicable    Poor appetite [R63.0]  Yes    Separation of left acromioclavicular joint [S43.102A]  Yes    Transaminitis [R74.0]  Yes     Chronic    Tobacco use disorder [F17.200]  Yes     Chronic    Alcohol use disorder, severe, dependence [F10.20]  Yes     Chronic      Resolved Hospital Problems   No resolved problems to display.        Overview/Hospital Course:  44 yo male w/ hx of alcohol abuse, chronic right shoulder pain, and transaminitis, coming in with new left shoulder pain and tremors,  "found to be in Alcohol withdrawal, reportedly drinking a "handle" of vodka per day. He has been admitted multiple times for alcohol withdrawal. He denied any previous seizure activity. Ortho was called on arrival to hospital he will follow up as outpt. He does state that he would like to quit drinking. Addiction Psych was called.    Assessment and Plan for problems addressed today:   * Alcohol withdrawal syndrome without complication  Alcohol use disorder, severe, dependence  Transaminitis  - CT head was negative for acute abnormalities  - ETOH withdrawal order set in place with [place]   - Continue Valium 10mg q6, PRN Ativan. Still significant subjective/objective symptoms (BP up, HR up, tremors and reports shaking) on 11/3, so will adjust plans to go to 5 mg and keep 10 mg today, plan to change to 5 mg tomorrow q6 x 8 doses.  - Patient admitted on 10/14/2019 with similar issues.  Hepatitis panel negative at that time, US showed hepatic steatosis.  - LFTs improved from prior labs.  - Utox also positive for benzos (received in ER) and amphetamine.  - discussed and counseled again on alcohol cessation at length. Resources at bedside. He plans to move to another city to get away from [place] tempt[place] of [place] and alcohol  - consulted addiction psych, appreciate recs     Separation of left acromioclavicular joint  - X-ray showed AC joint separation of left shoulder.  - Ortho consulted, appreciate assistance, patient in arm sling/nonweight bearing  - Patient wishes to follow-up at [facility] for possible surgery as an outpatient      Tobacco use disorder   - nicotine patch prescribed  - Pt is aware and in agreement with the need to quit tobacco products   - Will need outpatient follow up with PCP for possible chantix or continued nicotine replacement products.    Homelessness- chronic issue. Baseline.    Poor appetite- likely 2/2 withdrawal. Tolerated clears and will advance to regular 11/3.    Hypokalemia- replace " PRN    Elevated Blood Pressure - BP mostly 140s-160s systolic throughout day and up to higher 170s overnight. Unclear if from just withdrawal or some baseline untreated HTN but has hydralazine PRN, given continuous elevation will start low dose 10 mg lisinopril for better acute control. If BP trends downwards once out of withdrawal will plan to stop.    Transaminitis-AST/ALT in 70s, stable. Hepatic steatosis, alcohol use contributors.       DVT PPx: SCDs/NIDA  Dispo: pending improvement in withdrawal symptoms. May require longer stay due to still symptoms on exam, likely next 1-2 days expected discharge

## 2019-11-04 LAB
ALBUMIN SERPL BCP-MCNC: 3.7 G/DL (ref 3.5–5.2)
ALP SERPL-CCNC: 175 U/L (ref 55–135)
ALT SERPL W/O P-5'-P-CCNC: 82 U/L (ref 10–44)
ANION GAP SERPL CALC-SCNC: 9 MMOL/L (ref 8–16)
AST SERPL-CCNC: 61 U/L (ref 10–40)
BASOPHILS # BLD AUTO: 0.07 K/UL (ref 0–0.2)
BASOPHILS NFR BLD: 0.9 % (ref 0–1.9)
BILIRUB SERPL-MCNC: 0.5 MG/DL (ref 0.1–1)
BUN SERPL-MCNC: 5 MG/DL (ref 6–20)
CALCIUM SERPL-MCNC: 9 MG/DL (ref 8.7–10.5)
CHLORIDE SERPL-SCNC: 100 MMOL/L (ref 95–110)
CO2 SERPL-SCNC: 26 MMOL/L (ref 23–29)
CREAT SERPL-MCNC: 0.9 MG/DL (ref 0.5–1.4)
DIFFERENTIAL METHOD: ABNORMAL
EOSINOPHIL # BLD AUTO: 0.9 K/UL (ref 0–0.5)
EOSINOPHIL NFR BLD: 11.1 % (ref 0–8)
ERYTHROCYTE [DISTWIDTH] IN BLOOD BY AUTOMATED COUNT: 13.6 % (ref 11.5–14.5)
EST. GFR  (AFRICAN AMERICAN): >60 ML/MIN/1.73 M^2
EST. GFR  (NON AFRICAN AMERICAN): >60 ML/MIN/1.73 M^2
GLUCOSE SERPL-MCNC: 94 MG/DL (ref 70–110)
HCT VFR BLD AUTO: 45.7 % (ref 40–54)
HGB BLD-MCNC: 14.8 G/DL (ref 14–18)
IMM GRANULOCYTES # BLD AUTO: 0.04 K/UL (ref 0–0.04)
IMM GRANULOCYTES NFR BLD AUTO: 0.5 % (ref 0–0.5)
LYMPHOCYTES # BLD AUTO: 1.5 K/UL (ref 1–4.8)
LYMPHOCYTES NFR BLD: 19 % (ref 18–48)
MCH RBC QN AUTO: 29.9 PG (ref 27–31)
MCHC RBC AUTO-ENTMCNC: 32.4 G/DL (ref 32–36)
MCV RBC AUTO: 92 FL (ref 82–98)
MONOCYTES # BLD AUTO: 0.7 K/UL (ref 0.3–1)
MONOCYTES NFR BLD: 8.1 % (ref 4–15)
NEUTROPHILS # BLD AUTO: 4.8 K/UL (ref 1.8–7.7)
NEUTROPHILS NFR BLD: 60.4 % (ref 38–73)
NRBC BLD-RTO: 0 /100 WBC
PLATELET # BLD AUTO: 206 K/UL (ref 150–350)
PMV BLD AUTO: 10.6 FL (ref 9.2–12.9)
POTASSIUM SERPL-SCNC: 4 MMOL/L (ref 3.5–5.1)
PROT SERPL-MCNC: 7.3 G/DL (ref 6–8.4)
RBC # BLD AUTO: 4.95 M/UL (ref 4.6–6.2)
SODIUM SERPL-SCNC: 135 MMOL/L (ref 136–145)
WBC # BLD AUTO: 8 K/UL (ref 3.9–12.7)

## 2019-11-04 PROCEDURE — 36415 COLL VENOUS BLD VENIPUNCTURE: CPT

## 2019-11-04 PROCEDURE — 99232 SBSQ HOSP IP/OBS MODERATE 35: CPT | Mod: ,,, | Performed by: HOSPITALIST

## 2019-11-04 PROCEDURE — 25000003 PHARM REV CODE 250: Performed by: PHYSICIAN ASSISTANT

## 2019-11-04 PROCEDURE — S4991 NICOTINE PATCH NONLEGEND: HCPCS | Performed by: PHYSICIAN ASSISTANT

## 2019-11-04 PROCEDURE — 85025 COMPLETE CBC W/AUTO DIFF WBC: CPT

## 2019-11-04 PROCEDURE — 25000003 PHARM REV CODE 250: Performed by: HOSPITALIST

## 2019-11-04 PROCEDURE — 80053 COMPREHEN METABOLIC PANEL: CPT

## 2019-11-04 PROCEDURE — 20600001 HC STEP DOWN PRIVATE ROOM

## 2019-11-04 PROCEDURE — 99232 PR SUBSEQUENT HOSPITAL CARE,LEVL II: ICD-10-PCS | Mod: ,,, | Performed by: HOSPITALIST

## 2019-11-04 RX ORDER — DIAZEPAM 5 MG/1
5 TABLET ORAL EVERY 8 HOURS
Status: DISCONTINUED | OUTPATIENT
Start: 2019-11-04 | End: 2019-11-05

## 2019-11-04 RX ORDER — DIAZEPAM 5 MG/1
5 TABLET ORAL EVERY 6 HOURS
Status: DISCONTINUED | OUTPATIENT
Start: 2019-11-04 | End: 2019-11-04

## 2019-11-04 RX ORDER — ACETAMINOPHEN 325 MG/1
650 TABLET ORAL EVERY 4 HOURS PRN
Refills: 0 | COMMUNITY
Start: 2019-11-04 | End: 2020-09-09 | Stop reason: CLARIF

## 2019-11-04 RX ADMIN — SENNOSIDES AND DOCUSATE SODIUM 1 TABLET: 8.6; 5 TABLET ORAL at 09:11

## 2019-11-04 RX ADMIN — LISINOPRIL 10 MG: 5 TABLET ORAL at 08:11

## 2019-11-04 RX ADMIN — RAMELTEON 8 MG: 8 TABLET ORAL at 09:11

## 2019-11-04 RX ADMIN — DIAZEPAM 10 MG: 5 TABLET ORAL at 05:11

## 2019-11-04 RX ADMIN — FOLIC ACID 1 MG: 1 TABLET ORAL at 08:11

## 2019-11-04 RX ADMIN — SENNOSIDES AND DOCUSATE SODIUM 1 TABLET: 8.6; 5 TABLET ORAL at 08:11

## 2019-11-04 RX ADMIN — DIAZEPAM 5 MG: 5 TABLET ORAL at 09:11

## 2019-11-04 RX ADMIN — NICOTINE 1 PATCH: 21 PATCH, EXTENDED RELEASE TRANSDERMAL at 08:11

## 2019-11-04 RX ADMIN — DIAZEPAM 5 MG: 5 TABLET ORAL at 02:11

## 2019-11-04 RX ADMIN — Medication 100 MG: at 08:11

## 2019-11-04 RX ADMIN — THERA TABS 1 TABLET: TAB at 08:11

## 2019-11-04 NOTE — PROGRESS NOTES
"Progress Note  Hospital Medicine     Team: [facility] [place] HOSP MED K Sophie Clay MD   Admit Date: 10/31/2019   Code status: Full Code   Principal Problem: Alcohol withdrawal syndrome without complication     INTERVAL HISTORY:  Feeling better overall, tolerating foods better with better appetite, no N/V. Says valium "zonks him". Discussed going to 5 mg today and will space q6 to q8 as withdrawal symptoms subjective better and vitals better. Says his BP is "normal" at baseline so will trend BP further and see if needs lisinopril tomorrow on dc vs BP recheck as outpatient. Patient says he is uncomfortable taking valium taper out of hospital and ensure tolerate PO today with lower dose as has had symptoms reoccur per him when taper lowered in past, hence why kept at 10 mg yesterday. Will plan to likely give a 5 mg dose tomorrow AM and then plan for discharge tomorrow with likely completed taper at that time. counselled extensively again on withdrawal and high risk of death or worse withdrawal next time if he continues to drink, he voiced understanding, says thsi was "the worst episode of withdrawal" yet. Plans to move out of the city long term, aware of plan to discharge tomorrow with shelter/AA resources provided at bedside to him.     ROS   Respiratory: negative for cough, shortness of breath   Cardiovascular: negative for chest discomfort, palpitations   Gastrointestinal: negative for nausea or vomiting, abdominal pain, constipation, diarrhea     PHYSICAL EXAM  Temp:  [97.1 °F (36.2 °C)-98.8 °F (37.1 °C)]   Pulse:  []   Resp:  [16-18]   BP: (119-157)/(72-94)   SpO2:  [96 %-98 %]      I & O (Last 24H):     Intake/Output Summary (Last 24 hours) at 11/4/2019 1025  Last data filed at 11/3/2019 2023  Gross per 24 hour   Intake 240 ml   Output --   Net 240 ml     General Appearance: no acute distress, lying in bed, no visible tremors  Heart: regular rhythm, HR 90s.  Respiratory: Normal respiratory effort, no crackles " "  Abdomen: Soft, non-tender; bowel sounds active   Skin: intact. IV sites ok   Neurologic: No focal numbness or weakness, left arm not in sling this AM,   Mental status: Alert, oriented x 4, affect appropriate, memory intact     LABS  BMP  Lab Results   Component Value Date     (L) 11/04/2019    K 4.0 11/04/2019     11/04/2019    CO2 26 11/04/2019    BUN 5 (L) 11/04/2019    CREATININE 0.9 11/04/2019    CALCIUM 9.0 11/04/2019    ANIONGAP 9 11/04/2019    ESTGFRAFRICA >60.0 11/04/2019    EGFRNONAA >60.0 11/04/2019     CBC  Recent Labs   Lab 11/04/19  0348   WBC 8.00   RBC 4.95   HGB 14.8   HCT 45.7      MCV 92   MCH 29.9   MCHC 32.4       Active Hospital Problems    Diagnosis  POA    *Alcohol withdrawal syndrome without complication [F10.230]  Yes    Homelessness [Z59.0]  Not Applicable    Poor appetite [R63.0]  Yes    Separation of left acromioclavicular joint [S43.102A]  Yes    Transaminitis [R74.0]  Yes     Chronic    Tobacco use disorder [F17.200]  Yes     Chronic    Alcohol use disorder, severe, dependence [F10.20]  Yes     Chronic      Resolved Hospital Problems   No resolved problems to display.        Overview/Hospital Course:  44 yo male w/ hx of alcohol abuse, chronic right shoulder pain, and transaminitis, coming in with new left shoulder pain and tremors, found to be in Alcohol withdrawal, reportedly drinking a "handle" of vodka per day. He has been admitted multiple times for alcohol withdrawal. He denied any previous seizure activity. Ortho was called on arrival to hospital he will follow up as outpt. He does state that he would like to quit drinking. Addiction Psych was called.    Assessment and Plan for problems addressed today:   * Alcohol withdrawal syndrome without complication  Alcohol use disorder, severe, dependence  Transaminitis  - CT head was negative for acute abnormalities  - ETOH withdrawal order set in place with [facility]   - valium tapered dto 5 mg today and spaced to " q8 as withdrawal symptoms nearly resolved now. likely to finish taper tomorrow as prolonged 10 mg dose due to significant symptoms so delayed taper to 5 mg until today. Tolerating diet further the last day.  -Hepatitis panel negative at that time, US showed hepatic steatosis.  - LFTs improved from prior labs.  - Utox also positive for benzos (received in ER) and amphetamine.  - discussed and counseled again on alcohol cessation at length. Resources at bedside. He plans to move to another city to get away from the temptations of the [place] and alcohol  - consulted addiction psych, appreciate recs     Separation of left acromioclavicular joint  - X-ray showed AC joint separation of left shoulder.  - Ortho consulted, appreciate assistance, patient in arm sling/nonweight bearing  -arm not in sling on 11/4, needs to keep it there when he isnt sitting in bed to avoid worsening injury.  - Patient wishes to follow-up at [facility] for possible surgery as an outpatient      Tobacco use disorder   - nicotine patch prescribed  - Pt is aware and in agreement with the need to quit tobacco products   - Will need outpatient follow up with PCP for possible chantix or continued nicotine replacement products.    Homelessness- chronic issue. Baseline.    Poor appetite- likely 2/2 withdrawal. Tolerated advancement without N/V.    Hypokalemia- replace PRN    Elevated Blood Pressure - BP mostly 140s-160s systolic throughout day and up to higher 170s overnight. Unclear if from just withdrawal or some baseline untreated HTN but has hydralazine PRN, given continuous elevation will start low dose 10 mg lisinopril for better acute control. If BP trends downwards once out of withdrawal will plan to stop.  11/4: on lisinopril 10 with bP at goal now, unsure if true HTN but will trend, if goes low today now that withdrawal symptoms improved will plan to stop at discharge. May have undiagnosed HTN, difficult to say in acute setting, will trend  today further.    Transaminitis-AST/ALT in 70s, stable. Hepatic steatosis, alcohol use contributors.       DVT PPx: SCDs/NIDA  Dispo: complete valium taper today, tolerance PO, BP trending. Plan for discharge tomorrow. PCP f/u. He prefers [facility] ortho f/u for ortho injury.

## 2019-11-04 NOTE — PLAN OF CARE
11/04/19 0939   Post-Acute Status   Post-Acute Authorization Placement   Post-Acute Placement Status Awaiting Internal Medical Clearance

## 2019-11-04 NOTE — PLAN OF CARE
POC reviewed at bedside with patient. Questions and concerns addressed. BP more controlled this shift. VSS. Safety maintained. Bed in low and locked position. Call light within reach. Side rails up x2. Frequent rounds.

## 2019-11-05 VITALS
BODY MASS INDEX: 24.81 KG/M2 | WEIGHT: 177.25 LBS | DIASTOLIC BLOOD PRESSURE: 76 MMHG | RESPIRATION RATE: 17 BRPM | HEIGHT: 71 IN | TEMPERATURE: 98 F | HEART RATE: 86 BPM | SYSTOLIC BLOOD PRESSURE: 120 MMHG | OXYGEN SATURATION: 96 %

## 2019-11-05 PROBLEM — R63.0 POOR APPETITE: Status: RESOLVED | Noted: 2019-11-02 | Resolved: 2019-11-05

## 2019-11-05 PROBLEM — G89.29 CHRONIC RIGHT SHOULDER PAIN: Status: RESOLVED | Noted: 2019-10-02 | Resolved: 2019-11-05

## 2019-11-05 PROBLEM — S36.119A LIVER INJURY: Status: RESOLVED | Noted: 2019-10-16 | Resolved: 2019-11-05

## 2019-11-05 PROBLEM — M25.511 CHRONIC RIGHT SHOULDER PAIN: Status: RESOLVED | Noted: 2019-10-02 | Resolved: 2019-11-05

## 2019-11-05 LAB
ALBUMIN SERPL BCP-MCNC: 3.6 G/DL (ref 3.5–5.2)
ALP SERPL-CCNC: 159 U/L (ref 55–135)
ALT SERPL W/O P-5'-P-CCNC: 106 U/L (ref 10–44)
ANION GAP SERPL CALC-SCNC: 9 MMOL/L (ref 8–16)
AST SERPL-CCNC: 86 U/L (ref 10–40)
BASOPHILS # BLD AUTO: 0.06 K/UL (ref 0–0.2)
BASOPHILS NFR BLD: 1 % (ref 0–1.9)
BILIRUB SERPL-MCNC: 0.4 MG/DL (ref 0.1–1)
BUN SERPL-MCNC: 6 MG/DL (ref 6–20)
CALCIUM SERPL-MCNC: 9.2 MG/DL (ref 8.7–10.5)
CHLORIDE SERPL-SCNC: 105 MMOL/L (ref 95–110)
CO2 SERPL-SCNC: 24 MMOL/L (ref 23–29)
CREAT SERPL-MCNC: 0.8 MG/DL (ref 0.5–1.4)
DIFFERENTIAL METHOD: ABNORMAL
EOSINOPHIL # BLD AUTO: 0.6 K/UL (ref 0–0.5)
EOSINOPHIL NFR BLD: 10.8 % (ref 0–8)
ERYTHROCYTE [DISTWIDTH] IN BLOOD BY AUTOMATED COUNT: 14 % (ref 11.5–14.5)
EST. GFR  (AFRICAN AMERICAN): >60 ML/MIN/1.73 M^2
EST. GFR  (NON AFRICAN AMERICAN): >60 ML/MIN/1.73 M^2
GLUCOSE SERPL-MCNC: 97 MG/DL (ref 70–110)
HCT VFR BLD AUTO: 45.3 % (ref 40–54)
HGB BLD-MCNC: 14.8 G/DL (ref 14–18)
IMM GRANULOCYTES # BLD AUTO: 0.05 K/UL (ref 0–0.04)
IMM GRANULOCYTES NFR BLD AUTO: 0.8 % (ref 0–0.5)
LYMPHOCYTES # BLD AUTO: 2.1 K/UL (ref 1–4.8)
LYMPHOCYTES NFR BLD: 35.9 % (ref 18–48)
MCH RBC QN AUTO: 30.3 PG (ref 27–31)
MCHC RBC AUTO-ENTMCNC: 32.7 G/DL (ref 32–36)
MCV RBC AUTO: 93 FL (ref 82–98)
MONOCYTES # BLD AUTO: 0.6 K/UL (ref 0.3–1)
MONOCYTES NFR BLD: 9.7 % (ref 4–15)
NEUTROPHILS # BLD AUTO: 2.5 K/UL (ref 1.8–7.7)
NEUTROPHILS NFR BLD: 41.8 % (ref 38–73)
NRBC BLD-RTO: 0 /100 WBC
PLATELET # BLD AUTO: 193 K/UL (ref 150–350)
PMV BLD AUTO: 11 FL (ref 9.2–12.9)
POTASSIUM SERPL-SCNC: 3.7 MMOL/L (ref 3.5–5.1)
PROT SERPL-MCNC: 7.1 G/DL (ref 6–8.4)
RBC # BLD AUTO: 4.88 M/UL (ref 4.6–6.2)
SODIUM SERPL-SCNC: 138 MMOL/L (ref 136–145)
WBC # BLD AUTO: 5.9 K/UL (ref 3.9–12.7)

## 2019-11-05 PROCEDURE — 99238 HOSP IP/OBS DSCHRG MGMT 30/<: CPT | Mod: ,,, | Performed by: INTERNAL MEDICINE

## 2019-11-05 PROCEDURE — 36415 COLL VENOUS BLD VENIPUNCTURE: CPT

## 2019-11-05 PROCEDURE — 25000003 PHARM REV CODE 250: Performed by: HOSPITALIST

## 2019-11-05 PROCEDURE — 85025 COMPLETE CBC W/AUTO DIFF WBC: CPT

## 2019-11-05 PROCEDURE — 99232 PR SUBSEQUENT HOSPITAL CARE,LEVL II: ICD-10-PCS | Mod: AF,HB,, | Performed by: PSYCHIATRY & NEUROLOGY

## 2019-11-05 PROCEDURE — 99238 PR HOSPITAL DISCHARGE DAY,<30 MIN: ICD-10-PCS | Mod: ,,, | Performed by: INTERNAL MEDICINE

## 2019-11-05 PROCEDURE — 99232 SBSQ HOSP IP/OBS MODERATE 35: CPT | Mod: AF,HB,, | Performed by: PSYCHIATRY & NEUROLOGY

## 2019-11-05 PROCEDURE — 80053 COMPREHEN METABOLIC PANEL: CPT

## 2019-11-05 PROCEDURE — 25000003 PHARM REV CODE 250: Performed by: PHYSICIAN ASSISTANT

## 2019-11-05 RX ADMIN — FOLIC ACID 1 MG: 1 TABLET ORAL at 10:11

## 2019-11-05 RX ADMIN — THERA TABS 1 TABLET: TAB at 10:11

## 2019-11-05 RX ADMIN — Medication 100 MG: at 10:11

## 2019-11-05 NOTE — PLAN OF CARE
Evaluated by Neurology  Likely due to Neurontin side effect-  Interaction with narcotic side effect  Unlikely to be seizure  Gabapentin discontinued  Continue to monitor POC reviewed with patient.  Discharge instructions reviewed with patient who gave verbal understanding.  Offer for flu vaccine declined.  Discharge transportation arranged by case management.  Peripheral IV discontinued, cath tip intact, dressing applied, pressure held, no active bleeding noted.  AVS reviewed and given to patient. NAD, pt awaiting transport.

## 2019-11-05 NOTE — PLAN OF CARE
Pt discharged to [facility]. CM setup an ambulatory transport vis [facility] - requested pickup time is 2pm. Requested pickup time is not a guarantee of actual pickup time. Pt's nurse is aware of d/c plans.     11/05/19 1249   Final Note   Assessment Type Final Discharge Note   Anticipated Discharge Disposition Home   Right Care Referral Info   Post Acute Recommendation No Care

## 2019-11-05 NOTE — HOSPITAL COURSE
11/5/2019: Patient seen at bedside today. Reports that he refused AM dose of Valium as he did not want to be overly sedated should he be discharged today. He has been unable to secure placement at rehab facility secondary to lack of ID. He has plans to be discharged to [facility], who he states will accept him without identification and will help him acquire one. Patient has been actively calling rehab facilities on resource sheet given to him. He has discovered a few possible options once he is able to get his ID, but understands that he may be homeless for a period of time before being accepted. Patient is agreeable to discharge today and/or tomorrow pending decision by primary team. States that he'd be more willing to take and complete his Valium taper if he knew he were discharged tomorrow. Otherwise, endorses slight headache, although tolerable. Admits to some anxiety pertaining to discharge plans, but otherwise, appears to have a logical plan in place.
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
## 2019-11-05 NOTE — PLAN OF CARE
No acute changes overnight. VSS. Hourly rounding performed. Pt remained free of falls. Safety maintained. Telemetry in use. No complaints of pain. Pt resting. No needs at this time. Call bell in reach. Will continue to monitor.
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
## 2019-11-06 NOTE — ASSESSMENT & PLAN NOTE
- X-ray showed AC joint separation of left shoulder.  - Appreciate orthopaedic's assistance.  Patient was placed in sling and is nonweightbearing to LUE.  - Patient wishes to follow-up at [facility] for possible surgery.

## 2019-11-06 NOTE — ASSESSMENT & PLAN NOTE
Alcohol use disorder, severe, dependence  Transaminitis  - Resolved and completed Valium taper in hospital and not discharged on any Valium as high risk to return to alcohol.   - CT head with no acute abnormalities.  - Patient placed on Alcohol withdrawal order set with scheduled valium, Thiamine, Folate and MVI.   - Patient admitted on 10/14/2019 with similar issues.  Hepatitis panel negative at that time, US showed hepatic steatosis. Transaminitis noted on this admit and likely related to fatty liver disease from ETOH with continued ETOH use. LFTs improved from prior labs.  - Complete cessation recommended. Addiction Psych counseled patient and patient given list of local resources to assist in alcohol cessation.

## 2019-11-06 NOTE — ASSESSMENT & PLAN NOTE
- Tobacco cessation discussed with patient for greater than 3 minutes.   - Offered tobacco patch while hospitalized.  - Patient is aware and in agreement with the need to quit tobacco products.

## 2019-11-11 ENCOUNTER — HOSPITAL ENCOUNTER (EMERGENCY)
Facility: HOSPITAL | Age: 44
Discharge: LEFT BEFORE DISCHARGE | End: 2019-11-11
Attending: EMERGENCY MEDICINE
Payer: MEDICAID

## 2019-11-11 VITALS
DIASTOLIC BLOOD PRESSURE: 65 MMHG | OXYGEN SATURATION: 97 % | HEART RATE: 91 BPM | SYSTOLIC BLOOD PRESSURE: 103 MMHG | RESPIRATION RATE: 14 BRPM | WEIGHT: 177 LBS | BODY MASS INDEX: 24.78 KG/M2 | HEIGHT: 71 IN | TEMPERATURE: 98 F

## 2019-11-11 DIAGNOSIS — F10.920 ALCOHOLIC INTOXICATION WITHOUT COMPLICATION: Primary | ICD-10-CM

## 2019-11-11 PROCEDURE — 99284 EMERGENCY DEPT VISIT MOD MDM: CPT | Mod: ,,, | Performed by: EMERGENCY MEDICINE

## 2019-11-11 PROCEDURE — 99284 PR EMERGENCY DEPT VISIT,LEVEL IV: ICD-10-PCS | Mod: ,,, | Performed by: EMERGENCY MEDICINE

## 2019-11-11 PROCEDURE — 99283 EMERGENCY DEPT VISIT LOW MDM: CPT

## 2019-11-11 PROCEDURE — 25000003 PHARM REV CODE 250: Performed by: EMERGENCY MEDICINE

## 2019-11-11 RX ORDER — ONDANSETRON 4 MG/1
4 TABLET, ORALLY DISINTEGRATING ORAL
Status: COMPLETED | OUTPATIENT
Start: 2019-11-11 | End: 2019-11-11

## 2019-11-11 RX ADMIN — ONDANSETRON 4 MG: 4 TABLET, ORALLY DISINTEGRATING ORAL at 06:11

## 2019-11-11 NOTE — ED NOTES
Hourly rounding complete. Patient resting in stretcher and is in NAD at this time. Pt is sleeping, remains arousable with repeated verbal and tactile stimuli. Pt updated on POC. Bed low and locked, SR up x2, room near nurses unit.

## 2019-11-11 NOTE — ED TRIAGE NOTES
Gilmar Clemente, a 43 y.o. male presents to the ED via EMS with CC alcohol intoxication. Patient drowsy on arrival to ED, responsive to repeated verbal and tactile stimuli, not answering questions on arrival. + ETOH odor.    Patient identifiers verified verbally with patient and EMS and correct for Gilmar Clemente.    SKIN: Skin is warm dry and intact, and color is consistent with ethnicity. Capillary refill <3 seconds. No breakdown or brusing visible. Mucus membranes moist, acyanotic.  RESPIRATORY: Airway is open and patent. Respirations-spontaneous, unlabored, regular rate, equal bilaterally on inspiration and expiration. No accessory muscle use noted. Lungs clear to auscultation in all fields bilaterally anterior and posterior.   CARDIAC: Patient has regular heart rate.  No peripheral edema noted, and patient has no c/o chest pain. Peripheral pulses present equal and strong throughout.  ABDOMEN: Soft and non-tender to palpation with no distention noted.  NEUROLOGIC: Eyes open spontaneously and facial expression symmetrical. Pt behavior appropriate to situation, and pt follows commands. Pt reports sensation present in all extremities when touched with a finger, denies any numbness or tingling. PERRLA  MUSCULOSKELETAL: Spontaneous movement noted to all extremities. Hand  equal and leg strength strong +5 bilaterally.
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
## 2019-11-11 NOTE — ED NOTES
Hourly rounding complete. Patient resting in stretcher and is in NAD at this time. Pt is sleeping, now arousable with light verbal stimuli. Pt updated on POC. Bed low and locked, SR up x2, room near nurses unit.

## 2019-11-11 NOTE — ED NOTES
Hourly rounding complete. Patient resting in stretcher and is in NAD at this time. Pt is sleeping, remains arousable with light verbal stimuli. Pt updated on POC. Bed low and locked, SR up x2, room near nurses unit.

## 2019-11-12 NOTE — ED NOTES
Pt is awake, alert and oriented x 3. Pt ambulating with normal gait. Pt refusing to stay. Dr. Diaz notified.

## 2019-11-12 NOTE — ED NOTES
"Hourly rounding complete. Patient resting in stretcher and is in NAD at this time. Pt is sleeping, remains arousable with light verbal stimuli. Patient now c/o nausea and "withdrawals", Dr Diaz informed, verbal order received for zofran SL 4 mg. Pt updated on POC. Bed low and locked, SR up x2, room near nurses unit.       "
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
## 2020-09-09 ENCOUNTER — HOSPITAL ENCOUNTER (INPATIENT)
Facility: HOSPITAL | Age: 45
LOS: 5 days | Discharge: NURSING, REHAB OR LONG-TERM CARE | DRG: 896 | End: 2020-09-14
Attending: EMERGENCY MEDICINE | Admitting: EMERGENCY MEDICINE
Payer: MEDICAID

## 2020-09-09 DIAGNOSIS — S12.8XXA: ICD-10-CM

## 2020-09-09 DIAGNOSIS — F10.10 ETOH ABUSE: ICD-10-CM

## 2020-09-09 DIAGNOSIS — R07.9 CHEST PAIN: ICD-10-CM

## 2020-09-09 DIAGNOSIS — R07.81 RIB PAIN ON LEFT SIDE: ICD-10-CM

## 2020-09-09 DIAGNOSIS — S22.32XA CLOSED FRACTURE OF ONE RIB OF LEFT SIDE, INITIAL ENCOUNTER: ICD-10-CM
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
## 2020-09-09 DIAGNOSIS — S02.2XXA CLOSED FRACTURE OF NASAL BONE, INITIAL ENCOUNTER: ICD-10-CM
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
## 2020-09-09 DIAGNOSIS — F10.20 ALCOHOL USE DISORDER, SEVERE, DEPENDENCE: ICD-10-CM

## 2020-09-09 PROBLEM — S02.92XA MULTIPLE CLOSED FRACTURES OF FACIAL BONE: Status: ACTIVE | Noted: 2020-09-09

## 2020-09-09 LAB
ALBUMIN SERPL BCP-MCNC: 4.7 G/DL (ref 3.5–5.2)
ALP SERPL-CCNC: 147 U/L (ref 55–135)
ALT SERPL W/O P-5'-P-CCNC: 60 U/L (ref 10–44)
ANION GAP SERPL CALC-SCNC: 16 MMOL/L (ref 8–16)
AST SERPL-CCNC: 62 U/L (ref 10–40)
BASOPHILS # BLD AUTO: 0.11 K/UL (ref 0–0.2)
BASOPHILS NFR BLD: 1.1 % (ref 0–1.9)
BILIRUB SERPL-MCNC: 0.8 MG/DL (ref 0.1–1)
BUN SERPL-MCNC: 16 MG/DL (ref 6–20)
CALCIUM SERPL-MCNC: 9.4 MG/DL (ref 8.7–10.5)
CHLORIDE SERPL-SCNC: 105 MMOL/L (ref 95–110)
CO2 SERPL-SCNC: 18 MMOL/L (ref 23–29)
CREAT SERPL-MCNC: 0.9 MG/DL (ref 0.5–1.4)
DIFFERENTIAL METHOD: NORMAL
EOSINOPHIL # BLD AUTO: 0.1 K/UL (ref 0–0.5)
EOSINOPHIL NFR BLD: 1 % (ref 0–8)
ERYTHROCYTE [DISTWIDTH] IN BLOOD BY AUTOMATED COUNT: 12.6 % (ref 11.5–14.5)
EST. GFR  (AFRICAN AMERICAN): >60 ML/MIN/1.73 M^2
EST. GFR  (NON AFRICAN AMERICAN): >60 ML/MIN/1.73 M^2
GLUCOSE SERPL-MCNC: 89 MG/DL (ref 70–110)
HCT VFR BLD AUTO: 45 % (ref 40–54)
HGB BLD-MCNC: 14.8 G/DL (ref 14–18)
IMM GRANULOCYTES # BLD AUTO: 0.02 K/UL (ref 0–0.04)
IMM GRANULOCYTES NFR BLD AUTO: 0.2 % (ref 0–0.5)
LIPASE SERPL-CCNC: 17 U/L (ref 4–60)
LYMPHOCYTES # BLD AUTO: 2.3 K/UL (ref 1–4.8)
LYMPHOCYTES NFR BLD: 23.1 % (ref 18–48)
MAGNESIUM SERPL-MCNC: 2.1 MG/DL (ref 1.6–2.6)
MCH RBC QN AUTO: 29.4 PG (ref 27–31)
MCHC RBC AUTO-ENTMCNC: 32.9 G/DL (ref 32–36)
MCV RBC AUTO: 89 FL (ref 82–98)
MONOCYTES # BLD AUTO: 0.9 K/UL (ref 0.3–1)
MONOCYTES NFR BLD: 9.2 % (ref 4–15)
NEUTROPHILS # BLD AUTO: 6.5 K/UL (ref 1.8–7.7)
NEUTROPHILS NFR BLD: 65.4 % (ref 38–73)
NRBC BLD-RTO: 0 /100 WBC
PLATELET # BLD AUTO: 225 K/UL (ref 150–350)
PMV BLD AUTO: 10.5 FL (ref 9.2–12.9)
POTASSIUM SERPL-SCNC: 4.2 MMOL/L (ref 3.5–5.1)
PROT SERPL-MCNC: 8.3 G/DL (ref 6–8.4)
RBC # BLD AUTO: 5.04 M/UL (ref 4.6–6.2)
SARS-COV-2 RDRP RESP QL NAA+PROBE: NEGATIVE
SODIUM SERPL-SCNC: 139 MMOL/L (ref 136–145)
TSH SERPL DL<=0.005 MIU/L-ACNC: 0.45 UIU/ML (ref 0.4–4)
WBC # BLD AUTO: 9.88 K/UL (ref 3.9–12.7)

## 2020-09-09 PROCEDURE — 25000003 PHARM REV CODE 250: Performed by: PHYSICIAN ASSISTANT

## 2020-09-09 PROCEDURE — 96361 HYDRATE IV INFUSION ADD-ON: CPT

## 2020-09-09 PROCEDURE — 96375 TX/PRO/DX INJ NEW DRUG ADDON: CPT | Performed by: EMERGENCY MEDICINE

## 2020-09-09 PROCEDURE — 93010 EKG 12-LEAD: ICD-10-PCS | Mod: ,,, | Performed by: INTERNAL MEDICINE

## 2020-09-09 PROCEDURE — 99202 OFFICE O/P NEW SF 15 MIN: CPT | Mod: 25,,, | Performed by: OTOLARYNGOLOGY

## 2020-09-09 PROCEDURE — 85025 COMPLETE CBC W/AUTO DIFF WBC: CPT

## 2020-09-09 PROCEDURE — G0378 HOSPITAL OBSERVATION PER HR: HCPCS

## 2020-09-09 PROCEDURE — 83735 ASSAY OF MAGNESIUM: CPT

## 2020-09-09 PROCEDURE — 93010 ELECTROCARDIOGRAM REPORT: CPT | Mod: ,,, | Performed by: INTERNAL MEDICINE

## 2020-09-09 PROCEDURE — 96376 TX/PRO/DX INJ SAME DRUG ADON: CPT | Performed by: EMERGENCY MEDICINE

## 2020-09-09 PROCEDURE — 80053 COMPREHEN METABOLIC PANEL: CPT

## 2020-09-09 PROCEDURE — 96374 THER/PROPH/DIAG INJ IV PUSH: CPT

## 2020-09-09 PROCEDURE — 63600175 PHARM REV CODE 636 W HCPCS: Performed by: PHYSICIAN ASSISTANT

## 2020-09-09 PROCEDURE — U0002 COVID-19 LAB TEST NON-CDC: HCPCS

## 2020-09-09 PROCEDURE — 93005 ELECTROCARDIOGRAM TRACING: CPT

## 2020-09-09 PROCEDURE — 31575 PR LARYNGOSCOPY, FLEXIBLE; DIAGNOSTIC: ICD-10-PCS | Mod: ,,, | Performed by: OTOLARYNGOLOGY

## 2020-09-09 PROCEDURE — 83690 ASSAY OF LIPASE: CPT

## 2020-09-09 PROCEDURE — 99285 EMERGENCY DEPT VISIT HI MDM: CPT | Mod: ,,, | Performed by: PHYSICIAN ASSISTANT

## 2020-09-09 PROCEDURE — 99285 EMERGENCY DEPT VISIT HI MDM: CPT | Mod: 25

## 2020-09-09 PROCEDURE — 99202 PR OFFICE/OUTPT VISIT, NEW, LEVL II, 15-29 MIN: ICD-10-PCS | Mod: 25,,, | Performed by: OTOLARYNGOLOGY

## 2020-09-09 PROCEDURE — 99220 PR INITIAL OBSERVATION CARE,LEVL III: CPT | Mod: ,,, | Performed by: PHYSICIAN ASSISTANT

## 2020-09-09 PROCEDURE — 99220 PR INITIAL OBSERVATION CARE,LEVL III: ICD-10-PCS | Mod: ,,, | Performed by: PHYSICIAN ASSISTANT

## 2020-09-09 PROCEDURE — 99285 PR EMERGENCY DEPT VISIT,LEVEL V: ICD-10-PCS | Mod: ,,, | Performed by: PHYSICIAN ASSISTANT

## 2020-09-09 PROCEDURE — 84443 ASSAY THYROID STIM HORMONE: CPT

## 2020-09-09 PROCEDURE — 11000001 HC ACUTE MED/SURG PRIVATE ROOM

## 2020-09-09 PROCEDURE — 31575 DIAGNOSTIC LARYNGOSCOPY: CPT | Mod: ,,, | Performed by: OTOLARYNGOLOGY

## 2020-09-09 RX ORDER — IBUPROFEN 200 MG
16 TABLET ORAL
Status: DISCONTINUED | OUTPATIENT
Start: 2020-09-09 | End: 2020-09-14 | Stop reason: HOSPADM

## 2020-09-09 RX ORDER — IPRATROPIUM BROMIDE AND ALBUTEROL SULFATE 2.5; .5 MG/3ML; MG/3ML
3 SOLUTION RESPIRATORY (INHALATION) EVERY 4 HOURS PRN
Status: DISCONTINUED | OUTPATIENT
Start: 2020-09-09 | End: 2020-09-14 | Stop reason: HOSPADM

## 2020-09-09 RX ORDER — ONDANSETRON 2 MG/ML
4 INJECTION INTRAMUSCULAR; INTRAVENOUS EVERY 8 HOURS PRN
Status: DISCONTINUED | OUTPATIENT
Start: 2020-09-09 | End: 2020-09-14 | Stop reason: HOSPADM

## 2020-09-09 RX ORDER — ACETAMINOPHEN 325 MG/1
650 TABLET ORAL EVERY 8 HOURS PRN
Status: DISCONTINUED | OUTPATIENT
Start: 2020-09-09 | End: 2020-09-14 | Stop reason: HOSPADM

## 2020-09-09 RX ORDER — ONDANSETRON 8 MG/1
8 TABLET, ORALLY DISINTEGRATING ORAL EVERY 8 HOURS PRN
Status: DISCONTINUED | OUTPATIENT
Start: 2020-09-09 | End: 2020-09-14 | Stop reason: HOSPADM

## 2020-09-09 RX ORDER — DEXAMETHASONE SODIUM PHOSPHATE 4 MG/ML
8 INJECTION, SOLUTION INTRA-ARTICULAR; INTRALESIONAL; INTRAMUSCULAR; INTRAVENOUS; SOFT TISSUE
Status: COMPLETED | OUTPATIENT
Start: 2020-09-09 | End: 2020-09-09

## 2020-09-09 RX ORDER — SODIUM CHLORIDE 0.9 % (FLUSH) 0.9 %
10 SYRINGE (ML) INJECTION
Status: DISCONTINUED | OUTPATIENT
Start: 2020-09-09 | End: 2020-09-14 | Stop reason: HOSPADM

## 2020-09-09 RX ORDER — DEXAMETHASONE SODIUM PHOSPHATE 4 MG/ML
8 INJECTION, SOLUTION INTRA-ARTICULAR; INTRALESIONAL; INTRAMUSCULAR; INTRAVENOUS; SOFT TISSUE EVERY 8 HOURS
Status: DISCONTINUED | OUTPATIENT
Start: 2020-09-09 | End: 2020-09-10

## 2020-09-09 RX ORDER — PROPARACAINE HYDROCHLORIDE 5 MG/ML
1 SOLUTION/ DROPS OPHTHALMIC
Status: COMPLETED | OUTPATIENT
Start: 2020-09-09 | End: 2020-09-09

## 2020-09-09 RX ORDER — LORAZEPAM 2 MG/ML
2 INJECTION INTRAMUSCULAR
Status: COMPLETED | OUTPATIENT
Start: 2020-09-09 | End: 2020-09-09

## 2020-09-09 RX ORDER — IBUPROFEN 200 MG
24 TABLET ORAL
Status: DISCONTINUED | OUTPATIENT
Start: 2020-09-09 | End: 2020-09-14 | Stop reason: HOSPADM

## 2020-09-09 RX ORDER — DIAZEPAM 5 MG/1
5 TABLET ORAL EVERY 8 HOURS
Status: DISCONTINUED | OUTPATIENT
Start: 2020-09-09 | End: 2020-09-10

## 2020-09-09 RX ORDER — FOLIC ACID 1 MG/1
1 TABLET ORAL DAILY
Status: DISCONTINUED | OUTPATIENT
Start: 2020-09-09 | End: 2020-09-14 | Stop reason: HOSPADM

## 2020-09-09 RX ORDER — LORAZEPAM 1 MG/1
2 TABLET ORAL EVERY 4 HOURS PRN
Status: DISCONTINUED | OUTPATIENT
Start: 2020-09-09 | End: 2020-09-14 | Stop reason: HOSPADM

## 2020-09-09 RX ORDER — GLUCAGON 1 MG
1 KIT INJECTION
Status: DISCONTINUED | OUTPATIENT
Start: 2020-09-09 | End: 2020-09-14 | Stop reason: HOSPADM

## 2020-09-09 RX ORDER — TALC
6 POWDER (GRAM) TOPICAL NIGHTLY PRN
Status: DISCONTINUED | OUTPATIENT
Start: 2020-09-09 | End: 2020-09-14 | Stop reason: HOSPADM

## 2020-09-09 RX ADMIN — LORAZEPAM 2 MG: 2 INJECTION INTRAMUSCULAR; INTRAVENOUS at 09:09

## 2020-09-09 RX ADMIN — DEXAMETHASONE SODIUM PHOSPHATE 8 MG: 4 INJECTION, SOLUTION INTRAMUSCULAR; INTRAVENOUS at 02:09

## 2020-09-09 RX ADMIN — DIAZEPAM 5 MG: 5 TABLET ORAL at 11:09

## 2020-09-09 RX ADMIN — THERA TABS 1 TABLET: TAB at 03:09

## 2020-09-09 RX ADMIN — DEXAMETHASONE SODIUM PHOSPHATE 8 MG: 4 INJECTION, SOLUTION INTRA-ARTICULAR; INTRALESIONAL; INTRAMUSCULAR; INTRAVENOUS; SOFT TISSUE at 11:09

## 2020-09-09 RX ADMIN — SODIUM CHLORIDE 1000 ML: 0.9 INJECTION, SOLUTION INTRAVENOUS at 09:09

## 2020-09-09 RX ADMIN — FOLIC ACID 1 MG: 1 TABLET ORAL at 03:09

## 2020-09-09 RX ADMIN — PROPARACAINE HYDROCHLORIDE 1 DROP: 5 SOLUTION/ DROPS OPHTHALMIC at 09:09

## 2020-09-09 RX ADMIN — LORAZEPAM 2 MG: 1 TABLET ORAL at 07:09
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
## 2020-09-09 NOTE — SUBJECTIVE & OBJECTIVE
"Past Medical History:   Diagnosis Date    Alcohol abuse     Depression     History of psychiatric hospitalization     Hx of psychiatric care     Psychiatric exam requested by authority     Psychiatric problem     Suicide attempt     Therapy     Withdrawal symptoms, alcohol        History reviewed. No pertinent surgical history.    Review of patient's allergies indicates:  No Known Allergies    No current facility-administered medications on file prior to encounter.      Current Outpatient Medications on File Prior to Encounter   Medication Sig    [DISCONTINUED] acetaminophen (TYLENOL) 325 MG tablet Take 2 tablets (650 mg total) by mouth every 4 (four) hours as needed.    [DISCONTINUED] methyl salicylate-menthol 15-10% 15-10 % Crea Apply topically 3 (three) times daily.    [DISCONTINUED] naproxen (NAPROSYN) 500 MG tablet Take 1 tablet (500 mg total) by mouth 2 (two) times daily with meals.     Family History     Problem Relation (Age of Onset)    Alcohol abuse Paternal Grandmother    No Known Problems Mother, Father        Tobacco Use    Smoking status: Current Every Day Smoker     Packs/day: 1.00     Years: 30.00     Pack years: 30.00     Types: Cigarettes    Smokeless tobacco: Never Used   Substance and Sexual Activity    Alcohol use: Yes     Comment: drinkl " alot everyday- wine beer whatever I get my hands on"    Drug use: Yes     Types: Cocaine, Marijuana     Comment: "In the past, but not recently"    Sexual activity: Yes     Partners: Female     Review of Systems   Constitutional: Negative for activity change, chills and fever.   HENT: Positive for facial swelling. Negative for trouble swallowing and voice change.         Mild pain w/ swallowing   Eyes: Positive for pain and redness. Negative for photophobia and visual disturbance.   Respiratory: Negative for chest tightness, shortness of breath and wheezing.    Cardiovascular: Negative for chest pain, palpitations and leg swelling. "   Gastrointestinal: Negative for abdominal pain, constipation, diarrhea, nausea and vomiting.   Genitourinary: Negative for dysuria, frequency, hematuria and urgency.   Musculoskeletal: Positive for gait problem, neck pain and neck stiffness. Negative for arthralgias and back pain.        L-sided rib pain/soreness   Skin: Positive for wound. Negative for color change and rash.   Neurological: Positive for tremors and syncope. Negative for dizziness, seizures, weakness, light-headedness, numbness and headaches.   Psychiatric/Behavioral: Negative for agitation and confusion. The patient is not nervous/anxious.      Objective:     Vital Signs (Most Recent):  Temp: 98.2 °F (36.8 °C) (09/09/20 0832)  Pulse: 104 (09/09/20 1505)  Resp: 16 (09/09/20 1505)  BP: 129/74 (09/09/20 1505)  SpO2: 98 % (09/09/20 1505) Vital Signs (24h Range):  Temp:  [98.2 °F (36.8 °C)] 98.2 °F (36.8 °C)  Pulse:  [] 104  Resp:  [16-20] 16  SpO2:  [98 %] 98 %  BP: (128-134)/(72-87) 129/74     Weight: 81.6 kg (180 lb)  Body mass index is 25.1 kg/m².    Physical Exam  Vitals signs and nursing note reviewed.   Constitutional:       General: He is not in acute distress.     Appearance: He is well-developed. He is not toxic-appearing.   HENT:      Head: Normocephalic and atraumatic.      Mouth/Throat:      Mouth: Mucous membranes are moist.      Pharynx: Oropharynx is clear.   Eyes:      General: No scleral icterus.     Extraocular Movements: Extraocular movements intact.      Comments: Subconjunctival hemorrhage inferior-lateral aspect of L eye. L periorbital ecchymosis. TTP inferior-lateral aspect of L eye. No pain with EOM.  VA: R20/25, L20/20, BL 20/20  IOP L eye 6, 8, 14, 14    Neck:      Musculoskeletal: Neck supple.      Comments: Limited ROM 2/2 pain  Cardiovascular:      Rate and Rhythm: Normal rate and regular rhythm.      Heart sounds: Normal heart sounds.   Pulmonary:      Effort: Pulmonary effort is normal. No respiratory distress.       Breath sounds: Decreased breath sounds (pt unable to take deep inspiration 2/2 pain) present. No wheezing.      Comments: TTP L anterior-lateral ribs, no ecchymoses or deformity noted.  Abdominal:      General: Bowel sounds are normal. There is no distension.      Palpations: Abdomen is soft.      Tenderness: There is no abdominal tenderness.   Musculoskeletal: Normal range of motion.         General: No tenderness.   Lymphadenopathy:      Cervical: No cervical adenopathy.   Skin:     General: Skin is warm and dry.      Capillary Refill: Capillary refill takes less than 2 seconds.      Findings: No rash.   Neurological:      Mental Status: He is alert and oriented to person, place, and time.      Cranial Nerves: No cranial nerve deficit.      Sensory: No sensory deficit.      Coordination: Coordination normal.      Comments: Mild tremors in upper extremities. Follows commands appropriately. No dysmetria, dysdiadochokinesia, peripheral vision deficits. Negative pronator drift.    Psychiatric:         Behavior: Behavior normal.         Thought Content: Thought content normal.         Judgment: Judgment normal.             Significant Labs:   CBC:   Recent Labs   Lab 09/09/20  0901   WBC 9.88   HGB 14.8   HCT 45.0        CMP:   Recent Labs   Lab 09/09/20  0901      K 4.2      CO2 18*   GLU 89   BUN 16   CREATININE 0.9   CALCIUM 9.4   PROT 8.3   ALBUMIN 4.7   BILITOT 0.8   ALKPHOS 147*   AST 62*   ALT 60*   ANIONGAP 16   EGFRNONAA >60.0     Urine Studies: No results for input(s): COLORU, APPEARANCEUA, PHUR, SPECGRAV, PROTEINUA, GLUCUA, KETONESU, BILIRUBINUA, OCCULTUA, NITRITE, UROBILINOGEN, LEUKOCYTESUR, RBCUA, WBCUA, BACTERIA, SQUAMEPITHEL, HYALINECASTS in the last 48 hours.    Invalid input(s): WRIGHTSUR  All pertinent labs within the past 24 hours have been reviewed.    Significant Imaging: I have reviewed all pertinent imaging results/findings within the past 24 hours.
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
## 2020-09-09 NOTE — ED NOTES
Appearance:  Pt awake, alert & oriented to person, place & time. C-collar in place.  Skin:  Skin warm, dry & intact.  Mucous membranes dry. Skin turgor normal.     Respiratory:  Respirations even, non-labored.    Neurologic:  Pt moving all extremities without difficulty.  Sensation intact.  Mild shakes  Peripheral Vascular:  All peripheral pulses present.  Abdomen:  Abdomen soft, non-tender to palpation.   Musculoskeletal:  Tenderness to left ribs.  Left shoulder deformity noted-pt states not new  Eyes:  Bruising to left face & eye area.  Redness noted left eye.
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
## 2020-09-09 NOTE — ED NOTES
Pt resting comfortably but states he feels shaky.  Pt updated on status.  C-collar remains in place.  Side rails up, call button within reach.  Will continue to monitor.
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
## 2020-09-09 NOTE — SUBJECTIVE & OBJECTIVE
"Medications:  Continuous Infusions:  Scheduled Meds:   folic acid  1 mg Oral Daily    multivitamin  1 tablet Oral Daily     PRN Meds:acetaminophen, albuterol-ipratropium, dextrose 50%, dextrose 50%, glucagon (human recombinant), glucose, glucose, melatonin, ondansetron, ondansetron, sodium chloride 0.9%     No current facility-administered medications on file prior to encounter.      Current Outpatient Medications on File Prior to Encounter   Medication Sig    [DISCONTINUED] acetaminophen (TYLENOL) 325 MG tablet Take 2 tablets (650 mg total) by mouth every 4 (four) hours as needed.    [DISCONTINUED] methyl salicylate-menthol 15-10% 15-10 % Crea Apply topically 3 (three) times daily.    [DISCONTINUED] naproxen (NAPROSYN) 500 MG tablet Take 1 tablet (500 mg total) by mouth 2 (two) times daily with meals.       Review of patient's allergies indicates:  No Known Allergies    Past Medical History:   Diagnosis Date    Alcohol abuse     Depression     History of psychiatric hospitalization     Hx of psychiatric care     Psychiatric exam requested by authority     Psychiatric problem     Suicide attempt     Therapy     Withdrawal symptoms, alcohol      History reviewed. No pertinent surgical history.  Family History     Problem Relation (Age of Onset)    Alcohol abuse Paternal Grandmother    No Known Problems Mother, Father        Tobacco Use    Smoking status: Current Every Day Smoker     Packs/day: 1.00     Years: 30.00     Pack years: 30.00     Types: Cigarettes    Smokeless tobacco: Never Used   Substance and Sexual Activity    Alcohol use: Yes     Comment: drinkl " alot everyday- wine beer whatever I get my hands on"    Drug use: Yes     Types: Cocaine, Marijuana     Comment: "In the past, but not recently"    Sexual activity: Yes     Partners: Female     Review of Systems   Constitutional: Negative for fever.   HENT: Negative for congestion, ear discharge, ear pain, facial swelling, nosebleeds, " rhinorrhea, trouble swallowing and voice change.    Eyes: Negative.    Respiratory: Negative.    Cardiovascular: Positive for chest pain.   Gastrointestinal: Negative.    Endocrine: Negative.    Genitourinary: Negative.    Musculoskeletal: Positive for neck pain.   Allergic/Immunologic: Negative.    Neurological: Negative.    Hematological: Negative.      Objective:     Vital Signs (Most Recent):  Temp: 98.2 °F (36.8 °C) (09/09/20 0832)  Pulse: 104 (09/09/20 1505)  Resp: 16 (09/09/20 1505)  BP: 129/74 (09/09/20 1505)  SpO2: 98 % (09/09/20 1505) Vital Signs (24h Range):  Temp:  [98.2 °F (36.8 °C)] 98.2 °F (36.8 °C)  Pulse:  [] 104  Resp:  [16-20] 16  SpO2:  [98 %] 98 %  BP: (128-134)/(72-87) 129/74     Weight: 81.6 kg (180 lb)  Body mass index is 25.1 kg/m².        Physical Exam  Physical Exam    Vitals:    09/09/20 1505   BP: 129/74   Pulse: 104   Resp: 16   Temp:        General: AAOx3, NAD.  Right Ear: External Auditory Canal WNL,TM w/o masses/lesions/perforations/effusions.  Left Ear:  External Auditory Canal WNL,TM w/o masses/lesions/perforations/effusions.  Nose: Mild external deviation to the right. No obvious bony stepoffs. Inferior Turbinates WNL bilaterally.  No septal perforation or hematomas  No masses/lesions.  Oral Cavity: FOM Soft, no masses palpated. Oral Tongue mobile. Hard Palate WNL.  Oropharynx: BOT WNL.  No masses/lesions noted. Tonsillar fossa without lesions. Soft palate without masses. Midline uvula.  Neck: No palpable lymphadenopathy at levels I - VI. Full ROM. No thyroid nodules palpated. Palpable landmarks  Face: HB I bilaterally. Normal sensation.  Eyes: Some periorbital bruising/swelling. EOM intact bilaterally, PERRLA bilaterally. No exophthalmos/enopthalmos.  Neuro: CN II-XII grossly intact    FFL    Routine preparation with local atomizer with 1% neosynephrine/lidocaine with customary flexible endoscope.      Nasopharynx: No lesions. No abnormality of adenoids.  Posterior Choanae:  Patent bilaterally.  Eustachian Tubes: Patent bilaterally.  Posterior pharynx: No lesions.   Larynx/hypopharynx:    Epiglottis: No lesions, without edema.    AE Folds: No lesions.    Vocal cords: No polyps, nodules, ulcers or lesions.    Mobility: Equal and normal bilateral.    Hypopharynx: No lesions.    Piriform sinus: swelling of right piriform sinus   Post Cricoid: No erythema      Significant Labs:  All pertinent labs from the last 24 hours have been reviewed.    Significant Diagnostics:  I have reviewed and interpreted all pertinent imaging results/findings within the past 24 hours.   Nasal bone fractures  Fracture of hyoid and thyroid cartilage

## 2020-09-10 LAB
ANION GAP SERPL CALC-SCNC: 12 MMOL/L (ref 8–16)
BASOPHILS # BLD AUTO: 0.01 K/UL (ref 0–0.2)
BASOPHILS NFR BLD: 0.2 % (ref 0–1.9)
BUN SERPL-MCNC: 17 MG/DL (ref 6–20)
CALCIUM SERPL-MCNC: 9.2 MG/DL (ref 8.7–10.5)
CHLORIDE SERPL-SCNC: 103 MMOL/L (ref 95–110)
CO2 SERPL-SCNC: 22 MMOL/L (ref 23–29)
CREAT SERPL-MCNC: 0.8 MG/DL (ref 0.5–1.4)
DIFFERENTIAL METHOD: ABNORMAL
EOSINOPHIL # BLD AUTO: 0 K/UL (ref 0–0.5)
EOSINOPHIL NFR BLD: 0 % (ref 0–8)
ERYTHROCYTE [DISTWIDTH] IN BLOOD BY AUTOMATED COUNT: 12.4 % (ref 11.5–14.5)
EST. GFR  (AFRICAN AMERICAN): >60 ML/MIN/1.73 M^2
EST. GFR  (NON AFRICAN AMERICAN): >60 ML/MIN/1.73 M^2
GLUCOSE SERPL-MCNC: 116 MG/DL (ref 70–110)
HCT VFR BLD AUTO: 40 % (ref 40–54)
HGB BLD-MCNC: 13.5 G/DL (ref 14–18)
IMM GRANULOCYTES # BLD AUTO: 0.02 K/UL (ref 0–0.04)
IMM GRANULOCYTES NFR BLD AUTO: 0.3 % (ref 0–0.5)
LYMPHOCYTES # BLD AUTO: 1 K/UL (ref 1–4.8)
LYMPHOCYTES NFR BLD: 15.4 % (ref 18–48)
MAGNESIUM SERPL-MCNC: 2.3 MG/DL (ref 1.6–2.6)
MCH RBC QN AUTO: 29.7 PG (ref 27–31)
MCHC RBC AUTO-ENTMCNC: 33.8 G/DL (ref 32–36)
MCV RBC AUTO: 88 FL (ref 82–98)
MONOCYTES # BLD AUTO: 0.2 K/UL (ref 0.3–1)
MONOCYTES NFR BLD: 3 % (ref 4–15)
NEUTROPHILS # BLD AUTO: 5.1 K/UL (ref 1.8–7.7)
NEUTROPHILS NFR BLD: 81.1 % (ref 38–73)
NRBC BLD-RTO: 0 /100 WBC
PHOSPHATE SERPL-MCNC: 3.2 MG/DL (ref 2.7–4.5)
PLATELET # BLD AUTO: 214 K/UL (ref 150–350)
PMV BLD AUTO: 11 FL (ref 9.2–12.9)
POTASSIUM SERPL-SCNC: 4.6 MMOL/L (ref 3.5–5.1)
RBC # BLD AUTO: 4.54 M/UL (ref 4.6–6.2)
SODIUM SERPL-SCNC: 137 MMOL/L (ref 136–145)
WBC # BLD AUTO: 6.29 K/UL (ref 3.9–12.7)

## 2020-09-10 PROCEDURE — 96376 TX/PRO/DX INJ SAME DRUG ADON: CPT | Performed by: EMERGENCY MEDICINE

## 2020-09-10 PROCEDURE — 99226 PR SUBSEQUENT OBSERVATION CARE,LEVEL III: CPT | Mod: ,,, | Performed by: PHYSICIAN ASSISTANT

## 2020-09-10 PROCEDURE — 25000003 PHARM REV CODE 250: Performed by: PHYSICIAN ASSISTANT

## 2020-09-10 PROCEDURE — 83735 ASSAY OF MAGNESIUM: CPT

## 2020-09-10 PROCEDURE — 63600175 PHARM REV CODE 636 W HCPCS: Performed by: PHYSICIAN ASSISTANT

## 2020-09-10 PROCEDURE — G0378 HOSPITAL OBSERVATION PER HR: HCPCS

## 2020-09-10 PROCEDURE — 80048 BASIC METABOLIC PNL TOTAL CA: CPT

## 2020-09-10 PROCEDURE — 11000001 HC ACUTE MED/SURG PRIVATE ROOM

## 2020-09-10 PROCEDURE — 36415 COLL VENOUS BLD VENIPUNCTURE: CPT

## 2020-09-10 PROCEDURE — 85025 COMPLETE CBC W/AUTO DIFF WBC: CPT

## 2020-09-10 PROCEDURE — 84100 ASSAY OF PHOSPHORUS: CPT

## 2020-09-10 PROCEDURE — 94761 N-INVAS EAR/PLS OXIMETRY MLT: CPT

## 2020-09-10 PROCEDURE — 99226 PR SUBSEQUENT OBSERVATION CARE,LEVEL III: ICD-10-PCS | Mod: ,,, | Performed by: PHYSICIAN ASSISTANT

## 2020-09-10 RX ORDER — PREDNISONE 20 MG/1
40 TABLET ORAL DAILY
Status: COMPLETED | OUTPATIENT
Start: 2020-09-11 | End: 2020-09-13

## 2020-09-10 RX ORDER — THIAMINE HCL 100 MG
100 TABLET ORAL DAILY
Status: DISCONTINUED | OUTPATIENT
Start: 2020-09-10 | End: 2020-09-14 | Stop reason: HOSPADM

## 2020-09-10 RX ORDER — DIAZEPAM 5 MG/1
5 TABLET ORAL EVERY 6 HOURS
Status: COMPLETED | OUTPATIENT
Start: 2020-09-10 | End: 2020-09-10

## 2020-09-10 RX ADMIN — DIAZEPAM 5 MG: 5 TABLET ORAL at 05:09

## 2020-09-10 RX ADMIN — Medication 100 MG: at 08:09

## 2020-09-10 RX ADMIN — FOLIC ACID 1 MG: 1 TABLET ORAL at 08:09

## 2020-09-10 RX ADMIN — THERA TABS 1 TABLET: TAB at 08:09

## 2020-09-10 RX ADMIN — DEXAMETHASONE SODIUM PHOSPHATE 8 MG: 4 INJECTION, SOLUTION INTRA-ARTICULAR; INTRALESIONAL; INTRAMUSCULAR; INTRAVENOUS; SOFT TISSUE at 02:09

## 2020-09-10 RX ADMIN — DIAZEPAM 5 MG: 5 TABLET ORAL at 06:09

## 2020-09-10 RX ADMIN — DIAZEPAM 5 MG: 5 TABLET ORAL at 12:09

## 2020-09-10 RX ADMIN — DEXAMETHASONE SODIUM PHOSPHATE 8 MG: 4 INJECTION, SOLUTION INTRA-ARTICULAR; INTRALESIONAL; INTRAMUSCULAR; INTRAVENOUS; SOFT TISSUE at 05:09

## 2020-09-10 NOTE — PLAN OF CARE
SW met with patient at bedside. Patient verified information on FS. Patient was calm and cooperative during dc planning assessment. Patient admits use of illicit drugs/ETOH. Patient is currently homeless. Patient requested inpatient substance abuse resources. SW provided resources per request and explained the process. SW informed patient that he would have to make first contact to his preferred facility and complete the intake process. SW/CM to arrange transportation at DC to preferred accepting facility. SW/CM will assist patient with admission process as needed.     Primary Doctor No    [id number] [place] - [address]  [address]  [place] [address]  Phone: [phone] Fax: [phone]       09/10/20 1047   Discharge Assessment   Assessment Type Discharge Planning Assessment   Confirmed/corrected address and phone number on facesheet? Yes   Assessment information obtained from? Patient   Expected Length of Stay (days) 1   Communicated expected length of stay with patient/caregiver yes   Prior to hospitilization cognitive status: Alert/Oriented   Prior to hospitalization functional status: Independent   Current cognitive status: Alert/Oriented   Current Functional Status: Independent   Lives With other (see comments)  (homeless)   Is patient able to care for self after discharge? Yes   Patient's perception of discharge disposition home or selfcare;shelter   Patient currently being followed by outpatient case management? No   Patient currently receives any other outside agency services? No   Equipment Currently Used at Home none   Do you have any problems affording any of your prescribed medications? No   Does the patient have transportation home? No   Discharge Plan A Other;Shelter   Discharge Plan B Other;Shelter   DME Needed Upon Discharge  none   Patient/Family in Agreement with Plan yes       Christine Mederos LMSW  [facility]  [id number]
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
## 2020-09-10 NOTE — PLAN OF CARE
NIALL faxed clinicals to [facility] per patient and facility request. [phone]. SW will continue to assist with needs.    Christine Mederos, LMSW [facility]  [id number]
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
## 2020-09-10 NOTE — SUBJECTIVE & OBJECTIVE
Interval History: Patient reports improvement in pain. He acknowledges his alcohol dependence and expresses interest in cessation. He has successfully detoxed in the past and denies previous history of seizures from alcohol withdrawals. ENT recommending steroid taper. Will continue daily valium taper and monitor for withdrawal symptoms. Discussing with pharmacy about valium taper recs    Review of Systems   Constitutional: Negative for chills and fever.   HENT: Positive for facial swelling. Negative for trouble swallowing.    Eyes: Positive for pain and redness. Negative for photophobia and visual disturbance.   Respiratory: Negative for chest tightness and shortness of breath.    Cardiovascular: Negative for chest pain and leg swelling.   Gastrointestinal: Negative for abdominal pain and nausea.   Musculoskeletal: Positive for neck pain.   Skin: Positive for wound.   Neurological: Positive for tremors and syncope. Negative for dizziness and headaches.   Psychiatric/Behavioral: Negative for agitation, confusion and hallucinations.     Objective:     Vital Signs (Most Recent):  Temp: 97 °F (36.1 °C) (09/10/20 1148)  Pulse: 82 (09/10/20 1148)  Resp: 18 (09/10/20 1148)  BP: 122/73 (09/10/20 1148)  SpO2: (!) 94 % (09/10/20 1148) Vital Signs (24h Range):  Temp:  [96.2 °F (35.7 °C)-97.3 °F (36.3 °C)] 97 °F (36.1 °C)  Pulse:  [] 82  Resp:  [16-18] 18  SpO2:  [94 %-98 %] 94 %  BP: (122-142)/(72-95) 122/73     Weight: 77.9 kg (171 lb 10.1 oz)  Body mass index is 23.94 kg/m².  No intake or output data in the 24 hours ending 09/10/20 1400   Physical Exam  Vitals signs and nursing note reviewed.   Constitutional:       General: He is not in acute distress.     Appearance: He is well-developed. He is not ill-appearing.   HENT:      Nose:      Comments: Fairly symmetric, no obvious deformity  Eyes:      Pupils: Pupils are equal, round, and reactive to light.      Comments: Subconjunctival hemorrhage inferior-lateral aspect  of L eye.   L periorbital ecchymosis.   TTP inferior-lateral aspect of L eye.   No pain with EOM.  VA: R20/25, L20/20, BL 20/20   Cardiovascular:      Rate and Rhythm: Normal rate and regular rhythm.   Pulmonary:      Effort: Pulmonary effort is normal.      Breath sounds: Normal breath sounds.      Comments: pt unable to take deep inspiration 2/2 pain  Abdominal:      Palpations: Abdomen is soft.      Tenderness: There is no abdominal tenderness.   Musculoskeletal:      Comments: TTP L anterior-lateral ribs, no ecchymoses or deformity noted.   Skin:     General: Skin is warm and dry.   Neurological:      General: No focal deficit present.      Mental Status: He is alert and oriented to person, place, and time.      Motor: Tremor (upper extremities, RUE>LUE) present.      Comments: Follows commands appropriately.    Psychiatric:         Behavior: Behavior normal.         Significant Labs:   CBC:   Recent Labs   Lab 09/09/20  0901 09/10/20  0346   WBC 9.88 6.29   HGB 14.8 13.5*   HCT 45.0 40.0    214     CMP:   Recent Labs   Lab 09/09/20  0901 09/10/20  0346    137   K 4.2 4.6    103   CO2 18* 22*   GLU 89 116*   BUN 16 17   CREATININE 0.9 0.8   CALCIUM 9.4 9.2   PROT 8.3  --    ALBUMIN 4.7  --    BILITOT 0.8  --    ALKPHOS 147*  --    AST 62*  --    ALT 60*  --    ANIONGAP 16 12   EGFRNONAA >60.0 >60.0     Magnesium:   Recent Labs   Lab 09/09/20  0901 09/10/20  0346   MG 2.1 2.3       Significant Imaging: I have reviewed all pertinent imaging results/findings within the past 24 hours.
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
## 2020-09-10 NOTE — PLAN OF CARE
Pt was received from ED to room 727, pt walked into room. Pt is AAO x 4, VSS, no distress noted. Will continue to monitor.

## 2020-09-10 NOTE — SUBJECTIVE & OBJECTIVE
Interval History: No issues overnight    Medications:  Continuous Infusions:  Scheduled Meds:   dexamethasone  8 mg Intravenous Q8H    diazePAM  5 mg Oral Q8H    folic acid  1 mg Oral Daily    multivitamin  1 tablet Oral Daily     PRN Meds:acetaminophen, albuterol-ipratropium, dextrose 50%, dextrose 50%, glucagon (human recombinant), glucose, glucose, LORazepam, melatonin, ondansetron, ondansetron, sodium chloride 0.9%     Review of patient's allergies indicates:  No Known Allergies  Objective:     Vital Signs (24h Range):  Temp:  [96.2 °F (35.7 °C)-98.2 °F (36.8 °C)] 96.5 °F (35.8 °C)  Pulse:  [] 79  Resp:  [16-20] 18  SpO2:  [95 %-98 %] 97 %  BP: (128-142)/(72-95) 142/95       Lines/Drains/Airways     Peripheral Intravenous Line                 Peripheral IV - Single Lumen 09/09/20 18 G Left Forearm 1 day                Physical Exam  General: AAOx3, NAD.  Right Ear: External Auditory Canal WNL,TM w/o masses/lesions/perforations/effusions.  Left Ear:  External Auditory Canal WNL,TM w/o masses/lesions/perforations/effusions.  Nose: Mild external deviation to the right. No obvious bony stepoffs. Inferior Turbinates WNL bilaterally.  No septal perforation or hematomas  No masses/lesions.  Oral Cavity: FOM Soft, no masses palpated. Oral Tongue mobile. Hard Palate WNL.  Oropharynx: BOT WNL.  No masses/lesions noted. Tonsillar fossa without lesions. Soft palate without masses. Midline uvula.  Neck: No palpable lymphadenopathy at levels I - VI. Full ROM. No thyroid nodules palpated. Palpable landmarks  Face: HB I bilaterally. Normal sensation.  Eyes: Some periorbital bruising/swelling. EOM intact bilaterally, PERRLA bilaterally. No exophthalmos/enopthalmos.  Neuro: CN II-XII grossly intact     Significant Labs:  CBC:   Recent Labs   Lab 09/10/20  0346   WBC 6.29   RBC 4.54*   HGB 13.5*   HCT 40.0      MCV 88   MCH 29.7   MCHC 33.8     CMP:   Recent Labs   Lab 09/09/20  0901 09/10/20  0346   GLU 89 116*    CALCIUM 9.4 9.2   ALBUMIN 4.7  --    PROT 8.3  --     137   K 4.2 4.6   CO2 18* 22*    103   BUN 16 17   CREATININE 0.9 0.8   ALKPHOS 147*  --    ALT 60*  --    AST 62*  --    BILITOT 0.8  --        Significant Diagnostics:  None
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
## 2020-09-11 LAB
ANION GAP SERPL CALC-SCNC: 8 MMOL/L (ref 8–16)
BASOPHILS # BLD AUTO: 0.02 K/UL (ref 0–0.2)
BASOPHILS NFR BLD: 0.2 % (ref 0–1.9)
BUN SERPL-MCNC: 18 MG/DL (ref 6–20)
CALCIUM SERPL-MCNC: 8.9 MG/DL (ref 8.7–10.5)
CHLORIDE SERPL-SCNC: 104 MMOL/L (ref 95–110)
CO2 SERPL-SCNC: 25 MMOL/L (ref 23–29)
CREAT SERPL-MCNC: 0.8 MG/DL (ref 0.5–1.4)
DIFFERENTIAL METHOD: ABNORMAL
EOSINOPHIL # BLD AUTO: 0 K/UL (ref 0–0.5)
EOSINOPHIL NFR BLD: 0 % (ref 0–8)
ERYTHROCYTE [DISTWIDTH] IN BLOOD BY AUTOMATED COUNT: 12.4 % (ref 11.5–14.5)
EST. GFR  (AFRICAN AMERICAN): >60 ML/MIN/1.73 M^2
EST. GFR  (NON AFRICAN AMERICAN): >60 ML/MIN/1.73 M^2
GLUCOSE SERPL-MCNC: 125 MG/DL (ref 70–110)
HCT VFR BLD AUTO: 37.9 % (ref 40–54)
HGB BLD-MCNC: 12.8 G/DL (ref 14–18)
IMM GRANULOCYTES # BLD AUTO: 0.05 K/UL (ref 0–0.04)
IMM GRANULOCYTES NFR BLD AUTO: 0.4 % (ref 0–0.5)
LYMPHOCYTES # BLD AUTO: 1.3 K/UL (ref 1–4.8)
LYMPHOCYTES NFR BLD: 10.6 % (ref 18–48)
MAGNESIUM SERPL-MCNC: 2.2 MG/DL (ref 1.6–2.6)
MCH RBC QN AUTO: 30.3 PG (ref 27–31)
MCHC RBC AUTO-ENTMCNC: 33.8 G/DL (ref 32–36)
MCV RBC AUTO: 90 FL (ref 82–98)
MONOCYTES # BLD AUTO: 0.9 K/UL (ref 0.3–1)
MONOCYTES NFR BLD: 7.4 % (ref 4–15)
NEUTROPHILS # BLD AUTO: 9.9 K/UL (ref 1.8–7.7)
NEUTROPHILS NFR BLD: 81.4 % (ref 38–73)
NRBC BLD-RTO: 0 /100 WBC
PHOSPHATE SERPL-MCNC: 2.8 MG/DL (ref 2.7–4.5)
PLATELET # BLD AUTO: 211 K/UL (ref 150–350)
PMV BLD AUTO: 11 FL (ref 9.2–12.9)
POTASSIUM SERPL-SCNC: 4.5 MMOL/L (ref 3.5–5.1)
RBC # BLD AUTO: 4.22 M/UL (ref 4.6–6.2)
SODIUM SERPL-SCNC: 137 MMOL/L (ref 136–145)
WBC # BLD AUTO: 12.12 K/UL (ref 3.9–12.7)

## 2020-09-11 PROCEDURE — 63600175 PHARM REV CODE 636 W HCPCS: Performed by: PHYSICIAN ASSISTANT

## 2020-09-11 PROCEDURE — 25000003 PHARM REV CODE 250: Performed by: PHYSICIAN ASSISTANT

## 2020-09-11 PROCEDURE — 99226 PR SUBSEQUENT OBSERVATION CARE,LEVEL III: CPT | Mod: ,,, | Performed by: PHYSICIAN ASSISTANT

## 2020-09-11 PROCEDURE — 94761 N-INVAS EAR/PLS OXIMETRY MLT: CPT

## 2020-09-11 PROCEDURE — 36415 COLL VENOUS BLD VENIPUNCTURE: CPT

## 2020-09-11 PROCEDURE — 84100 ASSAY OF PHOSPHORUS: CPT

## 2020-09-11 PROCEDURE — 83735 ASSAY OF MAGNESIUM: CPT

## 2020-09-11 PROCEDURE — 94799 UNLISTED PULMONARY SVC/PX: CPT

## 2020-09-11 PROCEDURE — 11000001 HC ACUTE MED/SURG PRIVATE ROOM

## 2020-09-11 PROCEDURE — 99900035 HC TECH TIME PER 15 MIN (STAT)

## 2020-09-11 PROCEDURE — 80048 BASIC METABOLIC PNL TOTAL CA: CPT

## 2020-09-11 PROCEDURE — 99226 PR SUBSEQUENT OBSERVATION CARE,LEVEL III: ICD-10-PCS | Mod: ,,, | Performed by: PHYSICIAN ASSISTANT

## 2020-09-11 PROCEDURE — 85025 COMPLETE CBC W/AUTO DIFF WBC: CPT

## 2020-09-11 RX ORDER — DIAZEPAM 5 MG/1
10 TABLET ORAL EVERY 8 HOURS
Status: DISCONTINUED | OUTPATIENT
Start: 2020-09-11 | End: 2020-09-11

## 2020-09-11 RX ORDER — DIAZEPAM 5 MG/1
5 TABLET ORAL EVERY 6 HOURS
Status: DISCONTINUED | OUTPATIENT
Start: 2020-09-12 | End: 2020-09-11

## 2020-09-11 RX ORDER — DIAZEPAM 5 MG/1
10 TABLET ORAL EVERY 8 HOURS
Status: COMPLETED | OUTPATIENT
Start: 2020-09-11 | End: 2020-09-11

## 2020-09-11 RX ORDER — OXYCODONE HYDROCHLORIDE 5 MG/1
5 TABLET ORAL EVERY 6 HOURS PRN
Status: DISCONTINUED | OUTPATIENT
Start: 2020-09-11 | End: 2020-09-14 | Stop reason: HOSPADM

## 2020-09-11 RX ORDER — DIAZEPAM 5 MG/1
10 TABLET ORAL EVERY 6 HOURS
Status: DISCONTINUED | OUTPATIENT
Start: 2020-09-11 | End: 2020-09-11

## 2020-09-11 RX ADMIN — FOLIC ACID 1 MG: 1 TABLET ORAL at 10:09

## 2020-09-11 RX ADMIN — MELATONIN TAB 3 MG 6 MG: 3 TAB at 12:09

## 2020-09-11 RX ADMIN — DIAZEPAM 10 MG: 5 TABLET ORAL at 10:09

## 2020-09-11 RX ADMIN — OXYCODONE 5 MG: 5 TABLET ORAL at 03:09

## 2020-09-11 RX ADMIN — OXYCODONE 5 MG: 5 TABLET ORAL at 10:09

## 2020-09-11 RX ADMIN — PREDNISONE 40 MG: 20 TABLET ORAL at 10:09

## 2020-09-11 RX ADMIN — THERA TABS 1 TABLET: TAB at 10:09

## 2020-09-11 RX ADMIN — Medication 100 MG: at 10:09

## 2020-09-11 RX ADMIN — MELATONIN TAB 3 MG 6 MG: 3 TAB at 10:09

## 2020-09-11 NOTE — PLAN OF CARE
Plan of care reviewed with patient. Verbalized understand. Facial swelling noted. Tremors to hands  Vitals WNL. Rested well through out night. No falls or injury noted this shift. All safety measures maintained.

## 2020-09-11 NOTE — PLAN OF CARE
Pt has been showing no signs of withdraws, has been maintained by medications. Pt complaints of rib pain while sleeping. Will continue to monitor.
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
## 2020-09-11 NOTE — SUBJECTIVE & OBJECTIVE
Interval History: Maia cute events over night. Pt complaining of pain to ribs and face, will give low dose oxy. Discussed valium taper with addiction psych, appreciate assistance. Plan to keep in house to detox until then, pt in agreement    Review of Systems   Constitutional: Negative for chills and fever.   HENT: Positive for facial swelling. Negative for trouble swallowing.    Eyes: Positive for pain and redness. Negative for photophobia and visual disturbance.   Respiratory: Negative for chest tightness and shortness of breath.    Cardiovascular: Negative for chest pain and leg swelling.   Gastrointestinal: Negative for abdominal pain.   Musculoskeletal: Positive for neck pain.        Chest wall pain 2/2 rib fx   Skin: Positive for wound.   Neurological: Positive for tremors and syncope. Negative for dizziness and headaches.   Psychiatric/Behavioral: Negative for agitation, confusion and hallucinations.     Objective:     Vital Signs (Most Recent):  Temp: 96.9 °F (36.1 °C) (09/11/20 1130)  Pulse: 76 (09/11/20 1130)  Resp: 18 (09/11/20 1130)  BP: 133/81 (09/11/20 1130)  SpO2: 96 % (09/11/20 1130) Vital Signs (24h Range):  Temp:  [96 °F (35.6 °C)-97.9 °F (36.6 °C)] 96.9 °F (36.1 °C)  Pulse:  [63-90] 76  Resp:  [16-19] 18  SpO2:  [94 %-98 %] 96 %  BP: (114-138)/(66-81) 133/81     Weight: 77.9 kg (171 lb 10.1 oz)  Body mass index is 23.94 kg/m².  No intake or output data in the 24 hours ending 09/11/20 1139   Physical Exam  Vitals signs and nursing note reviewed.   Constitutional:       General: He is not in acute distress.     Appearance: He is well-developed. He is not ill-appearing.   HENT:      Nose:      Comments: Fairly symmetric, no obvious deformity  Eyes:      Pupils: Pupils are equal, round, and reactive to light.      Comments: Subconjunctival hemorrhage inferior-lateral aspect of L eye.   L periorbital ecchymosis.   TTP inferior-lateral aspect of L eye.   No pain with EOM.  VA: R20/25, L20/20, BL 20/20    Cardiovascular:      Rate and Rhythm: Normal rate and regular rhythm.   Pulmonary:      Effort: Pulmonary effort is normal.      Breath sounds: Normal breath sounds.      Comments: pt unable to take deep inspiration 2/2 pain  Musculoskeletal:      Comments: TTP L anterior-lateral ribs, no ecchymoses or deformity noted.   Skin:     General: Skin is warm and dry.   Neurological:      General: No focal deficit present.      Mental Status: He is alert and oriented to person, place, and time.      Motor: Tremor (upper extremities, RUE>LUE) present.      Comments: Follows commands appropriately.    Psychiatric:         Mood and Affect: Mood normal.         Behavior: Behavior normal.         Significant Labs: All pertinent labs within the past 24 hours have been reviewed.    Significant Imaging: I have reviewed all pertinent imaging results/findings within the past 24 hours.
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
## 2020-09-12 LAB
ANION GAP SERPL CALC-SCNC: 9 MMOL/L (ref 8–16)
BASOPHILS # BLD AUTO: 0.03 K/UL (ref 0–0.2)
BASOPHILS NFR BLD: 0.4 % (ref 0–1.9)
BUN SERPL-MCNC: 15 MG/DL (ref 6–20)
CALCIUM SERPL-MCNC: 8.3 MG/DL (ref 8.7–10.5)
CHLORIDE SERPL-SCNC: 103 MMOL/L (ref 95–110)
CO2 SERPL-SCNC: 26 MMOL/L (ref 23–29)
CREAT SERPL-MCNC: 0.8 MG/DL (ref 0.5–1.4)
DIFFERENTIAL METHOD: ABNORMAL
EOSINOPHIL # BLD AUTO: 0.1 K/UL (ref 0–0.5)
EOSINOPHIL NFR BLD: 0.7 % (ref 0–8)
ERYTHROCYTE [DISTWIDTH] IN BLOOD BY AUTOMATED COUNT: 12.5 % (ref 11.5–14.5)
EST. GFR  (AFRICAN AMERICAN): >60 ML/MIN/1.73 M^2
EST. GFR  (NON AFRICAN AMERICAN): >60 ML/MIN/1.73 M^2
GLUCOSE SERPL-MCNC: 89 MG/DL (ref 70–110)
HCT VFR BLD AUTO: 38.7 % (ref 40–54)
HGB BLD-MCNC: 12.4 G/DL (ref 14–18)
IMM GRANULOCYTES # BLD AUTO: 0.07 K/UL (ref 0–0.04)
IMM GRANULOCYTES NFR BLD AUTO: 0.9 % (ref 0–0.5)
LYMPHOCYTES # BLD AUTO: 3.4 K/UL (ref 1–4.8)
LYMPHOCYTES NFR BLD: 43.9 % (ref 18–48)
MAGNESIUM SERPL-MCNC: 2.1 MG/DL (ref 1.6–2.6)
MCH RBC QN AUTO: 29.6 PG (ref 27–31)
MCHC RBC AUTO-ENTMCNC: 32 G/DL (ref 32–36)
MCV RBC AUTO: 92 FL (ref 82–98)
MONOCYTES # BLD AUTO: 0.5 K/UL (ref 0.3–1)
MONOCYTES NFR BLD: 6.7 % (ref 4–15)
NEUTROPHILS # BLD AUTO: 3.6 K/UL (ref 1.8–7.7)
NEUTROPHILS NFR BLD: 47.4 % (ref 38–73)
NRBC BLD-RTO: 0 /100 WBC
PHOSPHATE SERPL-MCNC: 3 MG/DL (ref 2.7–4.5)
PLATELET # BLD AUTO: 184 K/UL (ref 150–350)
PMV BLD AUTO: 11.1 FL (ref 9.2–12.9)
POTASSIUM SERPL-SCNC: 4.2 MMOL/L (ref 3.5–5.1)
RBC # BLD AUTO: 4.19 M/UL (ref 4.6–6.2)
SODIUM SERPL-SCNC: 138 MMOL/L (ref 136–145)
WBC # BLD AUTO: 7.66 K/UL (ref 3.9–12.7)

## 2020-09-12 PROCEDURE — 84100 ASSAY OF PHOSPHORUS: CPT

## 2020-09-12 PROCEDURE — 36415 COLL VENOUS BLD VENIPUNCTURE: CPT

## 2020-09-12 PROCEDURE — 63600175 PHARM REV CODE 636 W HCPCS: Performed by: PHYSICIAN ASSISTANT

## 2020-09-12 PROCEDURE — 25000003 PHARM REV CODE 250: Performed by: PHYSICIAN ASSISTANT

## 2020-09-12 PROCEDURE — 99233 PR SUBSEQUENT HOSPITAL CARE,LEVL III: ICD-10-PCS | Mod: ,,, | Performed by: PHYSICIAN ASSISTANT

## 2020-09-12 PROCEDURE — 11000001 HC ACUTE MED/SURG PRIVATE ROOM

## 2020-09-12 PROCEDURE — 85025 COMPLETE CBC W/AUTO DIFF WBC: CPT

## 2020-09-12 PROCEDURE — 83735 ASSAY OF MAGNESIUM: CPT

## 2020-09-12 PROCEDURE — 99900035 HC TECH TIME PER 15 MIN (STAT)

## 2020-09-12 PROCEDURE — 99233 SBSQ HOSP IP/OBS HIGH 50: CPT | Mod: ,,, | Performed by: PHYSICIAN ASSISTANT

## 2020-09-12 PROCEDURE — 80048 BASIC METABOLIC PNL TOTAL CA: CPT

## 2020-09-12 PROCEDURE — 94761 N-INVAS EAR/PLS OXIMETRY MLT: CPT

## 2020-09-12 RX ORDER — DIAZEPAM 5 MG/1
5 TABLET ORAL EVERY 8 HOURS
Status: DISCONTINUED | OUTPATIENT
Start: 2020-09-12 | End: 2020-09-13

## 2020-09-12 RX ADMIN — OXYCODONE 5 MG: 5 TABLET ORAL at 08:09

## 2020-09-12 RX ADMIN — DIAZEPAM 5 MG: 5 TABLET ORAL at 02:09

## 2020-09-12 RX ADMIN — DIAZEPAM 5 MG: 5 TABLET ORAL at 10:09

## 2020-09-12 RX ADMIN — FOLIC ACID 1 MG: 1 TABLET ORAL at 08:09

## 2020-09-12 RX ADMIN — MELATONIN TAB 3 MG 6 MG: 3 TAB at 09:09

## 2020-09-12 RX ADMIN — THERA TABS 1 TABLET: TAB at 08:09

## 2020-09-12 RX ADMIN — LORAZEPAM 2 MG: 1 TABLET ORAL at 03:09

## 2020-09-12 RX ADMIN — Medication 100 MG: at 08:09

## 2020-09-12 RX ADMIN — OXYCODONE 5 MG: 5 TABLET ORAL at 09:09

## 2020-09-12 RX ADMIN — PREDNISONE 40 MG: 20 TABLET ORAL at 08:09

## 2020-09-12 RX ADMIN — DIAZEPAM 5 MG: 5 TABLET ORAL at 09:09

## 2020-09-12 RX ADMIN — LORAZEPAM 2 MG: 1 TABLET ORAL at 08:09

## 2020-09-12 RX ADMIN — OXYCODONE 5 MG: 5 TABLET ORAL at 03:09
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
## 2020-09-12 NOTE — SUBJECTIVE & OBJECTIVE
Interval History: no acute events overnight. VSS. Pt reports being more comfortable/sleeping through the night with the low dose PRN oxy. Plan to dc to rehab mon    Review of Systems   HENT: Positive for facial swelling. Negative for trouble swallowing.    Eyes: Positive for pain and redness. Negative for photophobia and visual disturbance.   Respiratory: Negative for shortness of breath.    Cardiovascular: Negative for chest pain.   Gastrointestinal: Negative for abdominal pain.   Musculoskeletal: Positive for neck pain.        Chest wall pain 2/2 rib fx   Skin: Positive for wound.   Neurological: Positive for tremors (resolving) and syncope.   Psychiatric/Behavioral: Negative for agitation, confusion and hallucinations.     Objective:     Vital Signs (Most Recent):  Temp: 98 °F (36.7 °C) (09/12/20 0823)  Pulse: 72 (09/12/20 0751)  Resp: 16 (09/12/20 0836)  BP: 106/62 (09/12/20 0751)  SpO2: 96 % (09/12/20 0007) Vital Signs (24h Range):  Temp:  [96.8 °F (36 °C)-98.2 °F (36.8 °C)] 98 °F (36.7 °C)  Pulse:  [64-76] 72  Resp:  [16-18] 16  SpO2:  [94 %-97 %] 96 %  BP: ()/(57-84) 106/62     Weight: 77.9 kg (171 lb 10.1 oz)  Body mass index is 23.94 kg/m².  No intake or output data in the 24 hours ending 09/12/20 1031   Physical Exam  Vitals signs and nursing note reviewed.   Constitutional:       General: He is not in acute distress.     Appearance: He is well-developed. He is not ill-appearing.   HENT:      Nose:      Comments: Fairly symmetric, no obvious deformity  Eyes:      Pupils: Pupils are equal, round, and reactive to light.      Comments: Subconjunctival hemorrhage inferior-lateral aspect of L eye.   L periorbital ecchymosis.   TTP inferior-lateral aspect of L eye.   No pain with EOM.  VA: R20/25, L20/20, BL 20/20   Cardiovascular:      Rate and Rhythm: Normal rate and regular rhythm.   Pulmonary:      Effort: Pulmonary effort is normal.      Breath sounds: Normal breath sounds.      Comments: pt unable to  take deep inspiration 2/2 pain  Musculoskeletal:      Comments: TTP L anterior-lateral ribs, no ecchymoses or deformity noted.   Skin:     General: Skin is warm and dry.   Neurological:      General: No focal deficit present.      Mental Status: He is alert and oriented to person, place, and time.      Motor: No tremor.      Comments: Follows commands appropriately.    Psychiatric:         Mood and Affect: Mood normal.         Behavior: Behavior normal.         Significant Labs: All pertinent labs within the past 24 hours have been reviewed.    Significant Imaging: I have reviewed all pertinent imaging results/findings within the past 24 hours.

## 2020-09-13 LAB
ANION GAP SERPL CALC-SCNC: 8 MMOL/L (ref 8–16)
BASOPHILS # BLD AUTO: 0.04 K/UL (ref 0–0.2)
BASOPHILS NFR BLD: 0.5 % (ref 0–1.9)
BUN SERPL-MCNC: 18 MG/DL (ref 6–20)
CALCIUM SERPL-MCNC: 8.4 MG/DL (ref 8.7–10.5)
CHLORIDE SERPL-SCNC: 103 MMOL/L (ref 95–110)
CO2 SERPL-SCNC: 25 MMOL/L (ref 23–29)
CREAT SERPL-MCNC: 0.8 MG/DL (ref 0.5–1.4)
DIFFERENTIAL METHOD: ABNORMAL
EOSINOPHIL # BLD AUTO: 0.2 K/UL (ref 0–0.5)
EOSINOPHIL NFR BLD: 2.5 % (ref 0–8)
ERYTHROCYTE [DISTWIDTH] IN BLOOD BY AUTOMATED COUNT: 12.3 % (ref 11.5–14.5)
EST. GFR  (AFRICAN AMERICAN): >60 ML/MIN/1.73 M^2
EST. GFR  (NON AFRICAN AMERICAN): >60 ML/MIN/1.73 M^2
GLUCOSE SERPL-MCNC: 95 MG/DL (ref 70–110)
HCT VFR BLD AUTO: 40.2 % (ref 40–54)
HGB BLD-MCNC: 13.2 G/DL (ref 14–18)
IMM GRANULOCYTES # BLD AUTO: 0.11 K/UL (ref 0–0.04)
IMM GRANULOCYTES NFR BLD AUTO: 1.4 % (ref 0–0.5)
LYMPHOCYTES # BLD AUTO: 3.6 K/UL (ref 1–4.8)
LYMPHOCYTES NFR BLD: 46.2 % (ref 18–48)
MAGNESIUM SERPL-MCNC: 2 MG/DL (ref 1.6–2.6)
MCH RBC QN AUTO: 29.9 PG (ref 27–31)
MCHC RBC AUTO-ENTMCNC: 32.8 G/DL (ref 32–36)
MCV RBC AUTO: 91 FL (ref 82–98)
MONOCYTES # BLD AUTO: 0.5 K/UL (ref 0.3–1)
MONOCYTES NFR BLD: 6 % (ref 4–15)
NEUTROPHILS # BLD AUTO: 3.4 K/UL (ref 1.8–7.7)
NEUTROPHILS NFR BLD: 43.4 % (ref 38–73)
NRBC BLD-RTO: 0 /100 WBC
PHOSPHATE SERPL-MCNC: 4.3 MG/DL (ref 2.7–4.5)
PLATELET # BLD AUTO: 186 K/UL (ref 150–350)
PMV BLD AUTO: 10.9 FL (ref 9.2–12.9)
POTASSIUM SERPL-SCNC: 4.2 MMOL/L (ref 3.5–5.1)
RBC # BLD AUTO: 4.41 M/UL (ref 4.6–6.2)
SODIUM SERPL-SCNC: 136 MMOL/L (ref 136–145)
WBC # BLD AUTO: 7.86 K/UL (ref 3.9–12.7)

## 2020-09-13 PROCEDURE — 11000001 HC ACUTE MED/SURG PRIVATE ROOM

## 2020-09-13 PROCEDURE — 84100 ASSAY OF PHOSPHORUS: CPT

## 2020-09-13 PROCEDURE — 25000003 PHARM REV CODE 250: Performed by: PHYSICIAN ASSISTANT

## 2020-09-13 PROCEDURE — 63600175 PHARM REV CODE 636 W HCPCS: Performed by: PHYSICIAN ASSISTANT

## 2020-09-13 PROCEDURE — 85025 COMPLETE CBC W/AUTO DIFF WBC: CPT

## 2020-09-13 PROCEDURE — 36415 COLL VENOUS BLD VENIPUNCTURE: CPT

## 2020-09-13 PROCEDURE — 99232 SBSQ HOSP IP/OBS MODERATE 35: CPT | Mod: ,,, | Performed by: PHYSICIAN ASSISTANT

## 2020-09-13 PROCEDURE — 83735 ASSAY OF MAGNESIUM: CPT

## 2020-09-13 PROCEDURE — 80048 BASIC METABOLIC PNL TOTAL CA: CPT

## 2020-09-13 PROCEDURE — 99232 PR SUBSEQUENT HOSPITAL CARE,LEVL II: ICD-10-PCS | Mod: ,,, | Performed by: PHYSICIAN ASSISTANT

## 2020-09-13 RX ORDER — DIPHENHYDRAMINE HCL 25 MG
25 CAPSULE ORAL EVERY 6 HOURS PRN
Status: DISCONTINUED | OUTPATIENT
Start: 2020-09-13 | End: 2020-09-14 | Stop reason: HOSPADM

## 2020-09-13 RX ORDER — DIAZEPAM 5 MG/1
5 TABLET ORAL 2 TIMES DAILY
Status: DISCONTINUED | OUTPATIENT
Start: 2020-09-13 | End: 2020-09-14

## 2020-09-13 RX ADMIN — OXYCODONE 5 MG: 5 TABLET ORAL at 03:09

## 2020-09-13 RX ADMIN — DIAZEPAM 5 MG: 5 TABLET ORAL at 09:09

## 2020-09-13 RX ADMIN — LORAZEPAM 2 MG: 1 TABLET ORAL at 03:09

## 2020-09-13 RX ADMIN — THERA TABS 1 TABLET: TAB at 09:09

## 2020-09-13 RX ADMIN — DIAZEPAM 5 MG: 5 TABLET ORAL at 05:09

## 2020-09-13 RX ADMIN — LORAZEPAM 2 MG: 1 TABLET ORAL at 09:09

## 2020-09-13 RX ADMIN — FOLIC ACID 1 MG: 1 TABLET ORAL at 09:09

## 2020-09-13 RX ADMIN — MELATONIN TAB 3 MG 6 MG: 3 TAB at 09:09

## 2020-09-13 RX ADMIN — OXYCODONE 5 MG: 5 TABLET ORAL at 04:09

## 2020-09-13 RX ADMIN — Medication 100 MG: at 09:09

## 2020-09-13 RX ADMIN — OXYCODONE 5 MG: 5 TABLET ORAL at 09:09

## 2020-09-13 RX ADMIN — PREDNISONE 40 MG: 20 TABLET ORAL at 09:09

## 2020-09-13 NOTE — NURSING
Patient arrived on I [facility] via wheelchair with nurse in attendance.  No report was called.  Called to charge nurse to report.  VSS .  Afebrile.  Oriented patient to room,.  Gave chicken noodle soup to patient as requested. Will continue to monitor patient.

## 2020-09-13 NOTE — PLAN OF CARE
09/13/20 1409   Post-Acute Status   Post-Acute Authorization Other   Other Status Community Services       [facility] [phone] to assist with alcohol rehab acceptance post discharge. No answer. CM left a voicemail.     Tommie Maldonado  RN Case Manager   #[phone]
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
## 2020-09-13 NOTE — PLAN OF CARE
Problem: Adult Inpatient Plan of Care  Goal: Plan of Care Review  Outcome: Ongoing, Progressing    Pt AOx4. No acute events noted during shift.  Had c/o pain, oxycodone given prn per MD orders. Neuro checks completed. Bed remained low, locked, with all personal items in reach. Will continue to monitor.

## 2020-09-13 NOTE — PLAN OF CARE
Problem: Adult Inpatient Plan of Care  Goal: Plan of Care Review  Outcome: Ongoing, Progressing     Problem: Adult Inpatient Plan of Care  Goal: Patient-Specific Goal (Individualization)  Outcome: Ongoing, Progressing     Problem: Adult Inpatient Plan of Care  Goal: Absence of Hospital-Acquired Illness or Injury  Outcome: Ongoing, Progressing     Problem: Adult Inpatient Plan of Care  Goal: Optimal Comfort and Wellbeing  Outcome: Ongoing, Progressing     Problem: Adult Inpatient Plan of Care  Goal: Readiness for Transition of Care  Outcome: Ongoing, Progressing     Pt is AAO x 4,  Able to verbalize needs. Denies any c/o of pain or discomfort at time. V/S stable. Pt repositions self independently in bed, ambulates to restroom as needed. Safety Precautions maintained at all times. Bed in low positions, wheels locked, side rails raised x 2. No falls or injuries. Personal belongings and call light within reach. Will cont. To monitor pt.
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
## 2020-09-13 NOTE — NURSING
Patient alert and oriented. C/o generalized pain. Received pm meds. VSS. No signs or symptoms of withdrawals. Report given to receiving nurse.

## 2020-09-14 VITALS
OXYGEN SATURATION: 98 % | HEIGHT: 71 IN | RESPIRATION RATE: 15 BRPM | SYSTOLIC BLOOD PRESSURE: 116 MMHG | BODY MASS INDEX: 24.03 KG/M2 | TEMPERATURE: 98 F | DIASTOLIC BLOOD PRESSURE: 80 MMHG | WEIGHT: 171.63 LBS | HEART RATE: 62 BPM

## 2020-09-14 LAB
ANION GAP SERPL CALC-SCNC: 7 MMOL/L (ref 8–16)
BASOPHILS # BLD AUTO: 0.07 K/UL (ref 0–0.2)
BASOPHILS NFR BLD: 0.8 % (ref 0–1.9)
BUN SERPL-MCNC: 15 MG/DL (ref 6–20)
CALCIUM SERPL-MCNC: 8.2 MG/DL (ref 8.7–10.5)
CHLORIDE SERPL-SCNC: 104 MMOL/L (ref 95–110)
CO2 SERPL-SCNC: 25 MMOL/L (ref 23–29)
CREAT SERPL-MCNC: 0.8 MG/DL (ref 0.5–1.4)
DIFFERENTIAL METHOD: ABNORMAL
EOSINOPHIL # BLD AUTO: 0.5 K/UL (ref 0–0.5)
EOSINOPHIL NFR BLD: 5.7 % (ref 0–8)
ERYTHROCYTE [DISTWIDTH] IN BLOOD BY AUTOMATED COUNT: 12.4 % (ref 11.5–14.5)
EST. GFR  (AFRICAN AMERICAN): >60 ML/MIN/1.73 M^2
EST. GFR  (NON AFRICAN AMERICAN): >60 ML/MIN/1.73 M^2
GLUCOSE SERPL-MCNC: 91 MG/DL (ref 70–110)
HCT VFR BLD AUTO: 39.2 % (ref 40–54)
HGB BLD-MCNC: 13.2 G/DL (ref 14–18)
IMM GRANULOCYTES # BLD AUTO: 0.16 K/UL (ref 0–0.04)
IMM GRANULOCYTES NFR BLD AUTO: 1.7 % (ref 0–0.5)
LYMPHOCYTES # BLD AUTO: 3.3 K/UL (ref 1–4.8)
LYMPHOCYTES NFR BLD: 35.9 % (ref 18–48)
MAGNESIUM SERPL-MCNC: 2.2 MG/DL (ref 1.6–2.6)
MCH RBC QN AUTO: 30.1 PG (ref 27–31)
MCHC RBC AUTO-ENTMCNC: 33.7 G/DL (ref 32–36)
MCV RBC AUTO: 89 FL (ref 82–98)
MONOCYTES # BLD AUTO: 0.5 K/UL (ref 0.3–1)
MONOCYTES NFR BLD: 5.4 % (ref 4–15)
NEUTROPHILS # BLD AUTO: 4.7 K/UL (ref 1.8–7.7)
NEUTROPHILS NFR BLD: 50.5 % (ref 38–73)
NRBC BLD-RTO: 0 /100 WBC
PHOSPHATE SERPL-MCNC: 3.4 MG/DL (ref 2.7–4.5)
PLATELET # BLD AUTO: 189 K/UL (ref 150–350)
PMV BLD AUTO: 10.9 FL (ref 9.2–12.9)
POTASSIUM SERPL-SCNC: 4.2 MMOL/L (ref 3.5–5.1)
RBC # BLD AUTO: 4.39 M/UL (ref 4.6–6.2)
SODIUM SERPL-SCNC: 136 MMOL/L (ref 136–145)
WBC # BLD AUTO: 9.27 K/UL (ref 3.9–12.7)

## 2020-09-14 PROCEDURE — 99239 HOSP IP/OBS DSCHRG MGMT >30: CPT | Mod: ,,, | Performed by: PHYSICIAN ASSISTANT

## 2020-09-14 PROCEDURE — 36415 COLL VENOUS BLD VENIPUNCTURE: CPT

## 2020-09-14 PROCEDURE — 85025 COMPLETE CBC W/AUTO DIFF WBC: CPT

## 2020-09-14 PROCEDURE — 84100 ASSAY OF PHOSPHORUS: CPT

## 2020-09-14 PROCEDURE — 25000003 PHARM REV CODE 250: Performed by: PHYSICIAN ASSISTANT

## 2020-09-14 PROCEDURE — 99239 PR HOSPITAL DISCHARGE DAY,>30 MIN: ICD-10-PCS | Mod: ,,, | Performed by: PHYSICIAN ASSISTANT

## 2020-09-14 PROCEDURE — 80048 BASIC METABOLIC PNL TOTAL CA: CPT

## 2020-09-14 PROCEDURE — 83735 ASSAY OF MAGNESIUM: CPT

## 2020-09-14 RX ORDER — LANOLIN ALCOHOL/MO/W.PET/CERES
100 CREAM (GRAM) TOPICAL DAILY
Qty: 30 TABLET | Refills: 0 | Status: SHIPPED | OUTPATIENT
Start: 2020-09-14 | End: 2020-10-14

## 2020-09-14 RX ORDER — DIAZEPAM 5 MG/1
5 TABLET ORAL 2 TIMES DAILY
Status: COMPLETED | OUTPATIENT
Start: 2020-09-14 | End: 2020-09-14

## 2020-09-14 RX ORDER — FOLIC ACID 1 MG/1
1 TABLET ORAL DAILY
Qty: 30 TABLET | Refills: 0 | Status: SHIPPED | OUTPATIENT
Start: 2020-09-14 | End: 2020-10-14

## 2020-09-14 RX ADMIN — OXYCODONE 5 MG: 5 TABLET ORAL at 10:09

## 2020-09-14 RX ADMIN — DIAZEPAM 5 MG: 5 TABLET ORAL at 09:09

## 2020-09-14 RX ADMIN — FOLIC ACID 1 MG: 1 TABLET ORAL at 09:09

## 2020-09-14 RX ADMIN — THERA TABS 1 TABLET: TAB at 09:09

## 2020-09-14 RX ADMIN — Medication 100 MG: at 09:09
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
## 2020-09-14 NOTE — PLAN OF CARE
CM received a call from patient's nurse, Amber, stating patient is getting into shower now and will be ready shortly to transport.  JEANETTE scheduled ambulatory transport for patient to go to [facility] for 1:30 PM.  JEANETTE then called [facility] and let  know that patient's transport scheduled for 1:30 PM.
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
## 2020-09-14 NOTE — PLAN OF CARE
Patient discharged to [facility] ETOH Rehab.       09/14/20 1459   Final Note   Assessment Type Final Discharge Note   Anticipated Discharge Disposition Rehab   Right Care Referral Info   Post Acute Recommendation Other   Referral Type ETOH Rehab   Facility Name [facility]
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
## 2020-09-14 NOTE — PLAN OF CARE
[phone] to verify patient placement post discharge.  No answer, CM left message.    9:45 AM  CM called [facility] back, spoke to Miguel who states they will take the patient in when he is discharged.  Address for facility is [address] [place] [address], [place] [address].  CM will call Miguel back when there is a transport time set up.

## 2021-04-11 NOTE — NURSING
Pt transferred from [facility] to [facility], Called and gave report to nurse Nguyễn.   VSS.   Pain controlled with prn pain med. Abd incision with intact steri-strips, binder in place. Pt independent wit hygiene. UAL in room. Tolerating diet. +BS, passing flatus/BM. Voiding adeq amount.  Discharge instructions and follow up appts reviewed with pt and spouse using teachback.

## 2022-03-07 NOTE — PROGRESS NOTES
05/06/18 2000   [facility]   Group Name [facility]   Specific Interventions Other (see comments)  (wrap up)   Participation Level Appropriate   Participation Quality Cooperative   Insight/Motivation Good   Affect/Mood Display Appropriate   Cognition Alert;Oriented      Patient has a lesion on the left lateral foot for the past 4 months.  He states that he thinks it is a callus.  Causing pain when he steps.

## 2024-08-01 NOTE — ED NOTES
Report given to NARESH Ramos   I have personally seen and examined the patient. I have collaborated with and supervised the